# Patient Record
Sex: MALE | Race: WHITE | NOT HISPANIC OR LATINO | Employment: UNEMPLOYED | URBAN - METROPOLITAN AREA
[De-identification: names, ages, dates, MRNs, and addresses within clinical notes are randomized per-mention and may not be internally consistent; named-entity substitution may affect disease eponyms.]

---

## 2017-02-13 ENCOUNTER — HISTORICAL (OUTPATIENT)
Dept: ADMINISTRATIVE | Facility: HOSPITAL | Age: 62
End: 2017-02-13

## 2018-06-04 ENCOUNTER — HISTORICAL (OUTPATIENT)
Dept: INTERNAL MEDICINE | Facility: CLINIC | Age: 63
End: 2018-06-04

## 2018-06-08 ENCOUNTER — HISTORICAL (OUTPATIENT)
Dept: INTERNAL MEDICINE | Facility: CLINIC | Age: 63
End: 2018-06-08

## 2018-12-11 ENCOUNTER — HISTORICAL (OUTPATIENT)
Dept: INTERNAL MEDICINE | Facility: CLINIC | Age: 63
End: 2018-12-11

## 2019-01-14 ENCOUNTER — HISTORICAL (OUTPATIENT)
Dept: INTERNAL MEDICINE | Facility: CLINIC | Age: 64
End: 2019-01-14

## 2019-05-13 ENCOUNTER — HISTORICAL (OUTPATIENT)
Dept: INTERNAL MEDICINE | Facility: CLINIC | Age: 64
End: 2019-05-13

## 2019-10-21 ENCOUNTER — HISTORICAL (OUTPATIENT)
Dept: INTERNAL MEDICINE | Facility: CLINIC | Age: 64
End: 2019-10-21

## 2020-05-04 ENCOUNTER — HISTORICAL (OUTPATIENT)
Dept: LAB | Facility: HOSPITAL | Age: 65
End: 2020-05-04

## 2020-06-12 ENCOUNTER — HISTORICAL (OUTPATIENT)
Dept: INTERNAL MEDICINE | Facility: CLINIC | Age: 65
End: 2020-06-12

## 2020-12-04 ENCOUNTER — HISTORICAL (OUTPATIENT)
Dept: INTERNAL MEDICINE | Facility: CLINIC | Age: 65
End: 2020-12-04

## 2021-06-07 ENCOUNTER — HISTORICAL (OUTPATIENT)
Dept: INTERNAL MEDICINE | Facility: CLINIC | Age: 66
End: 2021-06-07

## 2021-07-16 ENCOUNTER — HISTORICAL (OUTPATIENT)
Dept: ENDOSCOPY | Facility: HOSPITAL | Age: 66
End: 2021-07-16

## 2021-07-16 LAB — CRC RECOMMENDATION EXT: NORMAL

## 2021-12-08 ENCOUNTER — HISTORICAL (OUTPATIENT)
Dept: INTERNAL MEDICINE | Facility: CLINIC | Age: 66
End: 2021-12-08

## 2022-04-09 ENCOUNTER — HISTORICAL (OUTPATIENT)
Dept: ADMINISTRATIVE | Facility: HOSPITAL | Age: 67
End: 2022-04-09
Payer: MEDICARE

## 2022-04-25 VITALS
HEIGHT: 74 IN | WEIGHT: 258.19 LBS | DIASTOLIC BLOOD PRESSURE: 83 MMHG | SYSTOLIC BLOOD PRESSURE: 139 MMHG | BODY MASS INDEX: 33.13 KG/M2

## 2022-05-02 NOTE — HISTORICAL [facility]
This is a historical note converted from Judie. Formatting and pictures may have been removed.  Please reference Judie for original formatting and attached multimedia. History of Present Illness  Patient is a 64 yo male with history of HTN and HLD who presents for colonoscopy due to a positive FIT in 06/2020. He feels well this morning and completed his bowel prep. He denies nausea, vomiting, abdominal pain, constipation, diarrhea, or hematochezia. He had a colonoscopy at 52 yo that was normal per the patient.  Review of Systems  As per HPI  Physical Exam  Vitals & Measurements  T:?36.7? ?C (Oral)? HR:?71(Monitored)? RR:?18? BP:?140/91? SpO2:?100%? WT:?115.8?kg? BMI:?32.81?  NAD  Stable on room air  RRR  Abd soft, NT, ND  Moves all extremities  Assessment/Plan  Patient is a 64 yo male with history of HTN and HLD who presents for colonoscopy due to a positive FIT in 06/2020.  -Colonoscopy today  -Informed consent in chart  ?  Isa Gonzales MD  [employer] General Surgery, PGY-2  ?  ?   Agree w above. FIT (+). Colonoscopy.   Problem List/Past Medical History  Ongoing  HLD (hyperlipidemia)  HTN (hypertension)  Hypovitaminosis D  Obesity  Prediabetes  Historical  Colon cancer screening  HTN - Hypertension  Knee pain  Right carpal tunnel syndrome  Well adult exam  Procedure/Surgical History  Arthroscopy of knee joint (1974)  Hernia repair  Umbilical incision   Medications  Inpatient  buffered lidocaine 2% - 0.5 ml syringe, 10 mg= 0.5 mL, Subcutaneous, As Directed  IVF Lactated Ringers LR Infusion 1,000 mL, 1000 mL, IV  Home  aspirin 81 mg oral tablet, 81 mg= 1 tab(s), Oral, Daily,? ?Not taking  atorvastatin 10 mg oral tablet, 10 mg= 1 tab(s), Oral, Daily, 3 refills  Cialis 5 mg oral tablet, 5 mg= 1 tab(s), Oral, Daily, PRN, 1 refills  diclofenac sodium 75 mg oral delayed release tablet, 75 mg= 1 tab(s), Oral, BID  Flomax 0.4 mg oral capsule, 0.4 mg= 1 cap(s), Oral, Daily, 3 refills  hydrochlorothiazide 25 mg oral tablet, 25  mg= 1 tab(s), Oral, Daily, 3 refills  lisinopril 40 mg oral tablet, 40 mg= 1 tab(s), Oral, Daily  metoprolol succinate 50 mg oral tablet, extended release, 50 mg= 1 tab(s), Oral, Daily, 3 refills  Allergies  No Known Allergies  No Known Medication Allergies  Social History  Abuse/Neglect  No, 07/16/2021  Alcohol  Current, Beer, Liquor, 1-2 times per month, 06/10/2021  Employment/School  Employed, 08/19/2020  Exercise  Exercise frequency: 3-4 times/week. Self assessment: Good condition. Exercise type: Walking., 01/09/2019  Financial/Legal Situation  No insurance, 06/10/2021  Home/Environment  Lives with Spouse. Living situation: Home/Independent. Home equipment: BP Monitor. Alcohol abuse in household: No. Substance abuse in household: No. Smoker in household: Yes., 05/15/2017    Never in , 06/10/2021  Nutrition/Health  Type of diet: low sodium. Regular, Caffeine intake amount: 2 cups coffee/day., 01/09/2019  Other  Sexual  Sexually active: No. Gender Identity Identifies as male., 11/18/2019  Spiritual/Cultural  Mosque, 11/18/2019  Substance Use  Past, Marijuana, 09/04/2019  Tobacco  Never (less than 100 in lifetime), N/A, 07/16/2021  Family History  Congestive heart disease.: Father.  Dementia: Mother.  Hypertension.: Father.  Rheumatoid arthritis: Mother.  Immunizations  Vaccine Date Status Comments   COVID-19 mRNA, LNP-S, PF - Moderna 04/13/2021 Recorded    COVID-19 mRNA, LNP-S, PF - Moderna 03/16/2021 Recorded    tetanus/diphtheria/pertussis, acel(Tdap) 06/15/2019 Given    influenza virus vaccine, inactivated 03/25/2019 Recorded    influenza virus vaccine, inactivated 08/25/2018 Recorded    influenza virus vaccine, inactivated 10/28/2017 Recorded [facility]   tetanus/diphtheria/pertussis, acel(Tdap) 05/15/2017 Given

## 2022-05-31 ENCOUNTER — LAB VISIT (OUTPATIENT)
Dept: LAB | Facility: HOSPITAL | Age: 67
End: 2022-05-31
Attending: NURSE PRACTITIONER
Payer: MEDICARE

## 2022-05-31 DIAGNOSIS — E78.5 HYPERLIPIDEMIA, UNSPECIFIED HYPERLIPIDEMIA TYPE: ICD-10-CM

## 2022-05-31 DIAGNOSIS — Z00.00 WELLNESS EXAMINATION: Primary | ICD-10-CM

## 2022-05-31 DIAGNOSIS — R73.03 PREDIABETES: ICD-10-CM

## 2022-05-31 LAB
ALBUMIN SERPL-MCNC: 4.1 GM/DL (ref 3.4–4.8)
ALBUMIN/GLOB SERPL: 1.2 RATIO (ref 1.1–2)
ALP SERPL-CCNC: 105 UNIT/L (ref 40–150)
ALT SERPL-CCNC: 42 UNIT/L (ref 0–55)
APPEARANCE UR: CLEAR
AST SERPL-CCNC: 28 UNIT/L (ref 5–34)
BACTERIA #/AREA URNS AUTO: ABNORMAL /HPF
BASOPHILS # BLD AUTO: 0.05 X10(3)/MCL (ref 0–0.2)
BASOPHILS NFR BLD AUTO: 0.8 %
BILIRUB UR QL STRIP.AUTO: NEGATIVE MG/DL
BILIRUBIN DIRECT+TOT PNL SERPL-MCNC: 0.6 MG/DL
BUN SERPL-MCNC: 24.6 MG/DL (ref 8.4–25.7)
CALCIUM SERPL-MCNC: 9.6 MG/DL (ref 8.8–10)
CHLORIDE SERPL-SCNC: 105 MMOL/L (ref 98–107)
CHOLEST SERPL-MCNC: 202 MG/DL
CHOLEST/HDLC SERPL: 7 {RATIO} (ref 0–5)
CO2 SERPL-SCNC: 25 MMOL/L (ref 23–31)
COLOR UR AUTO: YELLOW
CREAT SERPL-MCNC: 0.83 MG/DL (ref 0.73–1.18)
EOSINOPHIL # BLD AUTO: 0.21 X10(3)/MCL (ref 0–0.9)
EOSINOPHIL NFR BLD AUTO: 3.2 %
ERYTHROCYTE [DISTWIDTH] IN BLOOD BY AUTOMATED COUNT: 13.2 % (ref 11.5–17)
EST. AVERAGE GLUCOSE BLD GHB EST-MCNC: 116.9 MG/DL
GLOBULIN SER-MCNC: 3.4 GM/DL (ref 2.4–3.5)
GLUCOSE SERPL-MCNC: 107 MG/DL (ref 82–115)
GLUCOSE UR QL STRIP.AUTO: NORMAL MG/DL
HBA1C MFR BLD: 5.7 %
HCT VFR BLD AUTO: 46.1 % (ref 42–52)
HDLC SERPL-MCNC: 30 MG/DL (ref 35–60)
HGB BLD-MCNC: 15.3 GM/DL (ref 14–18)
HYALINE CASTS #/AREA URNS LPF: ABNORMAL /LPF
IMM GRANULOCYTES # BLD AUTO: 0.02 X10(3)/MCL (ref 0–0.02)
IMM GRANULOCYTES NFR BLD AUTO: 0.3 % (ref 0–0.43)
KETONES UR QL STRIP.AUTO: NEGATIVE MG/DL
LDLC SERPL CALC-MCNC: 125 MG/DL (ref 50–140)
LEUKOCYTE ESTERASE UR QL STRIP.AUTO: NEGATIVE UNIT/L
LYMPHOCYTES # BLD AUTO: 2.11 X10(3)/MCL (ref 0.6–4.6)
LYMPHOCYTES NFR BLD AUTO: 32.5 %
MCH RBC QN AUTO: 30.6 PG (ref 27–31)
MCHC RBC AUTO-ENTMCNC: 33.2 MG/DL (ref 33–36)
MCV RBC AUTO: 92.2 FL (ref 80–94)
MONOCYTES # BLD AUTO: 0.62 X10(3)/MCL (ref 0.1–1.3)
MONOCYTES NFR BLD AUTO: 9.5 %
MUCOUS THREADS URNS QL MICRO: ABNORMAL /LPF
NEUTROPHILS # BLD AUTO: 3.5 X10(3)/MCL (ref 2.1–9.2)
NEUTROPHILS NFR BLD AUTO: 53.7 %
NITRITE UR QL STRIP.AUTO: NEGATIVE
NRBC BLD AUTO-RTO: 0 %
PH UR STRIP.AUTO: 6 [PH]
PLATELET # BLD AUTO: 240 X10(3)/MCL (ref 130–400)
PMV BLD AUTO: 11.7 FL (ref 9.4–12.4)
POTASSIUM SERPL-SCNC: 4.2 MMOL/L (ref 3.5–5.1)
PROT SERPL-MCNC: 7.5 GM/DL (ref 5.8–7.6)
PROT UR QL STRIP.AUTO: ABNORMAL MG/DL
PSA SERPL-MCNC: 2.96 NG/ML
RBC # BLD AUTO: 5 X10(6)/MCL (ref 4.7–6.1)
RBC #/AREA URNS AUTO: ABNORMAL /HPF
RBC UR QL AUTO: NEGATIVE UNIT/L
SODIUM SERPL-SCNC: 138 MMOL/L (ref 136–145)
SP GR UR STRIP.AUTO: 1.03
SQUAMOUS #/AREA URNS LPF: ABNORMAL /HPF
TRIGL SERPL-MCNC: 237 MG/DL (ref 34–140)
TSH SERPL-ACNC: 1.82 UIU/ML (ref 0.35–4.94)
UROBILINOGEN UR STRIP-ACNC: NORMAL MG/DL
VLDLC SERPL CALC-MCNC: 47 MG/DL
WBC # SPEC AUTO: 6.5 X10(3)/MCL (ref 4.5–11.5)
WBC #/AREA URNS AUTO: ABNORMAL /HPF

## 2022-05-31 PROCEDURE — 84153 ASSAY OF PSA TOTAL: CPT

## 2022-05-31 PROCEDURE — 36415 COLL VENOUS BLD VENIPUNCTURE: CPT

## 2022-05-31 PROCEDURE — 81001 URINALYSIS AUTO W/SCOPE: CPT

## 2022-05-31 PROCEDURE — 80061 LIPID PANEL: CPT

## 2022-05-31 PROCEDURE — 80053 COMPREHEN METABOLIC PANEL: CPT

## 2022-05-31 PROCEDURE — 85025 COMPLETE CBC W/AUTO DIFF WBC: CPT

## 2022-05-31 PROCEDURE — 84443 ASSAY THYROID STIM HORMONE: CPT

## 2022-05-31 PROCEDURE — 83036 HEMOGLOBIN GLYCOSYLATED A1C: CPT

## 2022-06-09 PROBLEM — E55.9 VITAMIN D DEFICIENCY: Status: ACTIVE | Noted: 2022-06-09

## 2022-06-09 PROBLEM — I10 HYPERTENSION: Status: ACTIVE | Noted: 2022-06-09

## 2022-06-09 PROBLEM — E66.9 OBESITY: Status: ACTIVE | Noted: 2022-06-09

## 2022-06-09 PROBLEM — M19.90 ARTHRITIS: Status: ACTIVE | Noted: 2022-06-09

## 2022-06-09 PROBLEM — E78.5 HYPERLIPIDEMIA: Status: ACTIVE | Noted: 2022-06-09
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
## 2022-06-10 ENCOUNTER — OFFICE VISIT (OUTPATIENT)
Dept: INTERNAL MEDICINE | Facility: CLINIC | Age: 67
End: 2022-06-10
Payer: COMMERCIAL

## 2022-06-10 VITALS
TEMPERATURE: 98 F | WEIGHT: 258 LBS | HEIGHT: 74 IN | DIASTOLIC BLOOD PRESSURE: 82 MMHG | BODY MASS INDEX: 33.11 KG/M2 | RESPIRATION RATE: 20 BRPM | HEART RATE: 75 BPM | SYSTOLIC BLOOD PRESSURE: 134 MMHG

## 2022-06-10 DIAGNOSIS — F51.01 PRIMARY INSOMNIA: ICD-10-CM

## 2022-06-10 DIAGNOSIS — Z00.00 WELLNESS EXAMINATION: Primary | ICD-10-CM

## 2022-06-10 DIAGNOSIS — I10 HYPERTENSION, UNSPECIFIED TYPE: ICD-10-CM

## 2022-06-10 DIAGNOSIS — N52.9 ERECTILE DYSFUNCTION, UNSPECIFIED ERECTILE DYSFUNCTION TYPE: ICD-10-CM

## 2022-06-10 DIAGNOSIS — E78.5 HYPERLIPIDEMIA, UNSPECIFIED HYPERLIPIDEMIA TYPE: ICD-10-CM

## 2022-06-10 DIAGNOSIS — R73.03 PREDIABETES: ICD-10-CM

## 2022-06-10 DIAGNOSIS — E66.9 OBESITY, UNSPECIFIED CLASSIFICATION, UNSPECIFIED OBESITY TYPE, UNSPECIFIED WHETHER SERIOUS COMORBIDITY PRESENT: ICD-10-CM

## 2022-06-10 DIAGNOSIS — M19.90 ARTHRITIS: ICD-10-CM

## 2022-06-10 PROCEDURE — 1159F MED LIST DOCD IN RCRD: CPT | Mod: CPTII,,, | Performed by: NURSE PRACTITIONER

## 2022-06-10 PROCEDURE — 4010F ACE/ARB THERAPY RXD/TAKEN: CPT | Mod: CPTII,,, | Performed by: NURSE PRACTITIONER

## 2022-06-10 PROCEDURE — 99214 OFFICE O/P EST MOD 30 MIN: CPT | Mod: PBBFAC | Performed by: NURSE PRACTITIONER

## 2022-06-10 PROCEDURE — 99397 PER PM REEVAL EST PAT 65+ YR: CPT | Mod: 25,S$PBB,, | Performed by: NURSE PRACTITIONER

## 2022-06-10 PROCEDURE — 1160F RVW MEDS BY RX/DR IN RCRD: CPT | Mod: CPTII,,, | Performed by: NURSE PRACTITIONER

## 2022-06-10 PROCEDURE — 3008F PR BODY MASS INDEX (BMI) DOCUMENTED: ICD-10-PCS | Mod: CPTII,,, | Performed by: NURSE PRACTITIONER

## 2022-06-10 PROCEDURE — 3079F PR MOST RECENT DIASTOLIC BLOOD PRESSURE 80-89 MM HG: ICD-10-PCS | Mod: CPTII,,, | Performed by: NURSE PRACTITIONER

## 2022-06-10 PROCEDURE — 3079F DIAST BP 80-89 MM HG: CPT | Mod: CPTII,,, | Performed by: NURSE PRACTITIONER

## 2022-06-10 PROCEDURE — 4010F PR ACE/ARB THEARPY RXD/TAKEN: ICD-10-PCS | Mod: CPTII,,, | Performed by: NURSE PRACTITIONER

## 2022-06-10 PROCEDURE — 1160F PR REVIEW ALL MEDS BY PRESCRIBER/CLIN PHARMACIST DOCUMENTED: ICD-10-PCS | Mod: CPTII,,, | Performed by: NURSE PRACTITIONER

## 2022-06-10 PROCEDURE — 3008F BODY MASS INDEX DOCD: CPT | Mod: CPTII,,, | Performed by: NURSE PRACTITIONER

## 2022-06-10 PROCEDURE — 3075F SYST BP GE 130 - 139MM HG: CPT | Mod: CPTII,,, | Performed by: NURSE PRACTITIONER

## 2022-06-10 PROCEDURE — 99397 PR PREVENTIVE VISIT,EST,65 & OVER: ICD-10-PCS | Mod: 25,S$PBB,, | Performed by: NURSE PRACTITIONER

## 2022-06-10 PROCEDURE — 3075F PR MOST RECENT SYSTOLIC BLOOD PRESS GE 130-139MM HG: ICD-10-PCS | Mod: CPTII,,, | Performed by: NURSE PRACTITIONER

## 2022-06-10 PROCEDURE — 1126F PR PAIN SEVERITY QUANTIFIED, NO PAIN PRESENT: ICD-10-PCS | Mod: CPTII,,, | Performed by: NURSE PRACTITIONER

## 2022-06-10 PROCEDURE — 1126F AMNT PAIN NOTED NONE PRSNT: CPT | Mod: CPTII,,, | Performed by: NURSE PRACTITIONER

## 2022-06-10 PROCEDURE — 1159F PR MEDICATION LIST DOCUMENTED IN MEDICAL RECORD: ICD-10-PCS | Mod: CPTII,,, | Performed by: NURSE PRACTITIONER

## 2022-06-10 RX ORDER — TADALAFIL 10 MG/1
10 TABLET ORAL DAILY PRN
Qty: 10 TABLET | Refills: 1 | Status: SHIPPED | OUTPATIENT
Start: 2022-06-10 | End: 2022-12-12 | Stop reason: SDUPTHER

## 2022-06-10 RX ORDER — METOPROLOL SUCCINATE 50 MG/1
50 TABLET, EXTENDED RELEASE ORAL DAILY
COMMUNITY
Start: 2022-01-20 | End: 2022-06-10 | Stop reason: SDUPTHER

## 2022-06-10 RX ORDER — HYDROCHLOROTHIAZIDE 25 MG/1
25 TABLET ORAL DAILY
COMMUNITY
Start: 2022-01-20 | End: 2022-06-10 | Stop reason: SDUPTHER

## 2022-06-10 RX ORDER — ATORVASTATIN CALCIUM 10 MG/1
10 TABLET, FILM COATED ORAL NIGHTLY
Qty: 90 TABLET | Refills: 1 | Status: SHIPPED | OUTPATIENT
Start: 2022-06-10 | End: 2022-12-08

## 2022-06-10 RX ORDER — METOPROLOL SUCCINATE 50 MG/1
50 TABLET, EXTENDED RELEASE ORAL DAILY
Qty: 90 TABLET | Refills: 1 | Status: SHIPPED | OUTPATIENT
Start: 2022-06-10 | End: 2022-12-08

## 2022-06-10 RX ORDER — LISINOPRIL 40 MG/1
40 TABLET ORAL DAILY
COMMUNITY
Start: 2022-06-04 | End: 2022-09-06

## 2022-06-10 RX ORDER — HYDROCHLOROTHIAZIDE 25 MG/1
25 TABLET ORAL DAILY
Qty: 90 TABLET | Refills: 1 | Status: SHIPPED | OUTPATIENT
Start: 2022-06-10 | End: 2022-12-08

## 2022-06-10 RX ORDER — ATORVASTATIN CALCIUM 10 MG/1
10 TABLET, FILM COATED ORAL DAILY
COMMUNITY
Start: 2022-01-20 | End: 2022-06-10 | Stop reason: SDUPTHER

## 2022-06-10 RX ORDER — TADALAFIL 5 MG/1
5 TABLET ORAL DAILY PRN
COMMUNITY
Start: 2022-01-13 | End: 2022-06-10

## 2022-06-10 RX ORDER — DICLOFENAC SODIUM 75 MG/1
75 TABLET, DELAYED RELEASE ORAL 2 TIMES DAILY PRN
Status: ON HOLD | COMMUNITY
Start: 2022-03-15 | End: 2023-08-02 | Stop reason: HOSPADM

## 2022-06-10 NOTE — ASSESSMENT & PLAN NOTE
Trig elevated on recent lipid panel but patient was not fasting  Continue current regimen  Repeat FLP at f/u--enc fasting labs. Will make adjustments if necessary pending results  Educated on a low-fat, low-cholesterol diet and aerobic exercise (20-30 mins/day x 5 days a week). Encouraged healthy fruits and veggie intake. Increase water intake. Avoid excess ETOH intake and smoking

## 2022-06-10 NOTE — ASSESSMENT & PLAN NOTE
At goal  Continue current medications  Educated on aerobic exercise (3-5 days/week) and a low-fat, low-sodium diet  Avoid excess ETOH consumption. Smoking cessation if applicable  ED precautions (s/s of CVA, etc)

## 2022-06-10 NOTE — ASSESSMENT & PLAN NOTE
Pt will try OTC Melatonin  Be consistent.  Make sure your bedroom is quiet, dark, relaxing, and at a comfortable temperature.  Remove electronic devices, such as TVs, computers, and smart phones, from the bedroom.  Avoid large meals, caffeine, and alcohol before bedtime.

## 2022-06-10 NOTE — ASSESSMENT & PLAN NOTE
HgA1c: 5.7%  Prediabetes: 5.7%-6.4%  Diabetes: 6.5% or greater  Recommendations:  Educated on a diabetic diet- Low-fat, Low-carb, Low-cholesterol. Instructed to avoid excessive intake of sugary/dark drinks/sodas and white foods (i.e., bread, pasta, potatoes, rice).  Encouraged aerobic exercise: 20-30 min/day x 5 days/week.

## 2022-06-10 NOTE — ASSESSMENT & PLAN NOTE
Heat/Ice therapy PRN  NSAIDs PRN (if not contraindicated)  Acetaminophen Arthritis Strength (if not contraindicated)  Aerobic Exercise  Weight Loss

## 2022-06-10 NOTE — ASSESSMENT & PLAN NOTE
Patient had been taking Cialis 5 mg po PRN. States believes he needs a stronger dose. He's taken without any adverse SE so far. States rare use. Will increase to 10 mg po daily PRN   The treatment for ED starts with taking care of your heart and vascular health:   Improve your eating habits (like eating more plant-based foods, and limiting high-fat or processed foods)    Maintain a healthy weight    Stop smoking, if applicable   Increase exercise    Limit drugs and alcohol    Sleep more (ideally 7-8 hours per night)      Oral Drugs   Oral drugs known as PDE type-5 inhibitors increase penile blood flow. These are drugs that are taken as a pill by mouth.  The only oral agents approved in the [place] by the Food and Drug Administration for ED are:    Viagra® (sildenafil citrate)    Levitra® (vardenafil HCl)    Cialis® (tadalafil)    Stendra® (avanafil)   For best results, men should take these pills about an hour or two before having sex. PDE-5 inhibitors improve blood flow to create a strong erection. To work, they require normal nerve function to the penis. Response rates are generally lower for people with diabetes or cancer  If you are taking nitrates for your heart, you SHOULD NOT take any PDE-5 inhibitors due to potentially dangerous hypotension.     Most often, the side effects of PDE-5 inhibitors are mild and usually last only a short while. The most common side effects include:    Facial flushing    Headache    Indigestion    Muscle aches    Stuffy nose   Most of the side effects linked to PDE-5 inhibitors are related to other tissues in the body because these drugs increase blood flow to your penis, so they can also impact other vascular tissues.

## 2022-09-12 PROBLEM — Z00.00 WELLNESS EXAMINATION: Status: RESOLVED | Noted: 2022-06-10 | Resolved: 2022-09-12

## 2022-12-05 ENCOUNTER — LAB VISIT (OUTPATIENT)
Dept: LAB | Facility: HOSPITAL | Age: 67
End: 2022-12-05
Attending: NURSE PRACTITIONER
Payer: COMMERCIAL

## 2022-12-05 DIAGNOSIS — E78.5 HYPERLIPIDEMIA, UNSPECIFIED HYPERLIPIDEMIA TYPE: ICD-10-CM

## 2022-12-05 DIAGNOSIS — R73.03 PREDIABETES: ICD-10-CM

## 2022-12-05 LAB
ALBUMIN SERPL-MCNC: 3.8 GM/DL (ref 3.4–4.8)
ALBUMIN/GLOB SERPL: 1.2 RATIO (ref 1.1–2)
ALP SERPL-CCNC: 95 UNIT/L (ref 40–150)
ALT SERPL-CCNC: 31 UNIT/L (ref 0–55)
AST SERPL-CCNC: 19 UNIT/L (ref 5–34)
BILIRUBIN DIRECT+TOT PNL SERPL-MCNC: 0.4 MG/DL
BUN SERPL-MCNC: 15.4 MG/DL (ref 8.4–25.7)
CALCIUM SERPL-MCNC: 9.7 MG/DL (ref 8.8–10)
CHLORIDE SERPL-SCNC: 103 MMOL/L (ref 98–107)
CHOLEST SERPL-MCNC: 154 MG/DL
CHOLEST/HDLC SERPL: 5 {RATIO} (ref 0–5)
CO2 SERPL-SCNC: 23 MMOL/L (ref 23–31)
CREAT SERPL-MCNC: 0.85 MG/DL (ref 0.73–1.18)
EST. AVERAGE GLUCOSE BLD GHB EST-MCNC: 116.9 MG/DL
GFR SERPLBLD CREATININE-BSD FMLA CKD-EPI: >60 MLS/MIN/1.73/M2
GLOBULIN SER-MCNC: 3.3 GM/DL (ref 2.4–3.5)
GLUCOSE SERPL-MCNC: 104 MG/DL (ref 82–115)
HBA1C MFR BLD: 5.7 %
HDLC SERPL-MCNC: 32 MG/DL (ref 35–60)
LDLC SERPL CALC-MCNC: 87 MG/DL (ref 50–140)
POTASSIUM SERPL-SCNC: 4.1 MMOL/L (ref 3.5–5.1)
PROT SERPL-MCNC: 7.1 GM/DL (ref 5.8–7.6)
SODIUM SERPL-SCNC: 137 MMOL/L (ref 136–145)
TRIGL SERPL-MCNC: 174 MG/DL (ref 34–140)
VLDLC SERPL CALC-MCNC: 35 MG/DL

## 2022-12-05 PROCEDURE — 80053 COMPREHEN METABOLIC PANEL: CPT

## 2022-12-05 PROCEDURE — 80061 LIPID PANEL: CPT

## 2022-12-05 PROCEDURE — 83036 HEMOGLOBIN GLYCOSYLATED A1C: CPT

## 2022-12-05 PROCEDURE — 36415 COLL VENOUS BLD VENIPUNCTURE: CPT

## 2022-12-10 ENCOUNTER — PATIENT OUTREACH (OUTPATIENT)
Dept: ADMINISTRATIVE | Facility: HOSPITAL | Age: 67
End: 2022-12-10
Payer: COMMERCIAL

## 2022-12-12 ENCOUNTER — OFFICE VISIT (OUTPATIENT)
Dept: INTERNAL MEDICINE | Facility: CLINIC | Age: 67
End: 2022-12-12
Payer: COMMERCIAL

## 2022-12-12 VITALS
TEMPERATURE: 98 F | RESPIRATION RATE: 20 BRPM | BODY MASS INDEX: 32.78 KG/M2 | HEART RATE: 71 BPM | HEIGHT: 74 IN | DIASTOLIC BLOOD PRESSURE: 68 MMHG | WEIGHT: 255.38 LBS | SYSTOLIC BLOOD PRESSURE: 122 MMHG

## 2022-12-12 DIAGNOSIS — E78.5 HYPERLIPIDEMIA, UNSPECIFIED HYPERLIPIDEMIA TYPE: ICD-10-CM

## 2022-12-12 DIAGNOSIS — Z23 NEED FOR PNEUMOCOCCAL VACCINATION: Primary | ICD-10-CM

## 2022-12-12 DIAGNOSIS — Z00.00 WELLNESS EXAMINATION: ICD-10-CM

## 2022-12-12 DIAGNOSIS — R73.03 PREDIABETES: ICD-10-CM

## 2022-12-12 DIAGNOSIS — N52.9 ERECTILE DYSFUNCTION, UNSPECIFIED ERECTILE DYSFUNCTION TYPE: ICD-10-CM

## 2022-12-12 DIAGNOSIS — Z12.5 SCREENING FOR PROSTATE CANCER: ICD-10-CM

## 2022-12-12 DIAGNOSIS — I10 HYPERTENSION, UNSPECIFIED TYPE: ICD-10-CM

## 2022-12-12 PROCEDURE — 99214 PR OFFICE/OUTPT VISIT, EST, LEVL IV, 30-39 MIN: ICD-10-PCS | Mod: S$PBB,,, | Performed by: NURSE PRACTITIONER

## 2022-12-12 PROCEDURE — 1101F PT FALLS ASSESS-DOCD LE1/YR: CPT | Mod: CPTII,,, | Performed by: NURSE PRACTITIONER

## 2022-12-12 PROCEDURE — 4010F ACE/ARB THERAPY RXD/TAKEN: CPT | Mod: CPTII,,, | Performed by: NURSE PRACTITIONER

## 2022-12-12 PROCEDURE — 1126F PR PAIN SEVERITY QUANTIFIED, NO PAIN PRESENT: ICD-10-PCS | Mod: CPTII,,, | Performed by: NURSE PRACTITIONER

## 2022-12-12 PROCEDURE — 3078F DIAST BP <80 MM HG: CPT | Mod: CPTII,,, | Performed by: NURSE PRACTITIONER

## 2022-12-12 PROCEDURE — 3074F PR MOST RECENT SYSTOLIC BLOOD PRESSURE < 130 MM HG: ICD-10-PCS | Mod: CPTII,,, | Performed by: NURSE PRACTITIONER

## 2022-12-12 PROCEDURE — 3078F PR MOST RECENT DIASTOLIC BLOOD PRESSURE < 80 MM HG: ICD-10-PCS | Mod: CPTII,,, | Performed by: NURSE PRACTITIONER

## 2022-12-12 PROCEDURE — 90677 PCV20 VACCINE IM: CPT | Mod: PBBFAC

## 2022-12-12 PROCEDURE — 1159F MED LIST DOCD IN RCRD: CPT | Mod: CPTII,,, | Performed by: NURSE PRACTITIONER

## 2022-12-12 PROCEDURE — 1160F PR REVIEW ALL MEDS BY PRESCRIBER/CLIN PHARMACIST DOCUMENTED: ICD-10-PCS | Mod: CPTII,,, | Performed by: NURSE PRACTITIONER

## 2022-12-12 PROCEDURE — 1160F RVW MEDS BY RX/DR IN RCRD: CPT | Mod: CPTII,,, | Performed by: NURSE PRACTITIONER

## 2022-12-12 PROCEDURE — 3008F PR BODY MASS INDEX (BMI) DOCUMENTED: ICD-10-PCS | Mod: CPTII,,, | Performed by: NURSE PRACTITIONER

## 2022-12-12 PROCEDURE — 3288F PR FALLS RISK ASSESSMENT DOCUMENTED: ICD-10-PCS | Mod: CPTII,,, | Performed by: NURSE PRACTITIONER

## 2022-12-12 PROCEDURE — 4010F PR ACE/ARB THEARPY RXD/TAKEN: ICD-10-PCS | Mod: CPTII,,, | Performed by: NURSE PRACTITIONER

## 2022-12-12 PROCEDURE — 1126F AMNT PAIN NOTED NONE PRSNT: CPT | Mod: CPTII,,, | Performed by: NURSE PRACTITIONER

## 2022-12-12 PROCEDURE — 3288F FALL RISK ASSESSMENT DOCD: CPT | Mod: CPTII,,, | Performed by: NURSE PRACTITIONER

## 2022-12-12 PROCEDURE — 99214 OFFICE O/P EST MOD 30 MIN: CPT | Mod: PBBFAC,25 | Performed by: NURSE PRACTITIONER

## 2022-12-12 PROCEDURE — 3008F BODY MASS INDEX DOCD: CPT | Mod: CPTII,,, | Performed by: NURSE PRACTITIONER

## 2022-12-12 PROCEDURE — 3074F SYST BP LT 130 MM HG: CPT | Mod: CPTII,,, | Performed by: NURSE PRACTITIONER

## 2022-12-12 PROCEDURE — 1101F PR PT FALLS ASSESS DOC 0-1 FALLS W/OUT INJ PAST YR: ICD-10-PCS | Mod: CPTII,,, | Performed by: NURSE PRACTITIONER

## 2022-12-12 PROCEDURE — 99214 OFFICE O/P EST MOD 30 MIN: CPT | Mod: S$PBB,,, | Performed by: NURSE PRACTITIONER

## 2022-12-12 PROCEDURE — 1159F PR MEDICATION LIST DOCUMENTED IN MEDICAL RECORD: ICD-10-PCS | Mod: CPTII,,, | Performed by: NURSE PRACTITIONER

## 2022-12-12 RX ORDER — HYDROCHLOROTHIAZIDE 25 MG/1
25 TABLET ORAL DAILY
Qty: 90 TABLET | Refills: 1 | Status: SHIPPED | OUTPATIENT
Start: 2022-12-12 | End: 2023-06-12 | Stop reason: SDUPTHER

## 2022-12-12 RX ORDER — ATORVASTATIN CALCIUM 10 MG/1
10 TABLET, FILM COATED ORAL NIGHTLY
Qty: 90 TABLET | Refills: 1 | Status: SHIPPED | OUTPATIENT
Start: 2022-12-12 | End: 2023-06-12 | Stop reason: SDUPTHER

## 2022-12-12 RX ORDER — LISINOPRIL 40 MG/1
40 TABLET ORAL DAILY
Qty: 90 TABLET | Refills: 1 | Status: SHIPPED | OUTPATIENT
Start: 2022-12-12 | End: 2023-06-12 | Stop reason: SDUPTHER

## 2022-12-12 RX ORDER — METOPROLOL SUCCINATE 50 MG/1
50 TABLET, EXTENDED RELEASE ORAL DAILY
Qty: 90 TABLET | Refills: 1 | Status: SHIPPED | OUTPATIENT
Start: 2022-12-12 | End: 2023-06-12 | Stop reason: SDUPTHER

## 2022-12-12 RX ORDER — TADALAFIL 10 MG/1
10 TABLET ORAL DAILY PRN
Qty: 10 TABLET | Refills: 1 | Status: SHIPPED | OUTPATIENT
Start: 2022-12-12 | End: 2023-11-13

## 2022-12-12 NOTE — ASSESSMENT & PLAN NOTE
FLP improved  Continue statin  Educated on a low-fat, low-cholesterol diet and aerobic exercise (20-30 mins/day x 5 days a week). Encouraged healthy fruits and veggie intake. Increase water intake. Avoid excess ETOH intake and smoking
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
## 2023-03-13 PROBLEM — Z00.00 WELLNESS EXAMINATION: Status: RESOLVED | Noted: 2022-06-10 | Resolved: 2023-03-13

## 2023-06-06 ENCOUNTER — LAB VISIT (OUTPATIENT)
Dept: LAB | Facility: HOSPITAL | Age: 68
End: 2023-06-06
Attending: NURSE PRACTITIONER
Payer: COMMERCIAL

## 2023-06-06 DIAGNOSIS — E78.5 HYPERLIPIDEMIA, UNSPECIFIED HYPERLIPIDEMIA TYPE: ICD-10-CM

## 2023-06-06 DIAGNOSIS — I10 HYPERTENSION, UNSPECIFIED TYPE: ICD-10-CM

## 2023-06-06 DIAGNOSIS — R73.03 PREDIABETES: ICD-10-CM

## 2023-06-06 DIAGNOSIS — Z12.5 SCREENING FOR PROSTATE CANCER: ICD-10-CM

## 2023-06-06 LAB
ALBUMIN SERPL-MCNC: 3.8 G/DL (ref 3.4–4.8)
ALBUMIN/GLOB SERPL: 1.3 RATIO (ref 1.1–2)
ALP SERPL-CCNC: 72 UNIT/L (ref 40–150)
ALT SERPL-CCNC: 33 UNIT/L (ref 0–55)
APPEARANCE UR: CLEAR
AST SERPL-CCNC: 24 UNIT/L (ref 5–34)
BACTERIA #/AREA URNS AUTO: ABNORMAL /HPF
BASOPHILS # BLD AUTO: 0.07 X10(3)/MCL
BASOPHILS NFR BLD AUTO: 0.9 %
BILIRUB UR QL STRIP.AUTO: NEGATIVE MG/DL
BILIRUBIN DIRECT+TOT PNL SERPL-MCNC: 0.5 MG/DL
BUN SERPL-MCNC: 19.8 MG/DL (ref 8.4–25.7)
CALCIUM SERPL-MCNC: 9.9 MG/DL (ref 8.8–10)
CHLORIDE SERPL-SCNC: 104 MMOL/L (ref 98–107)
CHOLEST SERPL-MCNC: 166 MG/DL
CHOLEST/HDLC SERPL: 5 {RATIO} (ref 0–5)
CO2 SERPL-SCNC: 28 MMOL/L (ref 23–31)
COLOR UR: ABNORMAL
CREAT SERPL-MCNC: 0.83 MG/DL (ref 0.73–1.18)
EOSINOPHIL # BLD AUTO: 0.23 X10(3)/MCL (ref 0–0.9)
EOSINOPHIL NFR BLD AUTO: 2.9 %
ERYTHROCYTE [DISTWIDTH] IN BLOOD BY AUTOMATED COUNT: 13.5 % (ref 11.5–17)
EST. AVERAGE GLUCOSE BLD GHB EST-MCNC: 119.8 MG/DL
GFR SERPLBLD CREATININE-BSD FMLA CKD-EPI: >60 MLS/MIN/1.73/M2
GLOBULIN SER-MCNC: 2.9 GM/DL (ref 2.4–3.5)
GLUCOSE SERPL-MCNC: 104 MG/DL (ref 82–115)
GLUCOSE UR QL STRIP.AUTO: NORMAL MG/DL
HBA1C MFR BLD: 5.8 %
HCT VFR BLD AUTO: 42.6 % (ref 42–52)
HDLC SERPL-MCNC: 31 MG/DL (ref 35–60)
HGB BLD-MCNC: 14.7 G/DL (ref 14–18)
HYALINE CASTS #/AREA URNS LPF: ABNORMAL /LPF
IMM GRANULOCYTES # BLD AUTO: 0.03 X10(3)/MCL (ref 0–0.04)
IMM GRANULOCYTES NFR BLD AUTO: 0.4 %
KETONES UR QL STRIP.AUTO: NEGATIVE MG/DL
LDLC SERPL CALC-MCNC: 108 MG/DL (ref 50–140)
LEUKOCYTE ESTERASE UR QL STRIP.AUTO: NEGATIVE UNIT/L
LYMPHOCYTES # BLD AUTO: 1.97 X10(3)/MCL (ref 0.6–4.6)
LYMPHOCYTES NFR BLD AUTO: 24.4 %
MCH RBC QN AUTO: 31.5 PG (ref 27–31)
MCHC RBC AUTO-ENTMCNC: 34.5 G/DL (ref 33–36)
MCV RBC AUTO: 91.4 FL (ref 80–94)
MONOCYTES # BLD AUTO: 0.65 X10(3)/MCL (ref 0.1–1.3)
MONOCYTES NFR BLD AUTO: 8.1 %
MUCOUS THREADS URNS QL MICRO: ABNORMAL /LPF
NEUTROPHILS # BLD AUTO: 5.11 X10(3)/MCL (ref 2.1–9.2)
NEUTROPHILS NFR BLD AUTO: 63.3 %
NITRITE UR QL STRIP.AUTO: NEGATIVE
NRBC BLD AUTO-RTO: 0 %
PH UR STRIP.AUTO: 6 [PH]
PLATELET # BLD AUTO: 198 X10(3)/MCL (ref 130–400)
PMV BLD AUTO: 11.9 FL (ref 7.4–10.4)
POTASSIUM SERPL-SCNC: 4.5 MMOL/L (ref 3.5–5.1)
PROT SERPL-MCNC: 6.7 GM/DL (ref 5.8–7.6)
PROT UR QL STRIP.AUTO: NEGATIVE MG/DL
PSA SERPL-MCNC: 2.77 NG/ML
RBC # BLD AUTO: 4.66 X10(6)/MCL (ref 4.7–6.1)
RBC #/AREA URNS AUTO: ABNORMAL /HPF
RBC UR QL AUTO: NEGATIVE UNIT/L
SODIUM SERPL-SCNC: 138 MMOL/L (ref 136–145)
SP GR UR STRIP.AUTO: 1.02
SQUAMOUS #/AREA URNS LPF: ABNORMAL /HPF
TRIGL SERPL-MCNC: 135 MG/DL (ref 34–140)
TSH SERPL-ACNC: 1.92 UIU/ML (ref 0.35–4.94)
UROBILINOGEN UR STRIP-ACNC: NORMAL MG/DL
VLDLC SERPL CALC-MCNC: 27 MG/DL
WBC # SPEC AUTO: 8.06 X10(3)/MCL (ref 4.5–11.5)
WBC #/AREA URNS AUTO: ABNORMAL /HPF

## 2023-06-06 PROCEDURE — 36415 COLL VENOUS BLD VENIPUNCTURE: CPT

## 2023-06-06 PROCEDURE — 83036 HEMOGLOBIN GLYCOSYLATED A1C: CPT

## 2023-06-06 PROCEDURE — 85025 COMPLETE CBC W/AUTO DIFF WBC: CPT

## 2023-06-06 PROCEDURE — 84443 ASSAY THYROID STIM HORMONE: CPT

## 2023-06-06 PROCEDURE — 81001 URINALYSIS AUTO W/SCOPE: CPT

## 2023-06-06 PROCEDURE — 84153 ASSAY OF PSA TOTAL: CPT

## 2023-06-06 PROCEDURE — 80061 LIPID PANEL: CPT

## 2023-06-06 PROCEDURE — 80053 COMPREHEN METABOLIC PANEL: CPT

## 2023-06-12 ENCOUNTER — OFFICE VISIT (OUTPATIENT)
Dept: INTERNAL MEDICINE | Facility: CLINIC | Age: 68
End: 2023-06-12
Payer: COMMERCIAL

## 2023-06-12 VITALS
DIASTOLIC BLOOD PRESSURE: 78 MMHG | WEIGHT: 269.19 LBS | RESPIRATION RATE: 20 BRPM | SYSTOLIC BLOOD PRESSURE: 142 MMHG | BODY MASS INDEX: 34.55 KG/M2 | HEART RATE: 73 BPM | HEIGHT: 74 IN | TEMPERATURE: 98 F

## 2023-06-12 DIAGNOSIS — F43.21 GRIEF: ICD-10-CM

## 2023-06-12 DIAGNOSIS — E78.5 HYPERLIPIDEMIA, UNSPECIFIED HYPERLIPIDEMIA TYPE: ICD-10-CM

## 2023-06-12 DIAGNOSIS — M25.532 PAIN IN BOTH WRISTS: ICD-10-CM

## 2023-06-12 DIAGNOSIS — R73.03 PREDIABETES: ICD-10-CM

## 2023-06-12 DIAGNOSIS — M54.9 DORSALGIA, UNSPECIFIED: Primary | ICD-10-CM

## 2023-06-12 DIAGNOSIS — M54.9 DORSALGIA: ICD-10-CM

## 2023-06-12 DIAGNOSIS — Z00.00 WELLNESS EXAMINATION: ICD-10-CM

## 2023-06-12 DIAGNOSIS — F51.01 PRIMARY INSOMNIA: ICD-10-CM

## 2023-06-12 DIAGNOSIS — M25.531 PAIN IN BOTH WRISTS: ICD-10-CM

## 2023-06-12 DIAGNOSIS — I10 HYPERTENSION, UNSPECIFIED TYPE: ICD-10-CM

## 2023-06-12 PROCEDURE — 3078F DIAST BP <80 MM HG: CPT | Mod: CPTII,,, | Performed by: NURSE PRACTITIONER

## 2023-06-12 PROCEDURE — 1160F RVW MEDS BY RX/DR IN RCRD: CPT | Mod: CPTII,,, | Performed by: NURSE PRACTITIONER

## 2023-06-12 PROCEDURE — 3288F PR FALLS RISK ASSESSMENT DOCUMENTED: ICD-10-PCS | Mod: CPTII,,, | Performed by: NURSE PRACTITIONER

## 2023-06-12 PROCEDURE — 99214 PR OFFICE/OUTPT VISIT, EST, LEVL IV, 30-39 MIN: ICD-10-PCS | Mod: S$PBB,,, | Performed by: NURSE PRACTITIONER

## 2023-06-12 PROCEDURE — 3288F FALL RISK ASSESSMENT DOCD: CPT | Mod: CPTII,,, | Performed by: NURSE PRACTITIONER

## 2023-06-12 PROCEDURE — 1100F PR PT FALLS ASSESS DOC 2+ FALLS/FALL W/INJURY/YR: ICD-10-PCS | Mod: CPTII,,, | Performed by: NURSE PRACTITIONER

## 2023-06-12 PROCEDURE — 3077F SYST BP >= 140 MM HG: CPT | Mod: CPTII,,, | Performed by: NURSE PRACTITIONER

## 2023-06-12 PROCEDURE — 1100F PTFALLS ASSESS-DOCD GE2>/YR: CPT | Mod: CPTII,,, | Performed by: NURSE PRACTITIONER

## 2023-06-12 PROCEDURE — 1125F PR PAIN SEVERITY QUANTIFIED, PAIN PRESENT: ICD-10-PCS | Mod: CPTII,,, | Performed by: NURSE PRACTITIONER

## 2023-06-12 PROCEDURE — 3077F PR MOST RECENT SYSTOLIC BLOOD PRESSURE >= 140 MM HG: ICD-10-PCS | Mod: CPTII,,, | Performed by: NURSE PRACTITIONER

## 2023-06-12 PROCEDURE — 1159F MED LIST DOCD IN RCRD: CPT | Mod: CPTII,,, | Performed by: NURSE PRACTITIONER

## 2023-06-12 PROCEDURE — 3078F PR MOST RECENT DIASTOLIC BLOOD PRESSURE < 80 MM HG: ICD-10-PCS | Mod: CPTII,,, | Performed by: NURSE PRACTITIONER

## 2023-06-12 PROCEDURE — 1159F PR MEDICATION LIST DOCUMENTED IN MEDICAL RECORD: ICD-10-PCS | Mod: CPTII,,, | Performed by: NURSE PRACTITIONER

## 2023-06-12 PROCEDURE — 99215 OFFICE O/P EST HI 40 MIN: CPT | Mod: PBBFAC | Performed by: NURSE PRACTITIONER

## 2023-06-12 PROCEDURE — 1160F PR REVIEW ALL MEDS BY PRESCRIBER/CLIN PHARMACIST DOCUMENTED: ICD-10-PCS | Mod: CPTII,,, | Performed by: NURSE PRACTITIONER

## 2023-06-12 PROCEDURE — 99214 OFFICE O/P EST MOD 30 MIN: CPT | Mod: S$PBB,,, | Performed by: NURSE PRACTITIONER

## 2023-06-12 PROCEDURE — 3008F BODY MASS INDEX DOCD: CPT | Mod: CPTII,,, | Performed by: NURSE PRACTITIONER

## 2023-06-12 PROCEDURE — 1125F AMNT PAIN NOTED PAIN PRSNT: CPT | Mod: CPTII,,, | Performed by: NURSE PRACTITIONER

## 2023-06-12 PROCEDURE — 4010F ACE/ARB THERAPY RXD/TAKEN: CPT | Mod: CPTII,,, | Performed by: NURSE PRACTITIONER

## 2023-06-12 PROCEDURE — 3008F PR BODY MASS INDEX (BMI) DOCUMENTED: ICD-10-PCS | Mod: CPTII,,, | Performed by: NURSE PRACTITIONER

## 2023-06-12 PROCEDURE — 4010F PR ACE/ARB THEARPY RXD/TAKEN: ICD-10-PCS | Mod: CPTII,,, | Performed by: NURSE PRACTITIONER

## 2023-06-12 RX ORDER — LISINOPRIL 40 MG/1
40 TABLET ORAL DAILY
Qty: 90 TABLET | Refills: 1 | Status: SHIPPED | OUTPATIENT
Start: 2023-06-12 | End: 2023-10-03

## 2023-06-12 RX ORDER — HYDROCHLOROTHIAZIDE 25 MG/1
25 TABLET ORAL DAILY
Qty: 90 TABLET | Refills: 1 | Status: SHIPPED | OUTPATIENT
Start: 2023-06-12 | End: 2023-10-03 | Stop reason: HOSPADM

## 2023-06-12 RX ORDER — METOPROLOL SUCCINATE 50 MG/1
50 TABLET, EXTENDED RELEASE ORAL DAILY
Qty: 90 TABLET | Refills: 1 | Status: SHIPPED | OUTPATIENT
Start: 2023-06-12 | End: 2023-11-03 | Stop reason: SDUPTHER

## 2023-06-12 RX ORDER — ATORVASTATIN CALCIUM 10 MG/1
10 TABLET, FILM COATED ORAL NIGHTLY
Qty: 90 TABLET | Refills: 1 | Status: SHIPPED | OUTPATIENT
Start: 2023-06-12 | End: 2023-11-03 | Stop reason: SDUPTHER

## 2023-06-12 RX ORDER — TRAZODONE HYDROCHLORIDE 50 MG/1
25-50 TABLET ORAL NIGHTLY
Qty: 30 TABLET | Refills: 1 | Status: SHIPPED | OUTPATIENT
Start: 2023-06-12 | End: 2023-07-14 | Stop reason: SDUPTHER

## 2023-06-12 NOTE — ASSESSMENT & PLAN NOTE
MRI L spine due to progressive nature and falls  Will plan referral to Neuro and/or Pain Mgmt pending results  ED precautions  Continue walker use for fall prevention

## 2023-06-12 NOTE — ASSESSMENT & PLAN NOTE
Continue Atorvastatin  Educated on a low-fat, low-cholesterol diet and aerobic exercise (20-30 mins/day x 5 days a week). Encouraged healthy fruits and veggie intake. Increase water intake. Avoid excess ETOH intake and smoking
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
## 2023-06-12 NOTE — ASSESSMENT & PLAN NOTE
Ramirez offered  Patient states is he ready to seek counseling, however, he is interested in seeing someone around [place]. He plans to ask around and states he will call the office with a name for a referral to be sent  He has no SI/HI

## 2023-06-12 NOTE — ASSESSMENT & PLAN NOTE
Trial Trazodone 25-50 mg po nightly PRN. No SI/HI. ED/911 precautions for such  Be consistent.  Make sure your bedroom is quiet, dark, relaxing, and at a comfortable temperature.  Remove electronic devices, such as TVs, computers, and smart phones, from the bedroom.  Avoid large meals, caffeine, and alcohol before bedtime.      F/u 4 weeks

## 2023-06-12 NOTE — ASSESSMENT & PLAN NOTE
Medications refilled  SBP slightly above goal; consider 2/2 back pain  He is asymptomatic  Educated on aerobic exercise (3-5 days/week) and a low-fat, low-sodium diet  Avoid excess ETOH consumption. Smoking cessation if applicable  ED precautions (s/s of CVA, etc)  RTC in 4 weeks to re-check BP

## 2023-06-12 NOTE — ASSESSMENT & PLAN NOTE
Consider CTS  Avoid repetitive motions/trauma/extreme wrist flexion or extension  Wrist splint (maintains wrist in a neutral position)  Refer to Ortho

## 2023-06-12 NOTE — ASSESSMENT & PLAN NOTE
HgA1c: 5.8%  Prediabetes: 5.7%-6.4%  Diabetes: 6.5% or greater  Recommendations:  Educated on a diabetic diet- Low-fat, Low-carb, Low-cholesterol. Instructed to avoid excessive intake of sugary/dark drinks/sodas and white foods (i.e., bread, pasta, potatoes, rice).  Encouraged aerobic exercise: 20-30 min/day x 5 days/week.

## 2023-06-22 ENCOUNTER — HOSPITAL ENCOUNTER (OUTPATIENT)
Dept: RADIOLOGY | Facility: HOSPITAL | Age: 68
Discharge: HOME | End: 2023-06-22
Attending: NURSE PRACTITIONER
Payer: COMMERCIAL

## 2023-06-22 DIAGNOSIS — M54.9 DORSALGIA, UNSPECIFIED: ICD-10-CM

## 2023-06-22 PROCEDURE — 72148 MRI LUMBAR SPINE W/O DYE: CPT | Mod: TC

## 2023-06-23 ENCOUNTER — TELEPHONE (OUTPATIENT)
Dept: INTERNAL MEDICINE | Facility: CLINIC | Age: 68
End: 2023-06-23
Payer: COMMERCIAL

## 2023-06-23 NOTE — TELEPHONE ENCOUNTER
"Patient completed an MRI of his lower spine due to pain and decreased mobility today. MRI with features concerning for "cauda equina impingement." This is an emergent finding and I suggest he visit [facility] or a hospital with neurosx services ASAP for an eval of this. This can compromise his mobility.  "

## 2023-06-24 ENCOUNTER — HOSPITAL ENCOUNTER (EMERGENCY)
Facility: HOSPITAL | Age: 68
Discharge: HOME | End: 2023-06-24
Attending: EMERGENCY MEDICINE
Payer: COMMERCIAL

## 2023-06-24 VITALS
SYSTOLIC BLOOD PRESSURE: 142 MMHG | HEART RATE: 86 BPM | DIASTOLIC BLOOD PRESSURE: 73 MMHG | TEMPERATURE: 99 F | RESPIRATION RATE: 18 BRPM | OXYGEN SATURATION: 96 %

## 2023-06-24 DIAGNOSIS — M54.41 ACUTE MIDLINE LOW BACK PAIN WITH BILATERAL SCIATICA: ICD-10-CM

## 2023-06-24 DIAGNOSIS — M48.061 SPINAL STENOSIS OF LUMBAR REGION WITHOUT NEUROGENIC CLAUDICATION: Primary | ICD-10-CM

## 2023-06-24 DIAGNOSIS — M54.42 ACUTE MIDLINE LOW BACK PAIN WITH BILATERAL SCIATICA: ICD-10-CM

## 2023-06-24 LAB
ALBUMIN SERPL-MCNC: 3.9 G/DL (ref 3.4–4.8)
ALBUMIN/GLOB SERPL: 1.3 RATIO (ref 1.1–2)
ALP SERPL-CCNC: 90 UNIT/L (ref 40–150)
ALT SERPL-CCNC: 37 UNIT/L (ref 0–55)
AST SERPL-CCNC: 25 UNIT/L (ref 5–34)
BASOPHILS # BLD AUTO: 0.06 X10(3)/MCL
BASOPHILS NFR BLD AUTO: 0.8 %
BILIRUBIN DIRECT+TOT PNL SERPL-MCNC: 0.5 MG/DL
BUN SERPL-MCNC: 13.9 MG/DL (ref 8.4–25.7)
CALCIUM SERPL-MCNC: 9.4 MG/DL (ref 8.8–10)
CHLORIDE SERPL-SCNC: 103 MMOL/L (ref 98–107)
CO2 SERPL-SCNC: 22 MMOL/L (ref 23–31)
CREAT SERPL-MCNC: 0.88 MG/DL (ref 0.73–1.18)
EOSINOPHIL # BLD AUTO: 0.09 X10(3)/MCL (ref 0–0.9)
EOSINOPHIL NFR BLD AUTO: 1.2 %
ERYTHROCYTE [DISTWIDTH] IN BLOOD BY AUTOMATED COUNT: 13.1 % (ref 11.5–17)
GFR SERPLBLD CREATININE-BSD FMLA CKD-EPI: >60 MLS/MIN/1.73/M2
GLOBULIN SER-MCNC: 3.1 GM/DL (ref 2.4–3.5)
GLUCOSE SERPL-MCNC: 116 MG/DL (ref 82–115)
HCT VFR BLD AUTO: 45.6 % (ref 42–52)
HGB BLD-MCNC: 15.6 G/DL (ref 14–18)
IMM GRANULOCYTES # BLD AUTO: 0.03 X10(3)/MCL (ref 0–0.04)
IMM GRANULOCYTES NFR BLD AUTO: 0.4 %
LYMPHOCYTES # BLD AUTO: 1.36 X10(3)/MCL (ref 0.6–4.6)
LYMPHOCYTES NFR BLD AUTO: 18.4 %
MCH RBC QN AUTO: 31.3 PG (ref 27–31)
MCHC RBC AUTO-ENTMCNC: 34.2 G/DL (ref 33–36)
MCV RBC AUTO: 91.6 FL (ref 80–94)
MONOCYTES # BLD AUTO: 0.55 X10(3)/MCL (ref 0.1–1.3)
MONOCYTES NFR BLD AUTO: 7.4 %
NEUTROPHILS # BLD AUTO: 5.31 X10(3)/MCL (ref 2.1–9.2)
NEUTROPHILS NFR BLD AUTO: 71.8 %
NRBC BLD AUTO-RTO: 0 %
PLATELET # BLD AUTO: 221 X10(3)/MCL (ref 130–400)
PMV BLD AUTO: 11.7 FL (ref 7.4–10.4)
POTASSIUM SERPL-SCNC: 4.2 MMOL/L (ref 3.5–5.1)
PROT SERPL-MCNC: 7 GM/DL (ref 5.8–7.6)
RBC # BLD AUTO: 4.98 X10(6)/MCL (ref 4.7–6.1)
SODIUM SERPL-SCNC: 134 MMOL/L (ref 136–145)
WBC # SPEC AUTO: 7.4 X10(3)/MCL (ref 4.5–11.5)

## 2023-06-24 PROCEDURE — 80053 COMPREHEN METABOLIC PANEL: CPT | Performed by: NURSE PRACTITIONER

## 2023-06-24 PROCEDURE — 99284 EMERGENCY DEPT VISIT MOD MDM: CPT | Mod: 25

## 2023-06-24 PROCEDURE — 63600175 PHARM REV CODE 636 W HCPCS: Performed by: EMERGENCY MEDICINE

## 2023-06-24 PROCEDURE — 85025 COMPLETE CBC W/AUTO DIFF WBC: CPT | Performed by: NURSE PRACTITIONER

## 2023-06-24 PROCEDURE — 96374 THER/PROPH/DIAG INJ IV PUSH: CPT

## 2023-06-24 PROCEDURE — 96375 TX/PRO/DX INJ NEW DRUG ADDON: CPT

## 2023-06-24 RX ORDER — HYDROCODONE BITARTRATE AND ACETAMINOPHEN 10; 325 MG/1; MG/1
1 TABLET ORAL EVERY 6 HOURS PRN
Qty: 15 TABLET | Refills: 0 | Status: SHIPPED | OUTPATIENT
Start: 2023-06-24 | End: 2023-07-12

## 2023-06-24 RX ORDER — GABAPENTIN 300 MG/1
300 CAPSULE ORAL 3 TIMES DAILY
Qty: 90 CAPSULE | Refills: 0 | Status: SHIPPED | OUTPATIENT
Start: 2023-06-24 | End: 2023-07-12 | Stop reason: SDUPTHER

## 2023-06-24 RX ORDER — ONDANSETRON 2 MG/ML
4 INJECTION INTRAMUSCULAR; INTRAVENOUS
Status: COMPLETED | OUTPATIENT
Start: 2023-06-24 | End: 2023-06-24

## 2023-06-24 RX ORDER — MORPHINE SULFATE 4 MG/ML
4 INJECTION, SOLUTION INTRAMUSCULAR; INTRAVENOUS
Status: COMPLETED | OUTPATIENT
Start: 2023-06-24 | End: 2023-06-24

## 2023-06-24 RX ADMIN — MORPHINE SULFATE 4 MG: 4 INJECTION INTRAVENOUS at 12:06

## 2023-06-24 RX ADMIN — ONDANSETRON 4 MG: 2 INJECTION INTRAMUSCULAR; INTRAVENOUS at 12:06

## 2023-06-24 NOTE — FIRST PROVIDER EVALUATION
Medical screening examination initiated.  I have conducted a focused provider triage encounter, findings are as follows:    Brief history of present illness:  66y/o M presents to the ED with abnormal MRI findings suggesting causa equina impingement. Contacted by PCP on today.   There were no vitals filed for this visit.    Pertinent physical exam:  AAA x 3    Brief workup plan:  MD evaluation. /labs    Preliminary workup initiated; this workup will be continued and followed by the physician or advanced practice provider that is assigned to the patient when roomed.

## 2023-06-24 NOTE — ED PROVIDER NOTES
"Encounter Date: 6/24/2023    SCRIBE #1 NOTE: I, Mukesh Martinez, am scribing for, and in the presence of,  Dr. Cary. I have scribed the following portions of the note - Other sections scribed: HPI, ROS, Physical Exam, MDM, Attending.     History     Chief Complaint   Patient presents with    Back Pain     Pt reports lower back pain onset 1 month. Denies trauma/injury. Also reports numbness /tingling in bilateral hands onset 1 month. Denies urinary/bowel incontinence. Had MRI 2 days ago and was told to come to ED for eval.       66 y/o male with history of HTN presents to ED for worsening lower back pain onset about a month ago.  Pt says he woke up one day and the pain was present.  He also complains of numbness/tingling to bilateral arms and legs, especially in his fingers and toes.  Pt says he went to a chiropractor after his pain began, which made his pain worse.  He had XR at urgent care recently, and he was told "he had nothing between his spines."  Pt had MRI 2 days ago at [facility] and was told to come here today for further eval.  Pt has been using walker to ambulate.  He is not on thinners.  He denies bowel or bladder incontinence.      The history is provided by the patient.   Back Pain   This is a new problem. Illness onset: about a month ago. The problem occurs constantly. The problem has been gradually worsening. The pain is associated with no known injury. Associated symptoms include numbness (bilateral arms and legs), paresthesias and tingling. Pertinent negatives include no bowel incontinence and no bladder incontinence.   Review of patient's allergies indicates:  No Known Allergies  No past medical history on file.  Past Surgical History:   Procedure Laterality Date    ARTHROSCOPY OF KNEE  1974    COLONOSCOPY  07/16/2021    UMBILICAL HERNIA REPAIR       Family History   Problem Relation Age of Onset    Dementia Mother     Rheum arthritis Mother     Heart disease Father     Hypertension Father      Social " History     Tobacco Use    Smoking status: Never    Smokeless tobacco: Never   Substance Use Topics    Alcohol use: Not Currently    Drug use: Yes     Types: Marijuana     Comment: Gummies     Review of Systems   Gastrointestinal:  Negative for bowel incontinence.   Genitourinary:  Negative for bladder incontinence.   Musculoskeletal:  Positive for back pain.   Neurological:  Positive for tingling, numbness (bilateral arms and legs) and paresthesias.        Denies bowel/bladder incontinence      Physical Exam     Initial Vitals [06/24/23 1159]   BP Pulse Resp Temp SpO2   (!) 156/78 86 18 98.5 °F (36.9 °C) 98 %      MAP       --         Physical Exam    Nursing note and vitals reviewed.  Constitutional: He appears well-developed and well-nourished. He is not diaphoretic. No distress.   HENT:   Head: Normocephalic and atraumatic.   Right Ear: External ear normal.   Left Ear: External ear normal.   Eyes: Conjunctivae are normal.   Neck: Neck supple.   Normal range of motion.  Cardiovascular:  Normal rate.           Pulmonary/Chest: No respiratory distress.   Abdominal: He exhibits no distension.   Musculoskeletal:         General: Normal range of motion.      Cervical back: Normal range of motion and neck supple.      Comments: No midline spinal tenderness      Neurological: He is alert and oriented to person, place, and time. GCS score is 15. GCS eye subscore is 4. GCS verbal subscore is 5. GCS motor subscore is 6.   Normal motor strength to bilateral LE's; diminished sensation from hips down bilaterally; 2+ DTRs to bilateral patella and achilles   Skin: Skin is warm and dry. No pallor.   Psychiatric: He has a normal mood and affect.       ED Course   Procedures  Labs Reviewed   COMPREHENSIVE METABOLIC PANEL - Abnormal; Notable for the following components:       Result Value    Sodium Level 134 (*)     Carbon Dioxide 22 (*)     Glucose Level 116 (*)     All other components within normal limits   CBC WITH  DIFFERENTIAL - Abnormal; Notable for the following components:    MCH 31.3 (*)     MPV 11.7 (*)     All other components within normal limits   CBC W/ AUTO DIFFERENTIAL    Narrative:     The following orders were created for panel order CBC Auto Differential.  Procedure                               Abnormality         Status                     ---------                               -----------         ------                     CBC with Differential[[id number]]        Abnormal            Final result                 Please view results for these tests on the individual orders.          Imaging Results    None          Medications   morphine injection 4 mg (4 mg Intravenous Given 6/24/23 1244)   ondansetron injection 4 mg (4 mg Intravenous Given 6/24/23 1244)              Scribe Attestation:   Scribe #1: I performed the above scribed service and the documentation accurately describes the services I performed. I attest to the accuracy of the note.    Attending Attestation:           Physician Attestation for Scribe:  Physician Attestation Statement for Scribe #1: I, reviewed documentation, as scribed by Mukesh Martinez in my presence, and it is both accurate and complete.         Medical Decision Making  66 yo male with low back pain with BLE numbness, no incontinence. Normal motor strength. Recent MRI of lumbar spine.       Differential diagnoses include, but are not limited to: spinal stenosis, radiculopathy, vertebral fracture, cauda equina syndrome, strain       ED management:  Morphine for pain  Discussed with NSGY, recs in ED course and patient agreeable     Problems Addressed:  Acute midline low back pain with bilateral sciatica: acute illness or injury that poses a threat to life or bodily functions  Spinal stenosis of lumbar region without neurogenic claudication: acute illness or injury that poses a threat to life or bodily functions    Amount and/or Complexity of Data Reviewed  External Data Reviewed:  radiology.     Details: FINDINGS:  No fracture or destructive lesion evident.  The distal spinal cord and conus are grossly normal.     Vertebral body heights maintained.     T12-L1 demonstrates a left paracentral desiccated disc versus osteophytic protrusion extending 3-4 mm beyond the disc plane, no significant spinal canal or foraminal narrowing.     L1-L2 demonstrates severe disc space narrowing, severe broad-based disc bulge, ligament flavum thickening, moderate spinal canal narrowing with reduction of the cross-sectional diameter to 6 mm, effacement of the CSF, mild foraminal narrowing with extraforaminal nerve root contact bilaterally.     L2-L3 demonstrates complete obliteration of the disc space, moderate osteophytic change along the endplate margin, borderline spinal canal narrowing.     L3-L4 moderate disc space narrowing, moderate severe disc bulge, mild spinal canal narrowing with reduction of the AP diameter to 5-6 mm, partial effacement of the CSF, mild foraminal narrowing on the right.  Bilateral lateral recess narrowing.     L4-5 demonstrates severe disc space narrowing, severe marginal osteophytic change and desiccated disc bulge, prominent posterior epidural fat, severe spinal canal narrowing with reduction of the AP diameter to 2-3 mm, near complete effacement of the CSF, severe foraminal narrowing with nerve root distortion and suspected impingement bilaterally.     L5-S1 demonstrates moderate disc space narrowing, vacuum disc, 3-4 mm anterolisthesis, severe disc bulge, bilateral pars defects, moderate foraminal narrowing right greater than left with nerve root distortion on the right.     Intra-abdominal organs and paraspinal soft tissues normal.     Impression:     L4-5 demonstrates severe osteophytic change, disc bulging along the posterior endplate margin, prominent posterior epidural fat resulting in severe spinal canal narrowing with reduction of the AP diameter to 2 or 3 mm, near complete  effacement of CSF.  There is proximal nerve root buckling at the mid to upper lumbar spine suggesting cauda equina impingement.     L1-L2 demonstrates severe disc space narrowing, severe disc bulge, ligament flavum thickening, moderate severe spinal canal narrowing with near complete effacement of CSF.     Multilevel spondylosis as above.  Labs: ordered. Decision-making details documented in ED Course.    Risk  Prescription drug management.  Parenteral controlled substances.            ED Course as of 06/24/23 1509   Sat Jun 24, 2023   1240 Consult: NSGY- Spoke with Dr Flannery who will review images  [KM]   1306 Discussed with patient and neurosurgery, patient is agreeable with following up outpatient in neurosurgery clinic this week to discuss outpatient surgical options.  Patient is currently ambulating well with a walker and has no bowel or bladder incontinence.  We will discharge with gabapentin as well as narcotic and I gave him strict ED return precautions [KM]      ED Course User Index  [KM] Abeba Cary MD                 Clinical Impression:   Final diagnoses:  [M48.061] Spinal stenosis of lumbar region without neurogenic claudication (Primary)  [M54.42, M54.41] Acute midline low back pain with bilateral sciatica        ED Disposition Condition    Discharge Stable          ED Prescriptions       Medication Sig Dispense Start Date End Date Auth. Provider    HYDROcodone-acetaminophen (NORCO)  mg per tablet Take 1 tablet by mouth every 6 (six) hours as needed for Pain. 15 tablet 6/24/2023 6/29/2023 Abeba Cary MD    gabapentin (NEURONTIN) 300 MG capsule Take 1 capsule (300 mg total) by mouth 3 (three) times daily. 90 capsule 6/24/2023 7/24/2023 Abeba Cary MD          Follow-up Information       Follow up With Specialties Details Why Contact Info    [facility] - Emergency Dept Emergency Medicine  As needed, If symptoms worsen [address] [facility]  [id number]  [phone]    Kael Flannery MD Neurosurgery  someone from his office should be calling with appt this week in [place] with neurosurgeon [address] [facility] [address] [facility]  [address]  [facility] [address] [phone]               Abeba Cary MD  06/24/23 3038

## 2023-06-24 NOTE — TELEPHONE ENCOUNTER
"I was able to reach pt by phone at 0933 this am. He relates "I knew it was bad because I've been having a hard time." I advised ED precautions ASAP. He relates that he will contact his daughter to try and bring him to [facility].     Will follow-up  "

## 2023-07-04 DIAGNOSIS — F51.01 PRIMARY INSOMNIA: ICD-10-CM

## 2023-07-12 ENCOUNTER — TELEPHONE (OUTPATIENT)
Dept: NEUROSURGERY | Facility: CLINIC | Age: 68
End: 2023-07-12
Payer: COMMERCIAL

## 2023-07-12 ENCOUNTER — OFFICE VISIT (OUTPATIENT)
Dept: INTERNAL MEDICINE | Facility: CLINIC | Age: 68
End: 2023-07-12
Payer: COMMERCIAL

## 2023-07-12 VITALS
HEART RATE: 79 BPM | DIASTOLIC BLOOD PRESSURE: 70 MMHG | BODY MASS INDEX: 33.65 KG/M2 | TEMPERATURE: 98 F | WEIGHT: 262.19 LBS | SYSTOLIC BLOOD PRESSURE: 136 MMHG | HEIGHT: 74 IN

## 2023-07-12 DIAGNOSIS — M54.9 DORSALGIA, UNSPECIFIED: Primary | ICD-10-CM

## 2023-07-12 DIAGNOSIS — G83.4 CAUDA EQUINA SYNDROME: ICD-10-CM

## 2023-07-12 DIAGNOSIS — M54.9 BACK PAIN, UNSPECIFIED BACK LOCATION, UNSPECIFIED BACK PAIN LATERALITY, UNSPECIFIED CHRONICITY: Primary | ICD-10-CM

## 2023-07-12 DIAGNOSIS — R73.03 PREDIABETES: ICD-10-CM

## 2023-07-12 PROCEDURE — 1159F PR MEDICATION LIST DOCUMENTED IN MEDICAL RECORD: ICD-10-PCS | Mod: CPTII,,, | Performed by: NURSE PRACTITIONER

## 2023-07-12 PROCEDURE — 3078F DIAST BP <80 MM HG: CPT | Mod: CPTII,,, | Performed by: NURSE PRACTITIONER

## 2023-07-12 PROCEDURE — 3008F PR BODY MASS INDEX (BMI) DOCUMENTED: ICD-10-PCS | Mod: CPTII,,, | Performed by: NURSE PRACTITIONER

## 2023-07-12 PROCEDURE — 1100F PTFALLS ASSESS-DOCD GE2>/YR: CPT | Mod: CPTII,,, | Performed by: NURSE PRACTITIONER

## 2023-07-12 PROCEDURE — 3075F PR MOST RECENT SYSTOLIC BLOOD PRESS GE 130-139MM HG: ICD-10-PCS | Mod: CPTII,,, | Performed by: NURSE PRACTITIONER

## 2023-07-12 PROCEDURE — 3078F PR MOST RECENT DIASTOLIC BLOOD PRESSURE < 80 MM HG: ICD-10-PCS | Mod: CPTII,,, | Performed by: NURSE PRACTITIONER

## 2023-07-12 PROCEDURE — 99215 OFFICE O/P EST HI 40 MIN: CPT | Mod: S$PBB,,, | Performed by: NURSE PRACTITIONER

## 2023-07-12 PROCEDURE — 3044F PR MOST RECENT HEMOGLOBIN A1C LEVEL <7.0%: ICD-10-PCS | Mod: CPTII,,, | Performed by: NURSE PRACTITIONER

## 2023-07-12 PROCEDURE — 3008F BODY MASS INDEX DOCD: CPT | Mod: CPTII,,, | Performed by: NURSE PRACTITIONER

## 2023-07-12 PROCEDURE — 1160F PR REVIEW ALL MEDS BY PRESCRIBER/CLIN PHARMACIST DOCUMENTED: ICD-10-PCS | Mod: CPTII,,, | Performed by: NURSE PRACTITIONER

## 2023-07-12 PROCEDURE — 1100F PR PT FALLS ASSESS DOC 2+ FALLS/FALL W/INJURY/YR: ICD-10-PCS | Mod: CPTII,,, | Performed by: NURSE PRACTITIONER

## 2023-07-12 PROCEDURE — 3288F PR FALLS RISK ASSESSMENT DOCUMENTED: ICD-10-PCS | Mod: CPTII,,, | Performed by: NURSE PRACTITIONER

## 2023-07-12 PROCEDURE — 4010F ACE/ARB THERAPY RXD/TAKEN: CPT | Mod: CPTII,,, | Performed by: NURSE PRACTITIONER

## 2023-07-12 PROCEDURE — 99215 PR OFFICE/OUTPT VISIT, EST, LEVL V, 40-54 MIN: ICD-10-PCS | Mod: S$PBB,,, | Performed by: NURSE PRACTITIONER

## 2023-07-12 PROCEDURE — 1159F MED LIST DOCD IN RCRD: CPT | Mod: CPTII,,, | Performed by: NURSE PRACTITIONER

## 2023-07-12 PROCEDURE — 3075F SYST BP GE 130 - 139MM HG: CPT | Mod: CPTII,,, | Performed by: NURSE PRACTITIONER

## 2023-07-12 PROCEDURE — 3288F FALL RISK ASSESSMENT DOCD: CPT | Mod: CPTII,,, | Performed by: NURSE PRACTITIONER

## 2023-07-12 PROCEDURE — 3044F HG A1C LEVEL LT 7.0%: CPT | Mod: CPTII,,, | Performed by: NURSE PRACTITIONER

## 2023-07-12 PROCEDURE — 99214 OFFICE O/P EST MOD 30 MIN: CPT | Mod: PBBFAC | Performed by: NURSE PRACTITIONER

## 2023-07-12 PROCEDURE — 1160F RVW MEDS BY RX/DR IN RCRD: CPT | Mod: CPTII,,, | Performed by: NURSE PRACTITIONER

## 2023-07-12 PROCEDURE — 4010F PR ACE/ARB THEARPY RXD/TAKEN: ICD-10-PCS | Mod: CPTII,,, | Performed by: NURSE PRACTITIONER

## 2023-07-12 RX ORDER — GABAPENTIN 300 MG/1
300 CAPSULE ORAL 3 TIMES DAILY
Qty: 90 CAPSULE | Refills: 0 | Status: SHIPPED | OUTPATIENT
Start: 2023-07-12 | End: 2023-11-30

## 2023-07-12 RX ORDER — ACETAMINOPHEN AND CODEINE PHOSPHATE 300; 30 MG/1; MG/1
1 TABLET ORAL EVERY 8 HOURS PRN
Qty: 9 TABLET | Refills: 0 | Status: SHIPPED | OUTPATIENT
Start: 2023-07-12 | End: 2023-07-15
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
## 2023-07-12 NOTE — TELEPHONE ENCOUNTER
Returned call to Jerome, the pt's NP, she stated that they placed a referral in the system for the pt on 6/24. I stated to Jerome that we did receive the referral and 's next available is not until September. Jerome requested to see someone else due to the pt's condition getting worse. I stated that we have another surgeon who has more availability. Scheduled pt to see  on Tuesday, July 18 at 220 pm and Jerome stated that she will call the pt;s daughter to inform her

## 2023-07-12 NOTE — ASSESSMENT & PLAN NOTE
I placed a call to call center for Dr. Flannery' office. Awaiting call back regarding referral/need for appt. Will relay info to patient once received  Will c/s CM for assistance with DM  ER precautions  Fall-proof home    *received call from Dr. Flannery' office after visit was completed. They were able to schedule patient with Dr. Clark 7/18/23 @ 220 with arrival at 130P. This info was directly communicated to pt's dtrNara, per pt/dtr's request.

## 2023-07-13 ENCOUNTER — TELEPHONE (OUTPATIENT)
Dept: ADMINISTRATIVE | Facility: HOSPITAL | Age: 68
End: 2023-07-13
Payer: COMMERCIAL

## 2023-07-14 ENCOUNTER — PATIENT OUTREACH (OUTPATIENT)
Dept: ADMINISTRATIVE | Facility: OTHER | Age: 68
End: 2023-07-14
Payer: COMMERCIAL

## 2023-07-14 RX ORDER — TRAZODONE HYDROCHLORIDE 50 MG/1
25-50 TABLET ORAL NIGHTLY
Qty: 30 TABLET | Refills: 1 | Status: SHIPPED | OUTPATIENT
Start: 2023-07-14 | End: 2023-07-28

## 2023-07-17 NOTE — PROGRESS NOTES
CHW - Initial Contact    This Community Health Worker completed the Social Determinant of Health questionnaire with patient via telephone today.    Pt identified barriers of most importance are: no real needs, ramp getting put in, daughter helps with the groceries, finding someone to help mow the lawn.   Referrals to community agencies completed with patient/caregiver consent outside of [facility] include: [place] [facility]  Referrals were put through [facility] - N/A  Support and Services: still pretty sad from losing spouse this year, but has a ton of support from so many people   Other information discussed the patient needs / wants help with: we agreed to a follow up in a month     Follow-up Outreach - Due: 8/21/2023

## 2023-07-18 ENCOUNTER — OFFICE VISIT (OUTPATIENT)
Dept: NEUROSURGERY | Facility: CLINIC | Age: 68
End: 2023-07-18
Payer: COMMERCIAL

## 2023-07-18 ENCOUNTER — HOSPITAL ENCOUNTER (OUTPATIENT)
Dept: RADIOLOGY | Facility: HOSPITAL | Age: 68
Discharge: HOME | End: 2023-07-18
Attending: NEUROLOGICAL SURGERY
Payer: COMMERCIAL

## 2023-07-18 VITALS — BODY MASS INDEX: 33.62 KG/M2 | WEIGHT: 262 LBS | HEIGHT: 74 IN

## 2023-07-18 DIAGNOSIS — G95.9 CERVICAL MYELOPATHY: ICD-10-CM

## 2023-07-18 DIAGNOSIS — M48.02 SPINAL STENOSIS, CERVICAL REGION: ICD-10-CM

## 2023-07-18 DIAGNOSIS — M54.9 BACK PAIN, UNSPECIFIED BACK LOCATION, UNSPECIFIED BACK PAIN LATERALITY, UNSPECIFIED CHRONICITY: ICD-10-CM

## 2023-07-18 DIAGNOSIS — G95.9 CERVICAL MYELOPATHY: Primary | ICD-10-CM

## 2023-07-18 DIAGNOSIS — M48.061 SPINAL STENOSIS OF LUMBAR REGION WITHOUT NEUROGENIC CLAUDICATION: ICD-10-CM

## 2023-07-18 PROCEDURE — 4010F ACE/ARB THERAPY RXD/TAKEN: CPT | Mod: CPTII,S$GLB,, | Performed by: NEUROLOGICAL SURGERY

## 2023-07-18 PROCEDURE — 72050 XR CERVICAL SPINE AP LAT WITH FLEX EXTEN: ICD-10-PCS | Mod: 26,,, | Performed by: RADIOLOGY

## 2023-07-18 PROCEDURE — 3008F PR BODY MASS INDEX (BMI) DOCUMENTED: ICD-10-PCS | Mod: CPTII,S$GLB,, | Performed by: NEUROLOGICAL SURGERY

## 2023-07-18 PROCEDURE — 72050 X-RAY EXAM NECK SPINE 4/5VWS: CPT | Mod: 26,,, | Performed by: RADIOLOGY

## 2023-07-18 PROCEDURE — 4010F PR ACE/ARB THEARPY RXD/TAKEN: ICD-10-PCS | Mod: CPTII,S$GLB,, | Performed by: NEUROLOGICAL SURGERY

## 2023-07-18 PROCEDURE — 99205 OFFICE O/P NEW HI 60 MIN: CPT | Mod: S$GLB,,, | Performed by: NEUROLOGICAL SURGERY

## 2023-07-18 PROCEDURE — 3044F PR MOST RECENT HEMOGLOBIN A1C LEVEL <7.0%: ICD-10-PCS | Mod: CPTII,S$GLB,, | Performed by: NEUROLOGICAL SURGERY

## 2023-07-18 PROCEDURE — 99205 PR OFFICE/OUTPT VISIT, NEW, LEVL V, 60-74 MIN: ICD-10-PCS | Mod: S$GLB,,, | Performed by: NEUROLOGICAL SURGERY

## 2023-07-18 PROCEDURE — 72110 X-RAY EXAM L-2 SPINE 4/>VWS: CPT | Mod: 26,,, | Performed by: RADIOLOGY

## 2023-07-18 PROCEDURE — 3008F BODY MASS INDEX DOCD: CPT | Mod: CPTII,S$GLB,, | Performed by: NEUROLOGICAL SURGERY

## 2023-07-18 PROCEDURE — 3288F FALL RISK ASSESSMENT DOCD: CPT | Mod: CPTII,S$GLB,, | Performed by: NEUROLOGICAL SURGERY

## 2023-07-18 PROCEDURE — 1100F PR PT FALLS ASSESS DOC 2+ FALLS/FALL W/INJURY/YR: ICD-10-PCS | Mod: CPTII,S$GLB,, | Performed by: NEUROLOGICAL SURGERY

## 2023-07-18 PROCEDURE — 1100F PTFALLS ASSESS-DOCD GE2>/YR: CPT | Mod: CPTII,S$GLB,, | Performed by: NEUROLOGICAL SURGERY

## 2023-07-18 PROCEDURE — 1125F AMNT PAIN NOTED PAIN PRSNT: CPT | Mod: CPTII,S$GLB,, | Performed by: NEUROLOGICAL SURGERY

## 2023-07-18 PROCEDURE — 3288F PR FALLS RISK ASSESSMENT DOCUMENTED: ICD-10-PCS | Mod: CPTII,S$GLB,, | Performed by: NEUROLOGICAL SURGERY

## 2023-07-18 PROCEDURE — 72110 XR LUMBAR SPINE AP AND LAT WITH FLEX/EXT: ICD-10-PCS | Mod: 26,,, | Performed by: RADIOLOGY

## 2023-07-18 PROCEDURE — 1125F PR PAIN SEVERITY QUANTIFIED, PAIN PRESENT: ICD-10-PCS | Mod: CPTII,S$GLB,, | Performed by: NEUROLOGICAL SURGERY

## 2023-07-18 PROCEDURE — 3044F HG A1C LEVEL LT 7.0%: CPT | Mod: CPTII,S$GLB,, | Performed by: NEUROLOGICAL SURGERY

## 2023-07-18 PROCEDURE — 1159F PR MEDICATION LIST DOCUMENTED IN MEDICAL RECORD: ICD-10-PCS | Mod: CPTII,S$GLB,, | Performed by: NEUROLOGICAL SURGERY

## 2023-07-18 PROCEDURE — 72110 X-RAY EXAM L-2 SPINE 4/>VWS: CPT | Mod: TC,FY

## 2023-07-18 PROCEDURE — 72050 X-RAY EXAM NECK SPINE 4/5VWS: CPT | Mod: TC,FY

## 2023-07-18 PROCEDURE — 1159F MED LIST DOCD IN RCRD: CPT | Mod: CPTII,S$GLB,, | Performed by: NEUROLOGICAL SURGERY

## 2023-07-18 NOTE — H&P (VIEW-ONLY)
NEUROSURGICAL OUTPATIENT CONSULTATION NOTE    DATE OF SERVICE:  07/18/2023    ATTENDING PHYSICIAN:  Steven Clark MD    CONSULT REQUESTED BY:  Abeba Cary     REASON FOR CONSULT:  Weakness in arms and legs x 2 months    HISTORY OF PRESENT ILLNESS:  This is a very pleasant 67 y.o. male who noted   Sudden weakness in his arms and legs starting 2 months ago.  There was no inciting event.  Feels numbness in the hands and weakness in the hands.  He has difficulty walking and is now requiring a wheelchair.  He presented to an outside chiropractor and then was seen in an urgent care center with x-rays the led to an MRI of his lumbar spine.  He did not bring his x-rays but I do have copy of the lumbar MRI.  No bowel or bladder issues at this time.    PAST MEDICAL HISTORY:  Active Ambulatory Problems     Diagnosis Date Noted    Hypertension 06/09/2022    Hyperlipidemia 06/09/2022    Vitamin D deficiency 06/09/2022    Obesity 06/09/2022    Arthritis 06/09/2022    Wellness examination 06/10/2022    Prediabetes 06/10/2022    Primary insomnia 06/10/2022    Erectile dysfunction 06/10/2022    Pain in both wrists 06/12/2023    Dorsalgia 06/12/2023    Grief 06/12/2023    Cauda equina syndrome 07/12/2023     Resolved Ambulatory Problems     Diagnosis Date Noted    No Resolved Ambulatory Problems     No Additional Past Medical History       PAST SURGICAL HISTORY:  Past Surgical History:   Procedure Laterality Date    ARTHROSCOPY OF KNEE  1974    COLONOSCOPY  07/16/2021    UMBILICAL HERNIA REPAIR         SOCIAL HISTORY:   Social History     Socioeconomic History    Marital status:      Spouse name: Barbara    Number of children: 2   Tobacco Use    Smoking status: Never    Smokeless tobacco: Never   Substance and Sexual Activity    Alcohol use: Not Currently    Drug use: Not Currently     Types: Marijuana     Comment: Gummies    Sexual activity: Not Currently     Partners: Female     Social Determinants of Health     Financial  Resource Strain: Low Risk     Difficulty of Paying Living Expenses: Not very hard   Food Insecurity: No Food Insecurity    Worried About Running Out of Food in the Last Year: Never true    Ran Out of Food in the Last Year: Never true   Transportation Needs: No Transportation Needs    Lack of Transportation (Medical): No    Lack of Transportation (Non-Medical): No   Physical Activity: Insufficiently Active    Days of Exercise per Week: 2 days    Minutes of Exercise per Session: 30 min   Stress: Stress Concern Present    Feeling of Stress : Very much   Social Connections: Moderately Isolated    Frequency of Communication with Friends and Family: More than three times a week    Frequency of Social Gatherings with Friends and Family: More than three times a week    Attends Alev[employer] Services: More than 4 times per year    Active Member of Clubs or Organizations: No    Attends Club or Organization Meetings: Never    Marital Status:    Housing Stability: Low Risk     Unable to Pay for Housing in the Last Year: No    Number of Places Lived in the Last Year: 1    Unstable Housing in the Last Year: No       FAMILY HISTORY:  Family History   Problem Relation Age of Onset    Dementia Mother     Rheum arthritis Mother     Heart disease Father     Hypertension Father        CURRENTS MEDICATIONS:  Current Outpatient Medications on File Prior to Visit   Medication Sig Dispense Refill    atorvastatin (LIPITOR) 10 MG tablet Take 1 tablet (10 mg total) by mouth every evening. 90 tablet 1    diclofenac (VOLTAREN) 75 MG EC tablet Take 75 mg by mouth 2 (two) times daily as needed for Pain.      gabapentin (NEURONTIN) 300 MG capsule Take 1 capsule (300 mg total) by mouth 3 (three) times daily. 90 capsule 0    hydroCHLOROthiazide (HYDRODIURIL) 25 MG tablet Take 1 tablet (25 mg total) by mouth once daily. 90 tablet 1    lisinopriL (PRINIVIL,ZESTRIL) 40 MG tablet Take 1 tablet (40 mg total) by mouth once daily. 90 tablet 1     metoprolol succinate (TOPROL-XL) 50 MG 24 hr tablet Take 1 tablet (50 mg total) by mouth once daily. 90 tablet 1    tadalafiL (CIALIS) 10 MG tablet Take 1 tablet (10 mg total) by mouth daily as needed for Erectile Dysfunction. 10 tablet 1    traZODone (DESYREL) 50 MG tablet TAKE 0.5-1 TABLETS (25-50 MG TOTAL) BY MOUTH EVERY EVENING. 30 tablet 1     No current facility-administered medications on file prior to visit.       ALLERGIES:  Review of patient's allergies indicates:  No Known Allergies    REVIEW OF SYSTEMS:  ROS - per HPI    OBJECTIVE:    PHYSICAL EXAMINATION:   There were no vitals filed for this visit.      Bilateral  strength is 4-  Positive Felice sign bilateral  Lower extremities with diffuse hyperreflexia   Two beats clonus at the bilateral ankles    DIAGNOSTIC DATA:  I personally interpreted the following imaging:      MRI lumbar spine shows central stenosis moderate to severe from L1 down to the sacrum.  He appears to have L5 pars defects and listhesis at L5-S1.    ASSESMENT:  This is a 67 y.o. male with     Problem List Items Addressed This Visit    None  Visit Diagnoses       Cervical myelopathy    -  Primary    Relevant Orders    MRI Cervical Spine Without Contrast    X-Ray Lumbar Spine Ap Lateral w/Flex Ext    X-Ray Cervical Spine AP Lat with Flexion  Extension    Spinal stenosis of lumbar region without neurogenic claudication        Relevant Orders    MRI Cervical Spine Without Contrast    X-Ray Lumbar Spine Ap Lateral w/Flex Ext    X-Ray Cervical Spine AP Lat with Flexion  Extension    Spinal stenosis, cervical region        Relevant Orders    MRI Cervical Spine Without Contrast            PLAN:  Diagnosis:   He has severe lumbar stenosis with likely pars defects at L5.  Clinically, he presents more as a cervical myelopathy.  I suspect cervical stenosis    Plan:  urgent MRI cervical spine in addition to x-ray imaging of lumbar and cervical    Follow-up:  once cervical MRI is  complete    Steven Clark MD, FAANS    Disclaimer: This note was partly generated using dictation software which may occasionally result in transcription errors.

## 2023-07-18 NOTE — PROGRESS NOTES
NEUROSURGICAL OUTPATIENT CONSULTATION NOTE    DATE OF SERVICE:  07/18/2023    ATTENDING PHYSICIAN:  Steven Clark MD    CONSULT REQUESTED BY:  Abeba Cary     REASON FOR CONSULT:  Weakness in arms and legs x 2 months    HISTORY OF PRESENT ILLNESS:  This is a very pleasant 67 y.o. male who noted   Sudden weakness in his arms and legs starting 2 months ago.  There was no inciting event.  Feels numbness in the hands and weakness in the hands.  He has difficulty walking and is now requiring a wheelchair.  He presented to an outside chiropractor and then was seen in an urgent care center with x-rays the led to an MRI of his lumbar spine.  He did not bring his x-rays but I do have copy of the lumbar MRI.  No bowel or bladder issues at this time.    PAST MEDICAL HISTORY:  Active Ambulatory Problems     Diagnosis Date Noted    Hypertension 06/09/2022    Hyperlipidemia 06/09/2022    Vitamin D deficiency 06/09/2022    Obesity 06/09/2022    Arthritis 06/09/2022    Wellness examination 06/10/2022    Prediabetes 06/10/2022    Primary insomnia 06/10/2022    Erectile dysfunction 06/10/2022    Pain in both wrists 06/12/2023    Dorsalgia 06/12/2023    Grief 06/12/2023    Cauda equina syndrome 07/12/2023     Resolved Ambulatory Problems     Diagnosis Date Noted    No Resolved Ambulatory Problems     No Additional Past Medical History       PAST SURGICAL HISTORY:  Past Surgical History:   Procedure Laterality Date    ARTHROSCOPY OF KNEE  1974    COLONOSCOPY  07/16/2021    UMBILICAL HERNIA REPAIR         SOCIAL HISTORY:   Social History     Socioeconomic History    Marital status:      Spouse name: Barbara    Number of children: 2   Tobacco Use    Smoking status: Never    Smokeless tobacco: Never   Substance and Sexual Activity    Alcohol use: Not Currently    Drug use: Not Currently     Types: Marijuana     Comment: Gummies    Sexual activity: Not Currently     Partners: Female     Social Determinants of Health     Financial  Resource Strain: Low Risk     Difficulty of Paying Living Expenses: Not very hard   Food Insecurity: No Food Insecurity    Worried About Running Out of Food in the Last Year: Never true    Ran Out of Food in the Last Year: Never true   Transportation Needs: No Transportation Needs    Lack of Transportation (Medical): No    Lack of Transportation (Non-Medical): No   Physical Activity: Insufficiently Active    Days of Exercise per Week: 2 days    Minutes of Exercise per Session: 30 min   Stress: Stress Concern Present    Feeling of Stress : Very much   Social Connections: Moderately Isolated    Frequency of Communication with Friends and Family: More than three times a week    Frequency of Social Gatherings with Friends and Family: More than three times a week    Attends [employer] Services: More than 4 times per year    Active Member of Clubs or Organizations: No    Attends Club or Organization Meetings: Never    Marital Status:    Housing Stability: Low Risk     Unable to Pay for Housing in the Last Year: No    Number of Places Lived in the Last Year: 1    Unstable Housing in the Last Year: No       FAMILY HISTORY:  Family History   Problem Relation Age of Onset    Dementia Mother     Rheum arthritis Mother     Heart disease Father     Hypertension Father        CURRENTS MEDICATIONS:  Current Outpatient Medications on File Prior to Visit   Medication Sig Dispense Refill    atorvastatin (LIPITOR) 10 MG tablet Take 1 tablet (10 mg total) by mouth every evening. 90 tablet 1    diclofenac (VOLTAREN) 75 MG EC tablet Take 75 mg by mouth 2 (two) times daily as needed for Pain.      gabapentin (NEURONTIN) 300 MG capsule Take 1 capsule (300 mg total) by mouth 3 (three) times daily. 90 capsule 0    hydroCHLOROthiazide (HYDRODIURIL) 25 MG tablet Take 1 tablet (25 mg total) by mouth once daily. 90 tablet 1    lisinopriL (PRINIVIL,ZESTRIL) 40 MG tablet Take 1 tablet (40 mg total) by mouth once daily. 90 tablet 1     metoprolol succinate (TOPROL-XL) 50 MG 24 hr tablet Take 1 tablet (50 mg total) by mouth once daily. 90 tablet 1    tadalafiL (CIALIS) 10 MG tablet Take 1 tablet (10 mg total) by mouth daily as needed for Erectile Dysfunction. 10 tablet 1    traZODone (DESYREL) 50 MG tablet TAKE 0.5-1 TABLETS (25-50 MG TOTAL) BY MOUTH EVERY EVENING. 30 tablet 1     No current facility-administered medications on file prior to visit.       ALLERGIES:  Review of patient's allergies indicates:  No Known Allergies    REVIEW OF SYSTEMS:  ROS - per HPI    OBJECTIVE:    PHYSICAL EXAMINATION:   There were no vitals filed for this visit.      Bilateral  strength is 4-  Positive Felice sign bilateral  Lower extremities with diffuse hyperreflexia   Two beats clonus at the bilateral ankles    DIAGNOSTIC DATA:  I personally interpreted the following imaging:      MRI lumbar spine shows central stenosis moderate to severe from L1 down to the sacrum.  He appears to have L5 pars defects and listhesis at L5-S1.    ASSESMENT:  This is a 67 y.o. male with     Problem List Items Addressed This Visit    None  Visit Diagnoses       Cervical myelopathy    -  Primary    Relevant Orders    MRI Cervical Spine Without Contrast    X-Ray Lumbar Spine Ap Lateral w/Flex Ext    X-Ray Cervical Spine AP Lat with Flexion  Extension    Spinal stenosis of lumbar region without neurogenic claudication        Relevant Orders    MRI Cervical Spine Without Contrast    X-Ray Lumbar Spine Ap Lateral w/Flex Ext    X-Ray Cervical Spine AP Lat with Flexion  Extension    Spinal stenosis, cervical region        Relevant Orders    MRI Cervical Spine Without Contrast            PLAN:  Diagnosis:   He has severe lumbar stenosis with likely pars defects at L5.  Clinically, he presents more as a cervical myelopathy.  I suspect cervical stenosis    Plan:  urgent MRI cervical spine in addition to x-ray imaging of lumbar and cervical    Follow-up:  once cervical MRI is  complete    Steven Clark MD, FAANS    Disclaimer: This note was partly generated using dictation software which may occasionally result in transcription errors.

## 2023-07-26 ENCOUNTER — HOSPITAL ENCOUNTER (OUTPATIENT)
Dept: RADIOLOGY | Facility: HOSPITAL | Age: 68
Discharge: HOME | End: 2023-07-26
Attending: NEUROLOGICAL SURGERY
Payer: COMMERCIAL

## 2023-07-26 DIAGNOSIS — G95.9 CERVICAL MYELOPATHY: ICD-10-CM

## 2023-07-26 DIAGNOSIS — M48.02 SPINAL STENOSIS, CERVICAL REGION: ICD-10-CM

## 2023-07-26 DIAGNOSIS — M48.061 SPINAL STENOSIS OF LUMBAR REGION WITHOUT NEUROGENIC CLAUDICATION: ICD-10-CM

## 2023-07-26 PROCEDURE — 72141 MRI NECK SPINE W/O DYE: CPT | Mod: TC

## 2023-07-28 ENCOUNTER — OFFICE VISIT (OUTPATIENT)
Dept: INTERNAL MEDICINE | Facility: CLINIC | Age: 68
End: 2023-07-28
Payer: COMMERCIAL

## 2023-07-28 ENCOUNTER — ANESTHESIA EVENT (OUTPATIENT)
Dept: SURGERY | Facility: HOSPITAL | Age: 68
DRG: 472 | End: 2023-07-28
Payer: COMMERCIAL

## 2023-07-28 VITALS
HEART RATE: 78 BPM | TEMPERATURE: 98 F | DIASTOLIC BLOOD PRESSURE: 78 MMHG | WEIGHT: 264 LBS | OXYGEN SATURATION: 99 % | HEIGHT: 74 IN | RESPIRATION RATE: 20 BRPM | SYSTOLIC BLOOD PRESSURE: 156 MMHG | BODY MASS INDEX: 33.88 KG/M2

## 2023-07-28 DIAGNOSIS — Z01.818 PREOPERATIVE CLEARANCE: Primary | ICD-10-CM

## 2023-07-28 DIAGNOSIS — E66.9 CLASS 1 OBESITY WITH BODY MASS INDEX (BMI) OF 33.0 TO 33.9 IN ADULT, UNSPECIFIED OBESITY TYPE, UNSPECIFIED WHETHER SERIOUS COMORBIDITY PRESENT: ICD-10-CM

## 2023-07-28 DIAGNOSIS — G95.20 SPINAL CORD COMPRESSION: ICD-10-CM

## 2023-07-28 DIAGNOSIS — E78.5 HYPERLIPIDEMIA, UNSPECIFIED HYPERLIPIDEMIA TYPE: ICD-10-CM

## 2023-07-28 DIAGNOSIS — I10 HYPERTENSION, UNSPECIFIED TYPE: ICD-10-CM

## 2023-07-28 DIAGNOSIS — R73.03 PREDIABETES: ICD-10-CM

## 2023-07-28 DIAGNOSIS — G83.4 CAUDA EQUINA SYNDROME: ICD-10-CM

## 2023-07-28 PROCEDURE — 99214 OFFICE O/P EST MOD 30 MIN: CPT | Mod: PBBFAC

## 2023-07-28 PROCEDURE — 93005 ELECTROCARDIOGRAM TRACING: CPT

## 2023-07-28 NOTE — PROGRESS NOTES
IM Clinic Note    Patient Name: Corey Montano  YOB: 1955   MRN: [id number]  Date: 07/28/2023  Home Address: [address] [place] [address] [address] Jeff [place]ES [id number]      Subjective:     Chief Complaint:   Chief Complaint   Patient presents with    Pre-op Exam     Pt here today for surgical clearance.         HPI:  Corey Montano is a 66 yo male with PMHx significant for Cadua Equina Syndrome, HTN, HLD, Prediabetes, Erectile Dysfunction, Obesity and Vitamin D deficiency who presents to clinic today for preoperative clearance for urgent laminectomy scheduled for 7/31/23 per Dr. Clark. Of note, patient was last seen by PCP on 7/12/23. Available notes and lab results since last visit were reviewed. Since last visit he reports persistent LBP and falls.Complains of bilateral groin pain which is aggravated by resting. Fall backward x 1 2 days ago, which happens often. Denies bowel or urinary incontinence. Of note, patient took ASA 81mg this AM. Patient reports medication compliance. Denies PMHx and Fhx of MI and stroke. Has previously had EKG, Echo, and 2 stress tests which results are not accessible but did not require further work-up and has never been followed by cardiology. Patient does not use tobacco products.     History reviewed. No pertinent past medical history.     Past Surgical History:   Procedure Laterality Date    ARTHROSCOPY OF KNEE  1974    COLONOSCOPY  07/16/2021    UMBILICAL HERNIA REPAIR         Allergy:  Review of patient's allergies indicates:  No Known Allergies     Current Medications:    Current Outpatient Medications:     atorvastatin (LIPITOR) 10 MG tablet, Take 1 tablet (10 mg total) by mouth every evening., Disp: 90 tablet, Rfl: 1    diclofenac (VOLTAREN) 75 MG EC tablet, Take 75 mg by mouth 2 (two) times daily as needed for Pain., Disp: , Rfl:     gabapentin (NEURONTIN) 300 MG capsule, Take 1 capsule (300 mg total) by mouth 3 (three) times daily., Disp: 90 capsule, Rfl: 0    hydroCHLOROthiazide (HYDRODIURIL)  25 MG tablet, Take 1 tablet (25 mg total) by mouth once daily., Disp: 90 tablet, Rfl: 1    lisinopriL (PRINIVIL,ZESTRIL) 40 MG tablet, Take 1 tablet (40 mg total) by mouth once daily., Disp: 90 tablet, Rfl: 1    metoprolol succinate (TOPROL-XL) 50 MG 24 hr tablet, Take 1 tablet (50 mg total) by mouth once daily., Disp: 90 tablet, Rfl: 1    tadalafiL (CIALIS) 10 MG tablet, Take 1 tablet (10 mg total) by mouth daily as needed for Erectile Dysfunction., Disp: 10 tablet, Rfl: 1    traZODone (DESYREL) 50 MG tablet, TAKE 0.5-1 TABLETS (25-50 MG TOTAL) BY MOUTH EVERY EVENING. (Patient not taking: Reported on 7/28/2023), Disp: 30 tablet, Rfl: 1     Social History:   reports that he has never smoked. He has never used smokeless tobacco. He reports that he does not currently use alcohol. He reports that he does not currently use drugs after having used the following drugs: Marijuana.     Family History   Problem Relation Age of Onset    Dementia Mother     Rheum arthritis Mother     Heart disease Father     Hypertension Father         Immunization History   Administered Date(s) Administered    COVID-19, MRNA, LN-S, PF (MODERNA FULL 0.5 ML DOSE) 03/16/2021, 04/13/2021, 12/07/2021    COVID-19, mRNA, LNP-S, bivalent booster, PF (PFIZER OMICRON) 11/07/2022, 11/08/2022    Influenza 11/25/2013    Influenza - Quadrivalent - High Dose - PF (65 years and older) 11/07/2022    Influenza - Quadrivalent - PF *Preferred* (6 months and older) 08/25/2018, 03/25/2019, 12/07/2021    Pneumococcal Conjugate - 20 Valent 12/12/2022    Tdap 05/15/2017, 06/15/2019       Review of Systems   Constitutional:  Negative for chills, diaphoresis, fever, malaise/fatigue and weight loss.   HENT:  Negative for congestion and sore throat.    Eyes:  Negative for blurred vision and double vision.   Respiratory:  Negative for cough, shortness of breath and wheezing.    Cardiovascular:  Positive for claudication. Negative for chest pain, palpitations, orthopnea,  "leg swelling and PND.   Gastrointestinal:  Negative for abdominal pain, blood in stool, diarrhea, melena, nausea and vomiting.   Genitourinary:  Negative for dysuria, flank pain and hematuria.   Musculoskeletal:  Positive for back pain, falls, joint pain and neck pain. Negative for myalgias.   Neurological:  Negative for dizziness, tingling, tremors, focal weakness, seizures, loss of consciousness, weakness and headaches.   Psychiatric/Behavioral:  Negative for depression and substance abuse. The patient is not nervous/anxious.      Objective:      Physical Exam  Vitals:    07/28/23 0852   BP: (!) 157/84   BP Location: Left arm   Patient Position: Sitting   BP Method: Medium (Automatic)   Pulse: 78   Resp: 20   Temp: 98 °F (36.7 °C)   TempSrc: Oral   SpO2: 99%   Weight: 119.7 kg (264 lb)   Height: 6' 2" (1.88 m)        Wt Readings from Last 3 Encounters:   07/28/23 119.7 kg (264 lb)   07/18/23 118.8 kg (262 lb)   07/12/23 118.9 kg (262 lb 3.2 oz)       Physical Exam  Vitals and nursing note reviewed.   Constitutional:       General: He is not in acute distress.     Appearance: He is obese. He is not ill-appearing or diaphoretic.   Eyes:      General: No scleral icterus.     Pupils: Pupils are equal, round, and reactive to light.   Neck:      Vascular: Normal carotid pulses. No carotid bruit or JVD.   Cardiovascular:      Rate and Rhythm: Normal rate and regular rhythm.      Heart sounds: No murmur heard.  Pulmonary:      Effort: Pulmonary effort is normal. No respiratory distress.      Breath sounds: No wheezing.   Musculoskeletal:      Right hand: Swelling present.      Left hand: Swelling present.      Right lower leg: Edema present.      Left lower leg: Edema present.      Comments: Trace non-pitting edema, BLE.    Lymphadenopathy:      Cervical: No cervical adenopathy.   Skin:     General: Skin is warm and dry.      Capillary Refill: Capillary refill takes 2 to 3 seconds.   Neurological:      General: No focal " deficit present.      Mental Status: He is alert and oriented to person, place, and time.      GCS: GCS eye subscore is 4. GCS verbal subscore is 5. GCS motor subscore is 6.      Cranial Nerves: No cranial nerve deficit.      Motor: No weakness.      Comments: Ambulates with rolling walker        Body mass index is 33.9 kg/m².      Admission on 06/24/2023, Discharged on 06/24/2023   Component Date Value Ref Range Status    Sodium Level 06/24/2023 134 (L)  136 - 145 mmol/L Final    Potassium Level 06/24/2023 4.2  3.5 - 5.1 mmol/L Final    Chloride 06/24/2023 103  98 - 107 mmol/L Final    Carbon Dioxide 06/24/2023 22 (L)  23 - 31 mmol/L Final    Glucose Level 06/24/2023 116 (H)  82 - 115 mg/dL Final    Blood Urea Nitrogen 06/24/2023 13.9  8.4 - 25.7 mg/dL Final    Creatinine 06/24/2023 0.88  0.73 - 1.18 mg/dL Final    Calcium Level Total 06/24/2023 9.4  8.8 - 10.0 mg/dL Final    Protein Total 06/24/2023 7.0  5.8 - 7.6 gm/dL Final    Albumin Level 06/24/2023 3.9  3.4 - 4.8 g/dL Final    Globulin 06/24/2023 3.1  2.4 - 3.5 gm/dL Final    Albumin/Globulin Ratio 06/24/2023 1.3  1.1 - 2.0 ratio Final    Bilirubin Total 06/24/2023 0.5  <=1.5 mg/dL Final    Alkaline Phosphatase 06/24/2023 90  40 - 150 unit/L Final    Alanine Aminotransferase 06/24/2023 37  0 - 55 unit/L Final    Aspartate Aminotransferase 06/24/2023 25  5 - 34 unit/L Final    eGFR 06/24/2023 >60  mls/min/1.73/m2 Final    WBC 06/24/2023 7.40  4.50 - 11.50 x10(3)/mcL Final    RBC 06/24/2023 4.98  4.70 - 6.10 x10(6)/mcL Final    Hgb 06/24/2023 15.6  14.0 - 18.0 g/dL Final    Hct 06/24/2023 45.6  42.0 - 52.0 % Final    MCV 06/24/2023 91.6  80.0 - 94.0 fL Final    MCH 06/24/2023 31.3 (H)  27.0 - 31.0 pg Final    MCHC 06/24/2023 34.2  33.0 - 36.0 g/dL Final    RDW 06/24/2023 13.1  11.5 - 17.0 % Final    Platelet 06/24/2023 221  130 - 400 x10(3)/mcL Final    MPV 06/24/2023 11.7 (H)  7.4 - 10.4 fL Final    Neut % 06/24/2023 71.8  % Final    Lymph % 06/24/2023 18.4  %  Final    Mono % 06/24/2023 7.4  % Final    Eos % 06/24/2023 1.2  % Final    Basophil % 06/24/2023 0.8  % Final    Lymph # 06/24/2023 1.36  0.6 - 4.6 x10(3)/mcL Final    Neut # 06/24/2023 5.31  2.1 - 9.2 x10(3)/mcL Final    Mono # 06/24/2023 0.55  0.1 - 1.3 x10(3)/mcL Final    Eos # 06/24/2023 0.09  0 - 0.9 x10(3)/mcL Final    Baso # 06/24/2023 0.06  <=0.2 x10(3)/mcL Final    IG# 06/24/2023 0.03  0 - 0.04 x10(3)/mcL Final    IG% 06/24/2023 0.4  % Final    NRBC% 06/24/2023 0.0  % Final   Lab Visit on 06/06/2023   Component Date Value Ref Range Status    Hemoglobin A1c 06/06/2023 5.8  <=7.0 % Final    Estimated Average Glucose 06/06/2023 119.8  mg/dL Final    Thyroid Stimulating Hormone 06/06/2023 1.917  0.350 - 4.940 uIU/mL Final    Prostate Specific Antigen 06/06/2023 2.77  <=4.00 ng/mL Final    Cholesterol Total 06/06/2023 166  <=200 mg/dL Final    HDL Cholesterol 06/06/2023 31 (L)  35 - 60 mg/dL Final    Triglyceride 06/06/2023 135  34 - 140 mg/dL Final    Cholesterol/HDL Ratio 06/06/2023 5  0 - 5 Final    Very Low Density Lipoprotein 06/06/2023 27   Final    LDL Cholesterol 06/06/2023 108.00  50.00 - 140.00 mg/dL Final    Sodium Level 06/06/2023 138  136 - 145 mmol/L Final    Potassium Level 06/06/2023 4.5  3.5 - 5.1 mmol/L Final    Chloride 06/06/2023 104  98 - 107 mmol/L Final    Carbon Dioxide 06/06/2023 28  23 - 31 mmol/L Final    Glucose Level 06/06/2023 104  82 - 115 mg/dL Final    Blood Urea Nitrogen 06/06/2023 19.8  8.4 - 25.7 mg/dL Final    Creatinine 06/06/2023 0.83  0.73 - 1.18 mg/dL Final    Calcium Level Total 06/06/2023 9.9  8.8 - 10.0 mg/dL Final    Protein Total 06/06/2023 6.7  5.8 - 7.6 gm/dL Final    Albumin Level 06/06/2023 3.8  3.4 - 4.8 g/dL Final    Globulin 06/06/2023 2.9  2.4 - 3.5 gm/dL Final    Albumin/Globulin Ratio 06/06/2023 1.3  1.1 - 2.0 ratio Final    Bilirubin Total 06/06/2023 0.5  <=1.5 mg/dL Final    Alkaline Phosphatase 06/06/2023 72  40 - 150 unit/L Final    Alanine  Aminotransferase 06/06/2023 33  0 - 55 unit/L Final    Aspartate Aminotransferase 06/06/2023 24  5 - 34 unit/L Final    eGFR 06/06/2023 >60  mls/min/1.73/m2 Final    WBC 06/06/2023 8.06  4.50 - 11.50 x10(3)/mcL Final    RBC 06/06/2023 4.66 (L)  4.70 - 6.10 x10(6)/mcL Final    Hgb 06/06/2023 14.7  14.0 - 18.0 g/dL Final    Hct 06/06/2023 42.6  42.0 - 52.0 % Final    MCV 06/06/2023 91.4  80.0 - 94.0 fL Final    MCH 06/06/2023 31.5 (H)  27.0 - 31.0 pg Final    MCHC 06/06/2023 34.5  33.0 - 36.0 g/dL Final    RDW 06/06/2023 13.5  11.5 - 17.0 % Final    Platelet 06/06/2023 198  130 - 400 x10(3)/mcL Final    MPV 06/06/2023 11.9 (H)  7.4 - 10.4 fL Final    Neut % 06/06/2023 63.3  % Final    Lymph % 06/06/2023 24.4  % Final    Mono % 06/06/2023 8.1  % Final    Eos % 06/06/2023 2.9  % Final    Basophil % 06/06/2023 0.9  % Final    Lymph # 06/06/2023 1.97  0.6 - 4.6 x10(3)/mcL Final    Neut # 06/06/2023 5.11  2.1 - 9.2 x10(3)/mcL Final    Mono # 06/06/2023 0.65  0.1 - 1.3 x10(3)/mcL Final    Eos # 06/06/2023 0.23  0 - 0.9 x10(3)/mcL Final    Baso # 06/06/2023 0.07  <=0.2 x10(3)/mcL Final    IG# 06/06/2023 0.03  0 - 0.04 x10(3)/mcL Final    IG% 06/06/2023 0.4  % Final    NRBC% 06/06/2023 0.0  % Final    Color, UA 06/06/2023 Light-Yellow  Yellow, Light-Yellow, Dark Yellow, Desi, Straw Final    Appearance, UA 06/06/2023 Clear  Clear Final    Specific Gravity, UA 06/06/2023 1.018   Final    pH, UA 06/06/2023 6.0  5.0 - 8.5 Final    Protein, UA 06/06/2023 Negative  Negative mg/dL Final    Glucose, UA 06/06/2023 Normal  Negative, Normal mg/dL Final    Ketones, UA 06/06/2023 Negative  Negative mg/dL Final    Blood, UA 06/06/2023 Negative  Negative unit/L Final    Bilirubin, UA 06/06/2023 Negative  Negative mg/dL Final    Urobilinogen, UA 06/06/2023 Normal  0.2, 1.0, Normal mg/dL Final    Nitrites, UA 06/06/2023 Negative  Negative Final    Leukocyte Esterase, UA 06/06/2023 Negative  Negative unit/L Final    WBC, UA 06/06/2023 0-5   None Seen, 0-2, 3-5, 0-5 /HPF Final    Bacteria, UA 06/06/2023 None Seen  None Seen /HPF Final    Squamous Epithelial Cells, UA 06/06/2023 None Seen  None Seen /HPF Final    Mucous, UA 06/06/2023 Trace (A)  None Seen /LPF Final    Hyaline Casts, UA 06/06/2023 None Seen  None Seen /lpf Final    RBC, UA 06/06/2023 0-5  None Seen, 0-2, 3-5, 0-5 /HPF Final           Assessment:     Plan  Preoperative Clearance   Cauda Equina Syndrome   Spinal Cord Compression   -Scheduled for urgent laminectomy on 7/31/23 per Dr. Clark in [place], [place]   -Instructed patient to stop ASA 81mg today, patient has been taking up until this AM  -Instructed patient to avoid using tadalafil and OTC NSAIDs until after surgery to avoid isaiah-operative complications   -PCP to fill pain medications following surgery     HTN   HLD   -Ordered EKG in clinic for further evaluation which was significant for right BBB, previous studies not available for comparison   -Elevated BP in clinic today as well as at last clinic visit with PCP, PCP to optimize antihypertensive regimen at next clinic appointment     Prediabetes   -Instructed to complete lab-work previously ordered at last clinic visit     Obesity   -Advised increased risk associated with Obesity and surgical intervention in general         Patient voiced understanding of pre-operative instructions. Patient is medically optimized for planned surgical procedure at this time. Og Risk Score: 0.6%. Risk Stratification: Low risk for intermediate risk procedure.      Patient case and plan of care discussed with Dr. Darion Carter.         Disposition: RTC for previously scheduled appointment with PCP on 10/3/23.     Chris Olson MD   Internal Medicine - -1

## 2023-07-28 NOTE — PROGRESS NOTES
[facility]  CLINIC NOTE    Patient Name: Corey Montano  YOB: 1955  Chief Complaint: No chief complaint on file.     PRESENTING HISTORY   History of Present Illness:  Mr. Corey Montano is a 67 y.o. male w/ PMHx of HTN and HLD presents to pre-op clinic today for evaluation     Review of Systems:  ROS      PAST HISTORY:     No past medical history on file.     Past Surgical History:   Procedure Laterality Date    ARTHROSCOPY OF KNEE  1974    COLONOSCOPY  07/16/2021    UMBILICAL HERNIA REPAIR         Family History   Problem Relation Age of Onset    Dementia Mother     Rheum arthritis Mother     Heart disease Father     Hypertension Father        Social History     Socioeconomic History    Marital status:      Spouse name: Barbara    Number of children: 2   Tobacco Use    Smoking status: Never    Smokeless tobacco: Never   Substance and Sexual Activity    Alcohol use: Not Currently    Drug use: Not Currently     Types: Marijuana     Comment: Gummies    Sexual activity: Not Currently     Partners: Female     Social Determinants of Health     Financial Resource Strain: Low Risk     Difficulty of Paying Living Expenses: Not very hard   Food Insecurity: No Food Insecurity    Worried About Running Out of Food in the Last Year: Never true    Ran Out of Food in the Last Year: Never true   Transportation Needs: No Transportation Needs    Lack of Transportation (Medical): No    Lack of Transportation (Non-Medical): No   Physical Activity: Insufficiently Active    Days of Exercise per Week: 2 days    Minutes of Exercise per Session: 30 min   Stress: Stress Concern Present    Feeling of Stress : Very much   Social Connections: Moderately Isolated    Frequency of Communication with Friends and Family: More than three times a week    Frequency of Social Gatherings with Friends and Family: More than three times a week    Attends Congregation Services: More than 4 times per year    Active  Member of Clubs or Organizations: No    Attends Club or Organization Meetings: Never    Marital Status:    Housing Stability: Low Risk     Unable to Pay for Housing in the Last Year: No    Number of Places Lived in the Last Year: 1    Unstable Housing in the Last Year: No       MEDICATIONS:     Current Outpatient Medications   Medication Instructions    atorvastatin (LIPITOR) 10 mg, Oral, Nightly    diclofenac (VOLTAREN) 75 mg, Oral, 2 times daily PRN    gabapentin (NEURONTIN) 300 mg, Oral, 3 times daily    hydroCHLOROthiazide (HYDRODIURIL) 25 mg, Oral, Daily    lisinopriL (PRINIVIL,ZESTRIL) 40 mg, Oral, Daily    metoprolol succinate (TOPROL-XL) 50 mg, Oral, Daily    tadalafiL (CIALIS) 10 mg, Oral, Daily PRN    traZODone (DESYREL) 25-50 mg, Oral, Nightly        OBJECTIVE:   Vital Signs:  There were no vitals filed for this visit.     Physical Exam:  Physical Exam    Laboratory  Lab Results   Component Value Date     (L) 06/24/2023    K 4.2 06/24/2023    CO2 22 (L) 06/24/2023    BUN 13.9 06/24/2023    CREATININE 0.88 06/24/2023    CALCIUM 9.4 06/24/2023    ALKPHOS 90 06/24/2023    AST 25 06/24/2023    ALT 37 06/24/2023        Lab Results   Component Value Date    WBC 7.40 06/24/2023    RBC 4.98 06/24/2023    HGB 15.6 06/24/2023    HCT 45.6 06/24/2023    MCV 91.6 06/24/2023    MCH 31.3 (H) 06/24/2023    MCHC 34.2 06/24/2023    RDW 13.1 06/24/2023     06/24/2023    MPV 11.7 (H) 06/24/2023        Diagnostic Results:  MRI Cervical Spine Without Contrast    Result Date: 7/26/2023  EXAMINATION:  MRI CERVICAL SPINE WITHOUT CONTRAST    CLINICAL HISTORY:  Spinal stenosis, cervical;Spinal stenosis, lumbar region without neurogenic claudication    TECHNIQUE:  Multiple MRI pulse sequences were performed of the cervical spine without contrast.    COMPARISON:  Cervical spine radiographs July 18, 2023    FINDINGS:  There is loss of normal cervical lordosis with some straightening.  There is trace of grade 1  anterolisthesis of C3 on C4.  Otherwise, cervical vertebrae stature and alignment is preserved.  Small anterior degenerative hypertrophic spurrings from the inferior endplates of C5, C6 and C7.  There is no prevertebral soft tissue prominence.  There are multiple indentations upon the thecal sac with disc pathology and spondylosis causing cord compression which is most evident at the level of C4-C5.  At C4-C5, there is artifact versus mild cord edema with T2 and STIR images hyperintensity.  Disc segmental analysis is given below:    At C2-C3, there is central effacement of the ventral subarachnoid space by disc bulge and there is slight posterolateral impression upon the thecal sac by facet arthropathy.  Cord is not compressed.  Right neural foramen is patent.  Marked narrowing of the left neural foramen is caused by uncovertebral and prominent facet arthropathy.    At C3-C4, there is centrally extruded disc which completely obliterates the ventral subarachnoid space.  Cord is not compressed.  There is marked narrowing of the right neural foramen and moderate narrowing of the left neural foramen caused by uncovertebral and facet arthropathy.    At C4-C5, there is broad disc herniation with more pronounced central portion which compresses the ventral cord.  There is global effacement of the subarachnoid space also caused by facet arthropathy.  Bilateral effacement of the lateral recesses with compression upon the exiting nerve roots.  There are bilateral moderate spondylotic narrowings of the neural foramen.    At C5-C6, there is right paracentral extruded disc which indents the ventral thecal sac without cord compression.  There is also right posterolateral effacement of the subarachnoid space by facet arthropathy.  There is compression upon the right exiting nerve root and moderate narrowing of the right neural foramen.  The left neural foramen is mildly narrowed.    At C6-C7, there is generalized disc bulge which  indents the ventral thecal sac without cord compression.  Bilateral neural foramen are encroached by uncovertebral and facet arthropathy resulting in severe narrowings.    At C7-T1, disc height is preserved.  Central canal is not stenosed.  There are no narrowings of the neural foramen        X-Ray Lumbar Spine Ap Lateral w/Flex Ext    Result Date: 7/18/2023  EXAMINATION:  XR LUMBAR SPINE AP AND LAT WITH FLEX/EXT    CLINICAL HISTORY:  Dorsalgia, unspecified    TECHNIQUE:  AP and lateral views as well as lateral flexion and extension images are performed through the lumbar spine.    COMPARISON:  None    FINDINGS:  Intervertebral disc space narrowing of all lumbar vertebral levels.  Spinal alignment is maintained with no evidence of subluxation on flexion or extension views.  Vertebral body heights are maintained.  Multilevel facet arthropathy with no evidence of acute fracture or dislocation.  Postsurgical changes of the pelvis.        X-Ray Cervical Spine AP Lat with Flexion  Extension    Result Date: 7/18/2023  EXAMINATION:  XR CERVICAL SPINE AP LAT WITH FLEX EXTEN    CLINICAL HISTORY:  Spinal stenosis, lumbar region without neurogenic claudication    TECHNIQUE:  Three views of the cervical spine plus flexion and extension views were performed.    COMPARISON:  None    FINDINGS:  C5 through C7 is partially obscured by overlying soft tissue.    Grade 1 anterolisthesis on flexion view of C2 on C3.  Spinal alignment is otherwise maintained.  Odontoid is intact.  Intervertebral disc space narrowing of C5-6.  Remaining intervertebral disc spaces are maintained.  No evidence of acute fracture or dislocation.  No soft tissue abnormality.         No results found in the last 24 hours.     ASSESSMENT & PLAN:     Patient is a 68 y/o male with a history of HTN and HLD presenting to pre-op clinic for evaluation for     -HTN:   -Currently managed with HCTZ 25mg qd and Lisinopril 40mg qd  -BP today  -Patient currently monitors BP  at home and states    -HLD:  -Currently managed with Lipitor 10mg qd  -Lipid panel conducted on 6/6 displayed HDL of 31 but unremarkable total cholesterol, LDL, and TG.         -Patient reports no complications with previous surgical procedures with last occurring       Risk Assessment:   -Patient reports no complications with previous surgical procedures with last occurring   -BP and HLD well-controlled. No other significant surgical risk factors  -METs   -Low-risk patient who will undergo a Intermediate risk procedure      Rashad Suggs DO, PGY-1  07/28/2023, 7:46 AM

## 2023-07-31 ENCOUNTER — ANESTHESIA (OUTPATIENT)
Dept: SURGERY | Facility: HOSPITAL | Age: 68
DRG: 472 | End: 2023-07-31
Payer: COMMERCIAL

## 2023-07-31 ENCOUNTER — HOSPITAL ENCOUNTER (INPATIENT)
Facility: HOSPITAL | Age: 68
LOS: 2 days | Discharge: HOME HEALTH | DRG: 472 | End: 2023-08-02
Attending: NEUROLOGICAL SURGERY | Admitting: NEUROLOGICAL SURGERY
Payer: COMMERCIAL

## 2023-07-31 DIAGNOSIS — M54.9 DORSALGIA: ICD-10-CM

## 2023-07-31 DIAGNOSIS — G95.9 CERVICAL MYELOPATHY: Primary | ICD-10-CM

## 2023-07-31 DIAGNOSIS — G83.4 CAUDA EQUINA SYNDROME: ICD-10-CM

## 2023-07-31 LAB
ABO GROUP BLD: NORMAL
APTT PPP: 27.9 SEC (ref 21–32)
BLD GP AB SCN CELLS X3 SERPL QL: NORMAL
BLD PROD TYP BPU: NORMAL
BLOOD UNIT EXPIRATION DATE: NORMAL
BLOOD UNIT TYPE CODE: 6200
BLOOD UNIT TYPE: NORMAL
CODING SYSTEM: NORMAL
CROSSMATCH INTERPRETATION: NORMAL
DISPENSE STATUS: NORMAL
INR PPP: 0.9 (ref 0.8–1.2)
PROTHROMBIN TIME: 10.4 SEC (ref 9–12.5)
RH BLD: NORMAL
UNIT NUMBER: NORMAL

## 2023-07-31 PROCEDURE — D9220A PRA ANESTHESIA: Mod: ANES,,, | Performed by: STUDENT IN AN ORGANIZED HEALTH CARE EDUCATION/TRAINING PROGRAM

## 2023-07-31 PROCEDURE — 63600175 PHARM REV CODE 636 W HCPCS: Performed by: NURSE ANESTHETIST, CERTIFIED REGISTERED

## 2023-07-31 PROCEDURE — D9220A PRA ANESTHESIA: ICD-10-PCS | Mod: ANES,,, | Performed by: STUDENT IN AN ORGANIZED HEALTH CARE EDUCATION/TRAINING PROGRAM

## 2023-07-31 PROCEDURE — 22614 PR ARTHRODESIS, POST/POSTLAT, SNGL INTERSPACE, EA ADDTL: ICD-10-PCS | Mod: ,,, | Performed by: NEUROLOGICAL SURGERY

## 2023-07-31 PROCEDURE — 27800903 OPTIME MED/SURG SUP & DEVICES OTHER IMPLANTS: Performed by: NEUROLOGICAL SURGERY

## 2023-07-31 PROCEDURE — 22600 ARTHRD PST TQ 1NTRSPC CRV: CPT | Mod: 51,,, | Performed by: NEUROLOGICAL SURGERY

## 2023-07-31 PROCEDURE — 85610 PROTHROMBIN TIME: CPT | Performed by: NEUROLOGICAL SURGERY

## 2023-07-31 PROCEDURE — 63600175 PHARM REV CODE 636 W HCPCS: Performed by: PHYSICIAN ASSISTANT

## 2023-07-31 PROCEDURE — 37000008 HC ANESTHESIA 1ST 15 MINUTES: Performed by: NEUROLOGICAL SURGERY

## 2023-07-31 PROCEDURE — 22614 ARTHRD PST TQ 1NTRSPC EA ADD: CPT | Mod: ,,, | Performed by: NEUROLOGICAL SURGERY

## 2023-07-31 PROCEDURE — 71000039 HC RECOVERY, EACH ADD'L HOUR: Performed by: NEUROLOGICAL SURGERY

## 2023-07-31 PROCEDURE — D9220A PRA ANESTHESIA: Mod: CRNA,,, | Performed by: NURSE ANESTHETIST, CERTIFIED REGISTERED

## 2023-07-31 PROCEDURE — 25000003 PHARM REV CODE 250: Performed by: NURSE ANESTHETIST, CERTIFIED REGISTERED

## 2023-07-31 PROCEDURE — 20930 SP BONE ALGRFT MORSEL ADD-ON: CPT | Mod: ,,, | Performed by: NEUROLOGICAL SURGERY

## 2023-07-31 PROCEDURE — 25000003 PHARM REV CODE 250: Performed by: NEUROLOGICAL SURGERY

## 2023-07-31 PROCEDURE — 20936 PR AUTOGRAFT SPINE SURGERY LOCAL FROM SAME INCISION: ICD-10-PCS | Mod: ,,, | Performed by: NEUROLOGICAL SURGERY

## 2023-07-31 PROCEDURE — 94761 N-INVAS EAR/PLS OXIMETRY MLT: CPT

## 2023-07-31 PROCEDURE — 36000711: Performed by: NEUROLOGICAL SURGERY

## 2023-07-31 PROCEDURE — 63015 REMOVE SPINE LAMINA >2 CRVCL: CPT | Mod: ,,, | Performed by: NEUROLOGICAL SURGERY

## 2023-07-31 PROCEDURE — D9220A PRA ANESTHESIA: ICD-10-PCS | Mod: CRNA,,, | Performed by: NURSE ANESTHETIST, CERTIFIED REGISTERED

## 2023-07-31 PROCEDURE — P9035 PLATELET PHERES LEUKOREDUCED: HCPCS | Performed by: NEUROLOGICAL SURGERY

## 2023-07-31 PROCEDURE — 11000001 HC ACUTE MED/SURG PRIVATE ROOM

## 2023-07-31 PROCEDURE — 86850 RBC ANTIBODY SCREEN: CPT | Performed by: NEUROLOGICAL SURGERY

## 2023-07-31 PROCEDURE — 36000710: Performed by: NEUROLOGICAL SURGERY

## 2023-07-31 PROCEDURE — 63600175 PHARM REV CODE 636 W HCPCS: Performed by: STUDENT IN AN ORGANIZED HEALTH CARE EDUCATION/TRAINING PROGRAM

## 2023-07-31 PROCEDURE — 22842 INSERT SPINE FIXATION DEVICE: CPT | Mod: ,,, | Performed by: NEUROLOGICAL SURGERY

## 2023-07-31 PROCEDURE — 94799 UNLISTED PULMONARY SVC/PX: CPT

## 2023-07-31 PROCEDURE — 85730 THROMBOPLASTIN TIME PARTIAL: CPT | Performed by: NEUROLOGICAL SURGERY

## 2023-07-31 PROCEDURE — 63015 PR LAMINECTOMY,>2 SGMT,CERVICAL: ICD-10-PCS | Mod: ,,, | Performed by: NEUROLOGICAL SURGERY

## 2023-07-31 PROCEDURE — 25000003 PHARM REV CODE 250: Performed by: PHYSICIAN ASSISTANT

## 2023-07-31 PROCEDURE — 22600 PR ARTHRODESIS, POST/POSTLAT, SNGL INTERSPACE, CERVICAL BELOW C2: ICD-10-PCS | Mod: 51,,, | Performed by: NEUROLOGICAL SURGERY

## 2023-07-31 PROCEDURE — 71000033 HC RECOVERY, INTIAL HOUR: Performed by: NEUROLOGICAL SURGERY

## 2023-07-31 PROCEDURE — 63600175 PHARM REV CODE 636 W HCPCS: Performed by: NEUROLOGICAL SURGERY

## 2023-07-31 PROCEDURE — 37000009 HC ANESTHESIA EA ADD 15 MINS: Performed by: NEUROLOGICAL SURGERY

## 2023-07-31 PROCEDURE — 20936 SP BONE AGRFT LOCAL ADD-ON: CPT | Mod: ,,, | Performed by: NEUROLOGICAL SURGERY

## 2023-07-31 PROCEDURE — 27201423 OPTIME MED/SURG SUP & DEVICES STERILE SUPPLY: Performed by: NEUROLOGICAL SURGERY

## 2023-07-31 PROCEDURE — 20930 PR ALLOGRAFT FOR SPINE SURGERY ONLY MORSELIZED: ICD-10-PCS | Mod: ,,, | Performed by: NEUROLOGICAL SURGERY

## 2023-07-31 PROCEDURE — 22842 PR POSTERIOR SEGMENTAL INSTRUMENTATION 3-6 VRT SEG: ICD-10-PCS | Mod: ,,, | Performed by: NEUROLOGICAL SURGERY

## 2023-07-31 PROCEDURE — 86900 BLOOD TYPING SEROLOGIC ABO: CPT | Performed by: NEUROLOGICAL SURGERY

## 2023-07-31 PROCEDURE — 36415 COLL VENOUS BLD VENIPUNCTURE: CPT | Performed by: NEUROLOGICAL SURGERY

## 2023-07-31 PROCEDURE — C1713 ANCHOR/SCREW BN/BN,TIS/BN: HCPCS | Performed by: NEUROLOGICAL SURGERY

## 2023-07-31 DEVICE — GRAFT PRIME DBM HD BONE 10CC: Type: IMPLANTABLE DEVICE | Site: NECK | Status: FUNCTIONAL

## 2023-07-31 DEVICE — SCREW SYMPHONY PA 3.5X14MM: Type: IMPLANTABLE DEVICE | Site: NECK | Status: FUNCTIONAL

## 2023-07-31 DEVICE — IMPLANTABLE DEVICE: Type: IMPLANTABLE DEVICE | Site: NECK | Status: FUNCTIONAL

## 2023-07-31 DEVICE — SET SYMPHONY SCREW NS: Type: IMPLANTABLE DEVICE | Site: NECK | Status: FUNCTIONAL

## 2023-07-31 RX ORDER — MUPIROCIN 20 MG/G
OINTMENT TOPICAL
Status: DISCONTINUED | OUTPATIENT
Start: 2023-07-31 | End: 2023-07-31

## 2023-07-31 RX ORDER — KETAMINE HCL IN 0.9 % NACL 50 MG/5 ML
SYRINGE (ML) INTRAVENOUS
Status: DISCONTINUED | OUTPATIENT
Start: 2023-07-31 | End: 2023-07-31

## 2023-07-31 RX ORDER — MUPIROCIN 20 MG/G
1 OINTMENT TOPICAL 2 TIMES DAILY
Status: DISCONTINUED | OUTPATIENT
Start: 2023-07-31 | End: 2023-07-31

## 2023-07-31 RX ORDER — HYDROMORPHONE HYDROCHLORIDE 2 MG/ML
0.5 INJECTION, SOLUTION INTRAMUSCULAR; INTRAVENOUS; SUBCUTANEOUS EVERY 5 MIN PRN
Status: DISCONTINUED | OUTPATIENT
Start: 2023-07-31 | End: 2023-07-31 | Stop reason: HOSPADM

## 2023-07-31 RX ORDER — PHENYLEPHRINE HYDROCHLORIDE 10 MG/ML
INJECTION INTRAVENOUS
Status: DISCONTINUED | OUTPATIENT
Start: 2023-07-31 | End: 2023-07-31

## 2023-07-31 RX ORDER — AMOXICILLIN 250 MG
2 CAPSULE ORAL NIGHTLY PRN
Status: DISCONTINUED | OUTPATIENT
Start: 2023-07-31 | End: 2023-08-02 | Stop reason: HOSPADM

## 2023-07-31 RX ORDER — MUPIROCIN 20 MG/G
OINTMENT TOPICAL 2 TIMES DAILY
Status: DISCONTINUED | OUTPATIENT
Start: 2023-07-31 | End: 2023-08-02 | Stop reason: HOSPADM

## 2023-07-31 RX ORDER — SODIUM CHLORIDE 9 MG/ML
INJECTION, SOLUTION INTRAVENOUS CONTINUOUS
Status: DISCONTINUED | OUTPATIENT
Start: 2023-07-31 | End: 2023-08-01

## 2023-07-31 RX ORDER — PROPOFOL 10 MG/ML
VIAL (ML) INTRAVENOUS CONTINUOUS PRN
Status: DISCONTINUED | OUTPATIENT
Start: 2023-07-31 | End: 2023-07-31

## 2023-07-31 RX ORDER — SUCCINYLCHOLINE CHLORIDE 20 MG/ML
INJECTION INTRAMUSCULAR; INTRAVENOUS
Status: DISCONTINUED | OUTPATIENT
Start: 2023-07-31 | End: 2023-07-31

## 2023-07-31 RX ORDER — BUPIVACAINE HYDROCHLORIDE 5 MG/ML
INJECTION, SOLUTION EPIDURAL; INTRACAUDAL
Status: DISCONTINUED | OUTPATIENT
Start: 2023-07-31 | End: 2023-07-31 | Stop reason: HOSPADM

## 2023-07-31 RX ORDER — BISACODYL 10 MG
10 SUPPOSITORY, RECTAL RECTAL DAILY PRN
Status: DISCONTINUED | OUTPATIENT
Start: 2023-07-31 | End: 2023-08-02 | Stop reason: HOSPADM

## 2023-07-31 RX ORDER — VANCOMYCIN HYDROCHLORIDE 1 G/20ML
INJECTION, POWDER, LYOPHILIZED, FOR SOLUTION INTRAVENOUS
Status: DISCONTINUED | OUTPATIENT
Start: 2023-07-31 | End: 2023-07-31 | Stop reason: HOSPADM

## 2023-07-31 RX ORDER — FENTANYL CITRATE 50 UG/ML
INJECTION, SOLUTION INTRAMUSCULAR; INTRAVENOUS
Status: DISCONTINUED | OUTPATIENT
Start: 2023-07-31 | End: 2023-07-31

## 2023-07-31 RX ORDER — MAG HYDROX/ALUMINUM HYD/SIMETH 200-200-20
30 SUSPENSION, ORAL (FINAL DOSE FORM) ORAL EVERY 4 HOURS PRN
Status: DISCONTINUED | OUTPATIENT
Start: 2023-07-31 | End: 2023-08-02 | Stop reason: HOSPADM

## 2023-07-31 RX ORDER — CEFAZOLIN SODIUM 2 G/50ML
2 SOLUTION INTRAVENOUS
Status: COMPLETED | OUTPATIENT
Start: 2023-07-31 | End: 2023-07-31

## 2023-07-31 RX ORDER — VASOPRESSIN 20 [USP'U]/ML
INJECTION, SOLUTION INTRAMUSCULAR; SUBCUTANEOUS
Status: DISCONTINUED | OUTPATIENT
Start: 2023-07-31 | End: 2023-07-31

## 2023-07-31 RX ORDER — HYDROCODONE BITARTRATE AND ACETAMINOPHEN 500; 5 MG/1; MG/1
TABLET ORAL
Status: DISCONTINUED | OUTPATIENT
Start: 2023-07-31 | End: 2023-08-02 | Stop reason: HOSPADM

## 2023-07-31 RX ORDER — MIDAZOLAM HYDROCHLORIDE 1 MG/ML
INJECTION INTRAMUSCULAR; INTRAVENOUS
Status: DISCONTINUED | OUTPATIENT
Start: 2023-07-31 | End: 2023-07-31

## 2023-07-31 RX ORDER — LIDOCAINE HYDROCHLORIDE 20 MG/ML
INJECTION INTRAVENOUS
Status: DISCONTINUED | OUTPATIENT
Start: 2023-07-31 | End: 2023-07-31

## 2023-07-31 RX ORDER — ACETAMINOPHEN 325 MG/1
650 TABLET ORAL EVERY 6 HOURS PRN
Status: DISCONTINUED | OUTPATIENT
Start: 2023-07-31 | End: 2023-08-02 | Stop reason: HOSPADM

## 2023-07-31 RX ORDER — ONDANSETRON 2 MG/ML
INJECTION INTRAMUSCULAR; INTRAVENOUS
Status: DISCONTINUED | OUTPATIENT
Start: 2023-07-31 | End: 2023-07-31

## 2023-07-31 RX ORDER — ONDANSETRON 2 MG/ML
8 INJECTION INTRAMUSCULAR; INTRAVENOUS EVERY 6 HOURS PRN
Status: DISCONTINUED | OUTPATIENT
Start: 2023-07-31 | End: 2023-08-02 | Stop reason: HOSPADM

## 2023-07-31 RX ORDER — HYDROMORPHONE HYDROCHLORIDE 2 MG/ML
1 INJECTION, SOLUTION INTRAMUSCULAR; INTRAVENOUS; SUBCUTANEOUS
Status: DISCONTINUED | OUTPATIENT
Start: 2023-07-31 | End: 2023-08-02 | Stop reason: HOSPADM

## 2023-07-31 RX ORDER — HYDROMORPHONE HYDROCHLORIDE 2 MG/ML
2 INJECTION, SOLUTION INTRAMUSCULAR; INTRAVENOUS; SUBCUTANEOUS
Status: DISCONTINUED | OUTPATIENT
Start: 2023-07-31 | End: 2023-08-02 | Stop reason: HOSPADM

## 2023-07-31 RX ORDER — ROCURONIUM BROMIDE 10 MG/ML
INJECTION, SOLUTION INTRAVENOUS
Status: DISCONTINUED | OUTPATIENT
Start: 2023-07-31 | End: 2023-07-31

## 2023-07-31 RX ORDER — DEXAMETHASONE SODIUM PHOSPHATE 4 MG/ML
INJECTION, SOLUTION INTRA-ARTICULAR; INTRALESIONAL; INTRAMUSCULAR; INTRAVENOUS; SOFT TISSUE
Status: DISCONTINUED | OUTPATIENT
Start: 2023-07-31 | End: 2023-07-31

## 2023-07-31 RX ORDER — PROCHLORPERAZINE EDISYLATE 5 MG/ML
5 INJECTION INTRAMUSCULAR; INTRAVENOUS EVERY 6 HOURS PRN
Status: DISCONTINUED | OUTPATIENT
Start: 2023-07-31 | End: 2023-08-02 | Stop reason: HOSPADM

## 2023-07-31 RX ORDER — OXYCODONE AND ACETAMINOPHEN 10; 325 MG/1; MG/1
1 TABLET ORAL EVERY 4 HOURS PRN
Status: DISCONTINUED | OUTPATIENT
Start: 2023-07-31 | End: 2023-08-02 | Stop reason: HOSPADM

## 2023-07-31 RX ORDER — PROPOFOL 10 MG/ML
VIAL (ML) INTRAVENOUS
Status: DISCONTINUED | OUTPATIENT
Start: 2023-07-31 | End: 2023-07-31

## 2023-07-31 RX ORDER — OXYCODONE AND ACETAMINOPHEN 5; 325 MG/1; MG/1
1 TABLET ORAL EVERY 4 HOURS PRN
Status: DISCONTINUED | OUTPATIENT
Start: 2023-07-31 | End: 2023-08-02 | Stop reason: HOSPADM

## 2023-07-31 RX ORDER — SODIUM CHLORIDE, SODIUM LACTATE, POTASSIUM CHLORIDE, CALCIUM CHLORIDE 600; 310; 30; 20 MG/100ML; MG/100ML; MG/100ML; MG/100ML
INJECTION, SOLUTION INTRAVENOUS CONTINUOUS PRN
Status: DISCONTINUED | OUTPATIENT
Start: 2023-07-31 | End: 2023-07-31

## 2023-07-31 RX ORDER — METHOCARBAMOL 750 MG/1
750 TABLET, FILM COATED ORAL EVERY 8 HOURS PRN
Status: DISCONTINUED | OUTPATIENT
Start: 2023-07-31 | End: 2023-08-02 | Stop reason: HOSPADM

## 2023-07-31 RX ADMIN — SODIUM CHLORIDE 0.2 MCG/KG/MIN: 9 INJECTION, SOLUTION INTRAVENOUS at 10:07

## 2023-07-31 RX ADMIN — SODIUM CHLORIDE: 0.9 INJECTION, SOLUTION INTRAVENOUS at 10:07

## 2023-07-31 RX ADMIN — SUCCINYLCHOLINE CHLORIDE 160 MG: 20 INJECTION, SOLUTION INTRAMUSCULAR; INTRAVENOUS at 09:07

## 2023-07-31 RX ADMIN — FENTANYL CITRATE 25 MCG: 0.05 INJECTION, SOLUTION INTRAMUSCULAR; INTRAVENOUS at 12:07

## 2023-07-31 RX ADMIN — VASOPRESSIN 2 UNITS: 20 INJECTION, SOLUTION INTRAMUSCULAR; SUBCUTANEOUS at 10:07

## 2023-07-31 RX ADMIN — FENTANYL CITRATE 25 MCG: 0.05 INJECTION, SOLUTION INTRAMUSCULAR; INTRAVENOUS at 01:07

## 2023-07-31 RX ADMIN — Medication 25 MG: at 09:07

## 2023-07-31 RX ADMIN — Medication 10 MG: at 11:07

## 2023-07-31 RX ADMIN — PHENYLEPHRINE HYDROCHLORIDE 200 MCG: 10 INJECTION INTRAVENOUS at 10:07

## 2023-07-31 RX ADMIN — FENTANYL CITRATE 50 MCG: 0.05 INJECTION, SOLUTION INTRAMUSCULAR; INTRAVENOUS at 10:07

## 2023-07-31 RX ADMIN — SODIUM CHLORIDE: 9 INJECTION, SOLUTION INTRAVENOUS at 04:07

## 2023-07-31 RX ADMIN — OXYCODONE AND ACETAMINOPHEN 1 TABLET: 10; 325 TABLET ORAL at 01:07

## 2023-07-31 RX ADMIN — PROPOFOL 50 MG: 10 INJECTION, EMULSION INTRAVENOUS at 10:07

## 2023-07-31 RX ADMIN — HYDROMORPHONE HYDROCHLORIDE 0.5 MG: 2 INJECTION, SOLUTION INTRAMUSCULAR; INTRAVENOUS; SUBCUTANEOUS at 01:07

## 2023-07-31 RX ADMIN — SODIUM CHLORIDE, SODIUM LACTATE, POTASSIUM CHLORIDE, AND CALCIUM CHLORIDE: .6; .31; .03; .02 INJECTION, SOLUTION INTRAVENOUS at 08:07

## 2023-07-31 RX ADMIN — PROPOFOL 200 MG: 10 INJECTION, EMULSION INTRAVENOUS at 09:07

## 2023-07-31 RX ADMIN — PHENYLEPHRINE HYDROCHLORIDE 200 MCG: 10 INJECTION INTRAVENOUS at 09:07

## 2023-07-31 RX ADMIN — NOREPINEPHRINE BITARTRATE 0.02 MCG/KG/MIN: 1 INJECTION, SOLUTION, CONCENTRATE INTRAVENOUS at 10:07

## 2023-07-31 RX ADMIN — VASOPRESSIN 3 UNITS: 20 INJECTION, SOLUTION INTRAMUSCULAR; SUBCUTANEOUS at 10:07

## 2023-07-31 RX ADMIN — DEXAMETHASONE SODIUM PHOSPHATE 8 MG: 4 INJECTION, SOLUTION INTRA-ARTICULAR; INTRALESIONAL; INTRAMUSCULAR; INTRAVENOUS; SOFT TISSUE at 11:07

## 2023-07-31 RX ADMIN — MIDAZOLAM HYDROCHLORIDE 2 MG: 1 INJECTION, SOLUTION INTRAMUSCULAR; INTRAVENOUS at 09:07

## 2023-07-31 RX ADMIN — PHENYLEPHRINE HYDROCHLORIDE 0.3 MCG/KG/MIN: 10 INJECTION INTRAVENOUS at 10:07

## 2023-07-31 RX ADMIN — CEFAZOLIN SODIUM 3 G: 2 SOLUTION INTRAVENOUS at 09:07

## 2023-07-31 RX ADMIN — OXYCODONE AND ACETAMINOPHEN 1 TABLET: 10; 325 TABLET ORAL at 09:07

## 2023-07-31 RX ADMIN — DEXTROSE MONOHYDRATE 3 G: 50 INJECTION, SOLUTION INTRAVENOUS at 05:07

## 2023-07-31 RX ADMIN — ONDANSETRON 8 MG: 2 INJECTION, SOLUTION INTRAMUSCULAR; INTRAVENOUS at 12:07

## 2023-07-31 RX ADMIN — FENTANYL CITRATE 100 MCG: 0.05 INJECTION, SOLUTION INTRAMUSCULAR; INTRAVENOUS at 09:07

## 2023-07-31 RX ADMIN — PROPOFOL 150 MCG/KG/MIN: 10 INJECTION, EMULSION INTRAVENOUS at 10:07

## 2023-07-31 RX ADMIN — MUPIROCIN: 20 OINTMENT TOPICAL at 09:07

## 2023-07-31 RX ADMIN — LIDOCAINE HYDROCHLORIDE 100 MG: 20 INJECTION, SOLUTION INTRAVENOUS at 09:07

## 2023-07-31 RX ADMIN — ROCURONIUM BROMIDE 10 MG: 10 INJECTION, SOLUTION INTRAVENOUS at 09:07

## 2023-07-31 RX ADMIN — Medication 15 MG: at 12:07

## 2023-07-31 NOTE — ANESTHESIA POSTPROCEDURE EVALUATION
Anesthesia Post Evaluation    Patient: Corey Montano    Procedure(s) Performed: Procedure(s) (LRB):  LAMINECTOMY, SPINE, CERVICAL, WITH FUSION (N/A)    Final Anesthesia Type: general      Patient location during evaluation: PACU  Patient participation: Yes- Able to Participate  Level of consciousness: awake and alert  Post-procedure vital signs: reviewed and stable  Pain management: adequate    PONV status at discharge: No PONV  Anesthetic complications: no      Cardiovascular status: stable  Respiratory status: room air  Hydration status: euvolemic  Follow-up not needed.          Vitals Value Taken Time   /85 07/31/23 1430   Temp 36.5 °C (97.7 °F) 07/31/23 1315   Pulse 86 07/31/23 1430   Resp 19 07/31/23 1430   SpO2 99 % 07/31/23 1430   Vitals shown include unvalidated device data.      No case tracking events are documented in the log.      Pain/Kelli Score: Pain Rating Prior to Med Admin: 10 (7/31/2023  1:45 PM)  Kelli Score: 10 (7/31/2023  2:00 PM)

## 2023-07-31 NOTE — OP NOTE
Date of Procedure: 7/31/2023     Pre-Operative Diagnosis: Cervical myelopathy [G95.9]    Post-Operative Diagnosis: Post-Op Diagnosis Codes:     * Cervical myelopathy [G95.9]    Anesthesia: General    Procedures performed:  C3 through 6 laminectomy with medial facetectomy and posterior fusion.    Surgeon: Steven Clark MD    Assistant: Carmine Gil MD, resident    Indication for Procedure:  67-year-old man presented to the clinic last week with lumbar MRI and weakness involving all 4 extremities with increased reflexes.  Urgent MRI was obtained which showed severe cervical stenosis with myelopathy changes.  Urgent decompression was recommended from posterior from C3-6.    Operative Note:  After consent was obtained placed in medical record, the patient was taken to the operating room.  General anesthesia was established.  Neuro monitoring was established.  Randolph head clamp was applied and patient was flipped prone onto chest rolls.  All pressure points were padded.  Posterior cervical area was prepped and draped in usual sterile fashion.  We palpated for the midline C2 and C7 prominences, marked these and then marked a midline incision.  We infiltrated with local anesthetic then opened with a 10 blade knife.  We used Bovie to dissect soft tissues down the midline rough a and then got subperiosteal dissection of C3 through C6 lamina and lateral masses.  We placed self-retaining retractors.  We then tapped the lateral masses with a 3.5 mm tap.  We palpated with ball-tip probe and found no violation.  At this point we proceeded with our decompression.  We used Leksell to remove the interspinous and interlaminar ligaments between C2 and 3 as well as C6 and 7.  We used a drill to thin the lamina rostrally at C3 and caudally at C6 down to the epidural space and ligamentum flavum.  We then used a round bur to drill troughs bilaterally disconnect the lamina from the lateral masses.  We sequentially removed the central  lamina and spinous process of all 4 levels and then saved this bone for Milling and autologous bone grafting.  Neuro monitoring was checked after the decompression and motor evoked potentials had improved.  We used Kerrison punch to clean up the edges of the lateral masses and extend the decompression centrally.  We also drilled the medial facets at C3-4, C4-5 and C5-6 bilaterally and perform medial facetectomies.  We then proceeded to place 3.5 x 14 mm lateral mass screws at all 4 levels bilaterally.  We used two 75 mm rods and 8 set screws to reduce the lola into position.  After x-ray confirmed the position of the lateral mass screws, we used a torque  to set the final tension on the lateral mass set screws.  We then performed arthrodesis by decorticating the lateral masses between C3 and 6 and placed viable cell allograft in the facet spaces and then covered this with DBM mixed with milled autologous bone graft harvested locally.  We irrigated with antibiotic solution and tunneled a Hemovac drain out through separate incision.  The drain was left in the subfascial position and the fascia was closed with 2-0 Vicryl.  Soft tissues were closed with 2-0 Vicryl and then 3-0 Vicryl used to close the dermal layer.  A stapler was used to close skin.  We covered with a sterile dressing.  We placed a drain stitch to secure the drain as well.  There were no complications.  Motor-evoked potentials were improved at the end of the case.    EBL: 200 ml

## 2023-07-31 NOTE — TRANSFER OF CARE
"Anesthesia Transfer of Care Note    Patient: Corey Montano    Procedure(s) Performed: Procedure(s) (LRB):  LAMINECTOMY, SPINE, CERVICAL, WITH FUSION (N/A)    Patient location: PACU    Anesthesia Type: general    Transport from OR: Transported from OR on 6-10 L/min O2 by face mask with adequate spontaneous ventilation    Post pain: adequate analgesia    Post assessment: no apparent anesthetic complications and tolerated procedure well    Post vital signs: stable    Level of consciousness: awake    Nausea/Vomiting: no nausea/vomiting    Complications: none    Transfer of care protocol was followed      Last vitals:   Visit Vitals  /82 (BP Location: Left arm, Patient Position: Lying)   Pulse 79   Temp 37.2 °C (99 °F) (Skin)   Resp 16   Ht 6' 2" (1.88 m)   Wt 118.8 kg (262 lb)   SpO2 98%   BMI 33.64 kg/m²     "

## 2023-07-31 NOTE — INTERVAL H&P NOTE
The patient has been examined and the H&P has been reviewed:    I concur with the findings and no changes have occurred since H&P was written.    Surgery risks, benefits and alternative options discussed and understood by patient/family.    We discussed the possibility of no neurologic improvement, pseudarthrosis, possible adjacent level changes, and need to address lumbar issues at a later date.           There are no hospital problems to display for this patient.

## 2023-07-31 NOTE — NURSING
VN cued into room to complete admit assessment. VIP model introduced; VN working alongside bedside treatment team.  Plan of care reviewed with patient and patients son at bedside. Patient informed of fall risk, fall precautions, call light within reach, side rails x2 elevated. Patient notified to ask staff for assistance. Patient verbalized complete understanding. Time allowed for questions. Will continue to monitor and intervene as needed.    He was diagnosed with Covid on 11/27/2020

## 2023-07-31 NOTE — ANESTHESIA PREPROCEDURE EVALUATION
07/31/2023  Corey Montano is a 67 y.o., male.      Pre-op Assessment    I have reviewed the Patient Summary Reports.    I have reviewed the NPO Status.   I have reviewed the Medications.     Review of Systems  Anesthesia Hx:  No problems with previous Anesthesia  History of prior surgery of interest to airway management or planning:  Denies Personal Hx of Anesthesia complications.   Hematology/Oncology:  Hematology Normal   Oncology Normal     Cardiovascular:   Exercise tolerance: good Hypertension    Pulmonary:  Pulmonary Normal    Renal/:  Renal/ Normal     Hepatic/GI:  Hepatic/GI Normal    Musculoskeletal:   Arthritis   Spine Disorders: cervical and lumbar Degenerative disease    Neurological:   Bilateral upper ext numbness. Spontaneously drops things. Not exacerbated by neck movement   Chronic Pain Syndrome   Endocrine:  Endocrine Normal        Physical Exam  General: Well nourished    Airway:  Mallampati: III   Mouth Opening: Normal  TM Distance: Normal  Tongue: Normal  Neck ROM: Normal ROM    Dental:  Edentulous    Chest/Lungs:  Clear to auscultation    Heart:  Rate: Normal        Anesthesia Plan  Type of Anesthesia, risks & benefits discussed:    Anesthesia Type: Gen ETT  Intra-op Monitoring Plan: Standard ASA Monitors and Art Line  Post Op Pain Control Plan: multimodal analgesia  Airway Plan: Video  Informed Consent: Informed consent signed with the Patient and all parties understand the risks and agree with anesthesia plan.  All questions answered.   ASA Score: 3  Day of Surgery Review of History & Physical: H&P Update referred to the surgeon/provider.    Ready For Surgery From Anesthesia Perspective.     .

## 2023-07-31 NOTE — NURSING
Attempted to cue in to complete hospital admission information, patient eating at present and VN will return shortly.

## 2023-07-31 NOTE — PLAN OF CARE
Patient AAOx4, VSS, patien tolerating regular diet, no nausea or vomiting. SCDs in place. Drain care provided. Call bell in reach. Safety maintained.   Problem: Adult Inpatient Plan of Care  Goal: Plan of Care Review  Outcome: Ongoing, Progressing  Goal: Patient-Specific Goal (Individualized)  Outcome: Ongoing, Progressing  Goal: Absence of Hospital-Acquired Illness or Injury  Outcome: Ongoing, Progressing  Goal: Optimal Comfort and Wellbeing  Outcome: Ongoing, Progressing  Goal: Readiness for Transition of Care  Outcome: Ongoing, Progressing     Problem: Infection  Goal: Absence of Infection Signs and Symptoms  Outcome: Ongoing, Progressing     Problem: Fall Injury Risk  Goal: Absence of Fall and Fall-Related Injury  Outcome: Ongoing, Progressing

## 2023-08-01 DIAGNOSIS — G95.9 CERVICAL MYELOPATHY: Primary | ICD-10-CM

## 2023-08-01 DIAGNOSIS — Z98.1 S/P CERVICAL SPINAL FUSION: ICD-10-CM

## 2023-08-01 LAB
ANION GAP SERPL CALC-SCNC: 10 MMOL/L (ref 8–16)
BASOPHILS # BLD AUTO: 0.01 K/UL (ref 0–0.2)
BASOPHILS NFR BLD: 0.1 % (ref 0–1.9)
BUN SERPL-MCNC: 16 MG/DL (ref 8–23)
CALCIUM SERPL-MCNC: 9.5 MG/DL (ref 8.7–10.5)
CHLORIDE SERPL-SCNC: 101 MMOL/L (ref 95–110)
CO2 SERPL-SCNC: 24 MMOL/L (ref 23–29)
CREAT SERPL-MCNC: 0.8 MG/DL (ref 0.5–1.4)
DIFFERENTIAL METHOD: ABNORMAL
EOSINOPHIL # BLD AUTO: 0 K/UL (ref 0–0.5)
EOSINOPHIL NFR BLD: 0 % (ref 0–8)
ERYTHROCYTE [DISTWIDTH] IN BLOOD BY AUTOMATED COUNT: 13.2 % (ref 11.5–14.5)
EST. GFR  (NO RACE VARIABLE): >60 ML/MIN/1.73 M^2
GLUCOSE SERPL-MCNC: 127 MG/DL (ref 70–110)
HCT VFR BLD AUTO: 41.8 % (ref 40–54)
HGB BLD-MCNC: 14.7 G/DL (ref 14–18)
IMM GRANULOCYTES # BLD AUTO: 0.06 K/UL (ref 0–0.04)
IMM GRANULOCYTES NFR BLD AUTO: 0.4 % (ref 0–0.5)
LYMPHOCYTES # BLD AUTO: 1.2 K/UL (ref 1–4.8)
LYMPHOCYTES NFR BLD: 8.3 % (ref 18–48)
MCH RBC QN AUTO: 32.5 PG (ref 27–31)
MCHC RBC AUTO-ENTMCNC: 35.2 G/DL (ref 32–36)
MCV RBC AUTO: 93 FL (ref 82–98)
MONOCYTES # BLD AUTO: 1 K/UL (ref 0.3–1)
MONOCYTES NFR BLD: 6.8 % (ref 4–15)
NEUTROPHILS # BLD AUTO: 12.4 K/UL (ref 1.8–7.7)
NEUTROPHILS NFR BLD: 84.4 % (ref 38–73)
NRBC BLD-RTO: 0 /100 WBC
PLATELET # BLD AUTO: 267 K/UL (ref 150–450)
PMV BLD AUTO: 10.8 FL (ref 9.2–12.9)
POTASSIUM SERPL-SCNC: 4.4 MMOL/L (ref 3.5–5.1)
RBC # BLD AUTO: 4.52 M/UL (ref 4.6–6.2)
SODIUM SERPL-SCNC: 135 MMOL/L (ref 136–145)
WBC # BLD AUTO: 14.64 K/UL (ref 3.9–12.7)

## 2023-08-01 PROCEDURE — 97162 PT EVAL MOD COMPLEX 30 MIN: CPT

## 2023-08-01 PROCEDURE — 97165 OT EVAL LOW COMPLEX 30 MIN: CPT

## 2023-08-01 PROCEDURE — 99900035 HC TECH TIME PER 15 MIN (STAT)

## 2023-08-01 PROCEDURE — 85025 COMPLETE CBC W/AUTO DIFF WBC: CPT | Performed by: PHYSICIAN ASSISTANT

## 2023-08-01 PROCEDURE — 63600175 PHARM REV CODE 636 W HCPCS: Performed by: NEUROLOGICAL SURGERY

## 2023-08-01 PROCEDURE — 11000001 HC ACUTE MED/SURG PRIVATE ROOM

## 2023-08-01 PROCEDURE — 97116 GAIT TRAINING THERAPY: CPT

## 2023-08-01 PROCEDURE — 97535 SELF CARE MNGMENT TRAINING: CPT

## 2023-08-01 PROCEDURE — 94799 UNLISTED PULMONARY SVC/PX: CPT

## 2023-08-01 PROCEDURE — 94761 N-INVAS EAR/PLS OXIMETRY MLT: CPT

## 2023-08-01 PROCEDURE — 63600175 PHARM REV CODE 636 W HCPCS: Performed by: PHYSICIAN ASSISTANT

## 2023-08-01 PROCEDURE — 36415 COLL VENOUS BLD VENIPUNCTURE: CPT | Performed by: PHYSICIAN ASSISTANT

## 2023-08-01 PROCEDURE — 80048 BASIC METABOLIC PNL TOTAL CA: CPT | Performed by: PHYSICIAN ASSISTANT

## 2023-08-01 PROCEDURE — 97530 THERAPEUTIC ACTIVITIES: CPT

## 2023-08-01 PROCEDURE — 25000003 PHARM REV CODE 250: Performed by: NEUROLOGICAL SURGERY

## 2023-08-01 RX ORDER — METOPROLOL SUCCINATE 50 MG/1
50 TABLET, EXTENDED RELEASE ORAL DAILY
Status: DISCONTINUED | OUTPATIENT
Start: 2023-08-01 | End: 2023-08-02 | Stop reason: HOSPADM

## 2023-08-01 RX ORDER — HYDROCHLOROTHIAZIDE 25 MG/1
25 TABLET ORAL DAILY
Status: DISCONTINUED | OUTPATIENT
Start: 2023-08-01 | End: 2023-08-02 | Stop reason: HOSPADM

## 2023-08-01 RX ORDER — ATORVASTATIN CALCIUM 10 MG/1
10 TABLET, FILM COATED ORAL NIGHTLY
Status: DISCONTINUED | OUTPATIENT
Start: 2023-08-01 | End: 2023-08-02 | Stop reason: HOSPADM

## 2023-08-01 RX ORDER — HEPARIN SODIUM 5000 [USP'U]/ML
5000 INJECTION, SOLUTION INTRAVENOUS; SUBCUTANEOUS EVERY 12 HOURS
Status: DISCONTINUED | OUTPATIENT
Start: 2023-08-01 | End: 2023-08-02 | Stop reason: HOSPADM

## 2023-08-01 RX ORDER — LISINOPRIL 20 MG/1
40 TABLET ORAL DAILY
Status: DISCONTINUED | OUTPATIENT
Start: 2023-08-01 | End: 2023-08-02 | Stop reason: HOSPADM

## 2023-08-01 RX ORDER — GABAPENTIN 300 MG/1
300 CAPSULE ORAL 3 TIMES DAILY
Status: DISCONTINUED | OUTPATIENT
Start: 2023-08-01 | End: 2023-08-02 | Stop reason: HOSPADM

## 2023-08-01 RX ADMIN — OXYCODONE AND ACETAMINOPHEN 1 TABLET: 10; 325 TABLET ORAL at 08:08

## 2023-08-01 RX ADMIN — ATORVASTATIN CALCIUM 10 MG: 10 TABLET, FILM COATED ORAL at 08:08

## 2023-08-01 RX ADMIN — HEPARIN SODIUM 5000 UNITS: 5000 INJECTION INTRAVENOUS; SUBCUTANEOUS at 08:08

## 2023-08-01 RX ADMIN — DEXTROSE MONOHYDRATE 3 G: 50 INJECTION, SOLUTION INTRAVENOUS at 01:08

## 2023-08-01 RX ADMIN — OXYCODONE AND ACETAMINOPHEN 1 TABLET: 10; 325 TABLET ORAL at 02:08

## 2023-08-01 RX ADMIN — GABAPENTIN 300 MG: 300 CAPSULE ORAL at 08:08

## 2023-08-01 RX ADMIN — HEPARIN SODIUM 5000 UNITS: 5000 INJECTION INTRAVENOUS; SUBCUTANEOUS at 12:08

## 2023-08-01 RX ADMIN — GABAPENTIN 300 MG: 300 CAPSULE ORAL at 02:08

## 2023-08-01 RX ADMIN — LISINOPRIL 40 MG: 20 TABLET ORAL at 08:08

## 2023-08-01 RX ADMIN — MUPIROCIN: 20 OINTMENT TOPICAL at 08:08

## 2023-08-01 RX ADMIN — METOPROLOL SUCCINATE 50 MG: 50 TABLET, EXTENDED RELEASE ORAL at 08:08

## 2023-08-01 RX ADMIN — METHOCARBAMOL 750 MG: 750 TABLET ORAL at 12:08

## 2023-08-01 RX ADMIN — HYDROCHLOROTHIAZIDE 25 MG: 25 TABLET ORAL at 08:08

## 2023-08-01 NOTE — PROGRESS NOTES
"   WALKER FOR HOME USE [] (Order [id number])  General Supply  Date and Time: 2023  4:48 PM Department: [facility] Rel By/Authorizing: Syeda Ceja PA-C     Dept Order Information    Date Department   2023 [facility]     Order Information    Order Date/Time Release Date/Time Start Date/Time End Date/Time   23 04:48 PM None 2023 None     Order Details    Frequency Duration Priority Order Class   None None Routine Clinic Performed     Quantity    Ordering Quantity   1     Quantity    Ordering Quantity Ordering Quantity   1 1     Order Details    Order ID   [id number]         Reprint Order Requisition    WALKER [employer] HOME USE (Order #[id number]) on 23     Associated Diagnoses     ICD-10-CM ICD-9-CM   Cervical myelopathy  - Primary     G95.9 721.1   S/P cervical spinal fusion     Z98.1 V45.4       WALKER FOR HOME USE: Patient Communication     Not Released  Not seen     Order Questions    Question Answer   Type of Walker: Adult (5'4"-6'6")   With wheels? Yes   Height: 6'2"   Weight: 262 lbs   Length of need (1-99 months): 99   Please check all that apply: Patient's condition impairs ambulation.    Patient needs help to get in and out of chair.    Walker will be used for gait training.    Patient is unable to safely ambulate without equipment.            Process Instructions    Only use the "Resulting Agency" field below if you are sending DME for a patient during a [facility], [facility], or [facility] visit.        Collection Information                                  Patient Details  MRN:[id number]  Name Legal Sex:  SSANA Montano Anthony Male 1955          Address Phone Email Fred   [address] [place] [address] Home: [phone]  Work:   Cell: [phone] [email or web address] ANTHONY MONTANO RODNEY J SONNIER          Inpatient Unit Inpatient Room Inpatient Bed    [facility] MEDICAL SURGICAL UNIT ACUTE K517 K517 A           PCP Allergies   " "  KATHLEEN Groves No Known Allergies                Primary Coverage    Payer Plan Sponsor Code Group Number Group Name   WELLCARE WELLCARE MEDICARE HMO  LLR Giveback Open (PPO) ([id number])       Primary Subscriber    Subscriber ID Subscriber Name Subscriber Address   [id number] ANTHONY BARNES [address] [place] [address]           Additional Information    Associated Reports   Priority and Order Details           ADT-Related Order Information         Encounter    View Encounter               Order Provider Info        Office phone Pager E-mail   Ordering User  Syeda Ceja PA-C  [phone] -- [email or web address]   Authorizing Provider  Syeda Ceja PA-C  [phone] -- [email or web address]     WALKER [employer] HOME USE [[id number]]    Electronically signed by: Syeda Ceja PA-C on 08/01/23 1648 Status: Active   Ordering user: Syeda Ceja PA-C 08/01/23 1648 Ordering provider: Syeda Ceja PA-C   Authorized by: Syeda Ceja PA-C Ordering mode: Standard   Frequency:  08/01/23 -     Diagnoses   Cervical myelopathy [G95.9]   S/P cervical spinal fusion [Z98.1]     Questionnaire    Question Answer   Type of Walker: Adult (5'4"-6'6")   With wheels? Yes   Height: 6'2"   Weight: 262 lbs   Length of need (1-99 months): 99   Please check all that apply: Patient's condition impairs ambulation.   Patient needs help to get in and out of chair.   Walker will be used for gait training.   Patient is unable to safely ambulate without equipment.       Order Diagnosis and CPT Display    Order Diagnosis: Cervical myelopathy [G95.9 (ICD-10-CM)]   S/P cervical spinal fusion [Z98.1 (ICD-10-CM)] CPT:                  Electronically signed by: Syeda Ceja PA-C Lic # [id number] NPI: [id number]         "

## 2023-08-01 NOTE — PLAN OF CARE
Problem: Physical Therapy  Goal: Physical Therapy Goal  Description: Goals to be met by: 23     Patient will increase functional independence with mobility by performin. Supine to sit with Modified [place] utilizing log roll technqiue  2. Sit to stand transfer with Modified [place] with use of RW.  3. Bed to chair transfer with Modified [place] using Rolling Walker  4. Gait  x 150 feet with Modified [place] using Rolling Walker.     Outcome: Ongoing, Progressing       PT/OT co evaluation performed. Pt at this time requires MIN/MOD A with bed mobility and verbal cues to abide by spinal precautions using log roll technique. Pt able to perform transfers to standing/ambulation with MIN A with use of RW. PT recommending low intensity therapy with assistance from family post d/c home. Pt states he will be staying with his granddaughter for the time being and she will be home at all times to assist as needed. PT recommending use of RW with all standing mobility.

## 2023-08-01 NOTE — PT/OT/SLP EVAL
Occupational Therapy   Evaluation    Name: Corey Montano  MRN: [id number]  Admitting Diagnosis: <principal problem not specified>  Recent Surgery: Procedure(s) (LRB):  LAMINECTOMY, SPINE, CERVICAL, WITH FUSION (N/A) 1 Day Post-Op    Recommendations:     Discharge Recommendations:  (low intensity therapy)  Discharge Equipment Recommendations:  walker, rolling, bath bench (does not own correctly fitting RW)  Barriers to discharge:  None    Assessment:     Corey Montano is a 67 y.o. male with a medical diagnosis of <principal problem not specified>.  He presents with The primary encounter diagnosis was Cervical myelopathy. Diagnoses of Cauda equina syndrome and Dorsalgia were also pertinent to this visit.  . Performance deficits affecting function: weakness, decreased ROM, impaired endurance, impaired sensation, decreased coordination, impaired coordination, orthopedic precautions, impaired self care skills, impaired fine motor, decreased upper extremity function, impaired functional mobility, gait instability, impaired balance, pain, impaired skin.         Pt would benefit from cont OT services in order to maximize functional independence. Recommending low intensive therapy at d/c with TTB.     Rehab Prognosis: Good; patient would benefit from acute skilled OT services to address these deficits and reach maximum level of function.       Plan:     Patient to be seen 5 x/week to address the above listed problems via self-care/home management, therapeutic exercises, therapeutic activities  Plan of Care Expires: 09/01/23  Plan of Care Reviewed with: patient, son    Subjective     Chief Complaint: pain in LUE and L hip   Patient/Family Comments/goals: return to (I) PLOF     Occupational Profile:  Living Environment: terry , ramp to enter with L rail, t/s combo 2/2 WIS broken at this time; pt reports he will most likely return to his granddghtr's home, 2 story , no steps to enter and will be staying downstairs    Previous level of function: mod I/independent; increased difficulty with performance of ADLs recently, dropping things, and 4 falls; was using rollator in home   Equipment Used at Home: walker, rolling, rollator  Assistance upon Discharge: from family \; granddghtr 24/7     Pain/Comfort:  Pain Rating 1: 3/10  Location - Side 1: Left  Location 1:  (hip and neck)  Pain Addressed 1: Reposition, Distraction, Cessation of Activity, Nurse notified  Pain Rating Post-Intervention 1:  (did not rate)    Patients cultural, spiritual, Quaker conflicts given the current situation:      Objective:     Communicated with: nsg prior to session.  Patient found supine with   upon OT entry to room.    General Precautions: Standard, fall  Orthopedic Precautions: spinal precautions  Braces: Aspen collar  Respiratory Status: Room air    Occupational Performance:    Bed Mobility:    Patient completed Scooting/Bridging with minimum assistance  Patient completed Supine to Sit with minimum assistance and moderate assistance    Functional Mobility/Transfers:  Patient completed Sit <> Stand Transfer with minimum assistance  with  rolling walker   Patient completed Bed <> Chair Transfer using Step Transfer technique with contact guard assistance and minimum assistance with rolling walker  Functional Mobility: Min A progressing to CGA with RW; cues for posture     Activities of Daily Living:  Upper Body Dressing: moderate assistance adrienne gown as robe 2/2 BUE pain, L>R   Lower Body Dressing: total assistance    Toileting: supervision use of urinal while seated     Cognitive/Visual Perceptual:  WFL     Physical Exam:  Balance:    -       WFL seated; fair/fair - standing   Postural examination/scapula alignment:    -       Rounded shoulders  -       Forward head  Skin integrity: Wound surgical   Edema:  Mild B hands   Sensation:  impaired light touch at radial distribution B hands; intact deep pressure ; reports has improved since surgery    Dominant hand:    -       right   Upper Extremity Range of Motion:   painful shoudler range B; kept within cervical precautions but AAROM required to reach 90 degrees on R; increased pain with movement of L shoulder; WFL distally   Upper Extremity Strength:  impaired B shoulders; not tested 2/2 increased pain, 4-/5 distally with graded testing    Strength:  WFL   Fine Motor Coordination:  WFL      AMPAC 6 Click ADL:  AMPA Total Score: 17    Treatment & Education:  Pt educated on spinal precautions prior to axs, log rolling, and wearing of brace   Requires cues and assist for bed mobility with use of rails   Pt initially with dizziness with movement, BLE ROM performed while seated   Requires assist with LBD; therapist patricia'ing Gem[employer] techs; pt reports impaired movement in RLE at this time and increased pain limiting performance   Shimon to sit EOB and use urinal with supervision from son  Requires assist to thread BUEs through hospital gown   Verbal/tactile cues for hand placement with RW   Functional mobility performed in room and hallway with RW; cues for upright posture and safety with direction changes and navigating obstacles   T/f to b/s chair   Provided with ice packs to B scapulas for pain relief ; elevated LUE on pillows to relieve neck pain/pressure     Patient left up in chair with all lines intact, call button in reach, chair alarm on, and nsg notified    GOALS:   Multidisciplinary Problems       Occupational Therapy Goals          Problem: Occupational Therapy    Goal Priority Disciplines Outcome Interventions   Occupational Therapy Goal     OT, PT/OT Ongoing, Progressing    Description: Goals to be met by: 9/1/23     Patient will increase functional independence with ADLs by performing:    LE Dressing with Modified [place] and Assistive Devices as needed.  Grooming while standing with Modified [place].  Toileting from toilet with Modified [place] for hygiene and clothing  management.   Supine to sit with Modified [place].  Step transfer with Modified [place]  Toilet transfer to toilet with Modified [place].  Increased functional strength to WFL for self care skills and functional mobility.  Upper extremity exercise program x10 reps per handout, with independence.                         History:     History reviewed. No pertinent past medical history.      Past Surgical History:   Procedure Laterality Date    ARTHROSCOPY OF KNEE  1974    CERVICAL LAMINECTOMY WITH SPINAL FUSION N/A 7/31/2023    Procedure: LAMINECTOMY, SPINE, CERVICAL, WITH FUSION;  Surgeon: Steven Clark MD;  Location: [facility];  Service: Neurosurgery;  Laterality: N/A;  prone  chest rolls  neuromonitoring  [place]  Randolph  C3-C6    COLONOSCOPY  07/16/2021    UMBILICAL HERNIA REPAIR         Time Tracking:     OT Date of Treatment: 08/01/23  OT Start Time: 0856  OT Stop Time: 0930  OT Total Time (min): 34 min    Billable Minutes:Evaluation 8  Self Care/Home Management 13  Therapeutic Activity 13    8/1/2023

## 2023-08-01 NOTE — PLAN OF CARE
Problem: Adult Inpatient Plan of Care  Goal: Plan of Care Review  Outcome: Ongoing, Progressing      Pt called back, states her cream was 4%

## 2023-08-01 NOTE — PROGRESS NOTES
Therapy recc RW for pt - item ordered - [facility] personnel informed.    Therapy recc HH / pt/ot at d/c.     VICKY communicated with Celena with [facility] - pt's insurance is out of network   VICKY spoke with Katey at [facility]  [phone]  ---order for RW and info routed to her at F# [phone]  Item will be delivered to pt's bedside.       VICKY rec'd a call from [place] with [facility]  [phone] -- order for RW must be signed by Surgeon or Provider   PA asked to sign or place another order for RW.

## 2023-08-01 NOTE — PT/OT/SLP EVAL
Physical Therapy Evaluation and Treatment    Patient Name:  Corey Montano   MRN:  [id number]    Recommendations:     Discharge Recommendations: home health PT (low intensity therapy)   Discharge Equipment Recommendations: walker, rolling (pt states RW he owns is too wide and the other that is the right size is broken.)   Barriers to discharge: None    Assessment:     Corey Montano is a 67 y.o. male admitted with a medical diagnosis of <principal problem not specified>.  He presents with the following impairments/functional limitations: weakness, impaired endurance, impaired self care skills, impaired functional mobility, gait instability, impaired balance, decreased lower extremity function, decreased upper extremity function, pain, orthopedic precautions.    PT/OT co evaluation performed. Pt at this time requires MIN/MOD A with bed mobility and verbal cues to abide by spinal precautions using log roll technique. Pt able to perform transfers to standing/ambulation with MIN A with use of RW. PT recommending low intensity therapy with assistance from family post d/c home. Pt states he will be staying with his granddaughter for the time being and she will be home at all times to assist as needed. PT recommending use of RW with all standing mobility.    Rehab Prognosis: Good; patient would benefit from acute skilled PT services to address these deficits and reach maximum level of function.    Recent Surgery: Procedure(s) (LRB):  LAMINECTOMY, SPINE, CERVICAL, WITH FUSION (N/A) 1 Day Post-Op    Plan:     During this hospitalization, patient to be seen 5 x/week to address the identified rehab impairments via gait training, therapeutic activities, therapeutic exercises, neuromuscular re-education and progress toward the following goals:    Plan of Care Expires:  09/01/23    Subjective     Chief Complaint: L hip and forearm pain  Patient/Family Comments/goals: to get back home; to get up and walk; get out of  bed  Pain/Comfort:  Pain Rating 1: 3/10  Location - Side 1: Left  Location 1:  (hip and forearm)  Pain Addressed 1: Reposition, Cessation of Activity, Nurse notified  Pain Rating Post-Intervention 1:  (not rated)    Patients cultural, spiritual, Rastafarian conflicts given the current situation: no    Living Environment:  Pt lives alone in a double wide trailer with ramp access with rail on L side; with walk in shower (not functioning at this time) *has tub/shower combo but states he has recently felt unsafe to use and has been sponge bathing. Pt states post acute stay he will be staying at granddaughter's home which is a 2 story (he can live on 1st) with no steps to enter (granddaughter can provide assistance).   Prior to admission, patients level of function was MOD I with rollator/needing assistance with some ADLs.  Equipment used at home: walker, rolling, rollator.  DME owned (not currently used): none.  Upon discharge, patient will have assistance from granddaughter.    Objective:     Communicated with Nurse prior to session.  Patient found HOB elevated with bed alarm, peripheral IV, KARLA drain  upon PT entry to room.    General Precautions: Standard, fall  Orthopedic Precautions:spinal precautions   Braces: Aspen collar (cervical collar)  Respiratory Status: Room air    Exams:  Cognitive Exam:  Patient is oriented to Person, Place, Time, and Situation  Sensation:    -       Intact  light/touch to BLE  RLE ROM: WFL  RLE Strength: WFL  LLE ROM: limited hip flexion due to pain; knee/ankle WFL  LLE Strength: WFL    Functional Mobility:  Bed Mobility:     Rolling Right: minimum assistance and with verbal cues to perform log roll technique  Scooting: stand by assistance and in sitting to scoot towards EOB  Supine to Sit: MIN/MOD A and verbal cues to abide by spinal precautions and utilize log roll technique  Transfers:     Sit to Stand:  minimum assistance and of 2 persons with rolling walker  Bed to Chair: minimum  assistance with  rolling walker  using  Step Transfer  Gait: ~125ft with use of RW and MIN A progressing to CGA; requires verbal cues for proper sequencing with use of RW and safety when navigating turns; limited BLE step height and length and dependence on RW for stability in stance.       AM-PAC 6 CLICK MOBILITY  Total Score:16       Treatment & Education:  Pt educated on role of therapy.   Pt educated on spinal precautions; and utilization of log roll technique with bed mobility.   Pt wearing cervical collar at all times during evaluation.  Pt performs mobility as stated above in functional mobility section of note.   Pt educated on importance of OOB mobility and calling for assistance from staff for mobility at this time.   Educated on need for RW at this time with all standing mobility.     Patient left up in chair with all lines intact, call button in reach, chair alarm on, nurse notified, and son present.    GOALS:   Multidisciplinary Problems       Physical Therapy Goals          Problem: Physical Therapy    Goal Priority Disciplines Outcome Goal Variances Interventions   Physical Therapy Goal     PT, PT/OT Ongoing, Progressing     Description: Goals to be met by: 23     Patient will increase functional independence with mobility by performin. Supine to sit with Modified [place] utilizing log roll technqiue  2. Sit to stand transfer with Modified [place] with use of RW.  3. Bed to chair transfer with Modified [place] using Rolling Walker  4. Gait  x 150 feet with Modified [place] using Rolling Walker.                          History:     History reviewed. No pertinent past medical history.    Past Surgical History:   Procedure Laterality Date    ARTHROSCOPY OF KNEE      CERVICAL LAMINECTOMY WITH SPINAL FUSION N/A 2023    Procedure: LAMINECTOMY, SPINE, CERVICAL, WITH FUSION;  Surgeon: Steven Clark MD;  Location: [facility] OR;  Service: Neurosurgery;  Laterality: N/A;   prone  chest rolls  neuromonitoring  [facility]  C3-C6    COLONOSCOPY  07/16/2021    UMBILICAL HERNIA REPAIR         Time Tracking:     PT Received On: 08/01/23  PT Start Time: 0856     PT Stop Time: 0930  PT Total Time (min): 34 min with OT    Billable Minutes: Evaluation 10, Gait Training 14, and Therapeutic Activity 10      08/01/2023

## 2023-08-01 NOTE — PROGRESS NOTES
[facility]  Neurosurgery  Progress Note    Subjective:     Interval History: Neck pain.  Left forearm pain.  Hand tightness and paresthesias improved.    History of Present Illness:     67-year-old man presented to the clinic last week with lumbar MRI and weakness involving all 4 extremities with increased reflexes.  Urgent MRI was obtained which showed severe cervical stenosis with myelopathy changes.  Urgent decompression was recommended from posterior from C3-6.    Post-Op Info:  Procedure(s) (LRB):  LAMINECTOMY, SPINE, CERVICAL, WITH FUSION (N/A)   1 Day Post-Op      Medications:  Continuous Infusions:  Scheduled Meds:   atorvastatin  10 mg Oral QHS    gabapentin  300 mg Oral TID    heparin (porcine)  5,000 Units Subcutaneous Q12H    hydroCHLOROthiazide  25 mg Oral Daily    lisinopriL  40 mg Oral Daily    metoprolol succinate  50 mg Oral Daily    mupirocin   Nasal BID     PRN Meds:0.9%  NaCl infusion (for blood administration), acetaminophen, aluminum-magnesium hydroxide-simethicone, bisacodyL, HYDROmorphone, HYDROmorphone, methocarbamoL, ondansetron, oxyCODONE-acetaminophen, oxyCODONE-acetaminophen, prochlorperazine, senna-docusate 8.6-50 mg     Review of Systems  Objective:     Weight: 118.8 kg (262 lb)  Body mass index is 33.64 kg/m².  Vital Signs (Most Recent):  Temp: 98.2 °F (36.8 °C) (08/01/23 0734)  Pulse: 88 (08/01/23 0734)  Resp: 18 (08/01/23 0805)  BP: 126/63 (08/01/23 0734)  SpO2: 95 % (08/01/23 0734) Vital Signs (24h Range):  Temp:  [97.5 °F (36.4 °C)-98.4 °F (36.9 °C)] 98.2 °F (36.8 °C)  Pulse:  [] 88  Resp:  [10-33] 18  SpO2:  [93 %-100 %] 95 %  BP: (126-189)/() 126/63     Date 08/01/23 0700 - 08/02/23 0659   Shift 2170-0858 4022-2622 9549-0124 24 Hour Total   INTAKE   Shift Total(mL/kg)       OUTPUT   Urine(mL/kg/hr) 300   300   Shift Total(mL/kg) 300(2.5)   300(2.5)   Weight (kg) 118.8 118.8 118.8 118.8                            Closed/Suction Drain 07/31/23 1235 Posterior  "Neck Accordion 10 Fr. (Active)   Site Description Unable to view 08/01/23 0755   Dressing Type Transparent (Tegaderm) 08/01/23 0755   Dressing Status Clean;Dry;Intact 08/01/23 0755   Dressing Intervention Integrity maintained 08/01/23 0755   Drainage Serosanguineous 08/01/23 0755   Status To bulb suction 08/01/23 0755   Output (mL) 100 mL 08/01/23 0654       Neurosurgery Physical Exam    General: well developed, well nourished, no distress  Mental Status: Awake, Alert, Oriented X3.Thought content appropriate  GCS: Motor: 6/Verbal: 5/Eyes: 4 GCS Total: 15    Motor Strength:  Strength  Deltoids Triceps Biceps Wrist Extension Wrist Flexion Hand    Upper: R 5/5 5/5 5/5 5/5 5/5 5/5    L 4/5 5/5 5/5 5/5 5/5 5/5     HF KF KE DF PF EHL   Lower: R 5/5 5/5 5/5 5/5 5/5 5/5    L 5/5 5/5 5/5 5/5 5/5 5/5       Vidal: present B/L  Clonus: absent B/L  Incision- CDI  Drain- 220      Significant Labs:  No results for input(s): "GLU", "NA", "K", "CL", "CO2", "BUN", "CREATININE", "CALCIUM", "MG" in the last 48 hours.  No results for input(s): "WBC", "HGB", "HCT", "PLT" in the last 48 hours.  Recent Labs   Lab 07/31/23  0712   INR 0.9   APTT 27.9     Microbiology Results (last 7 days)       ** No results found for the last 168 hours. **              Assessment/Plan:     There are no hospital problems to display for this patient.  A/P:  POD #1 C3-6 PCF     --Neurologically stable          -q4h neuro checks  --Imaging: Post op xrays pending  --Drains: Continue  --Pain control: Percocet, dilaudid, robaxin PRN  --DVT ppx: TEDs/SCDs/add SQH  --Activity: PT/OT, OOB. C-collar  --Diet: Regular, Saline Lock IV  --Bowel regimen: PRN suppository, senna PRN  --Urinary: Voiding spontaneously  --Atelectasis ppx: Encourage IS hourly       --Labs today     Dispo: Pending PT/OT recs, imaging, drain    All of the above discussed and reviewed with Dr. Clark.      LYNDSAY WhitmanC  Neurosurgery  [facility]    "

## 2023-08-01 NOTE — PLAN OF CARE
Problem: Occupational Therapy  Goal: Occupational Therapy Goal  Description: Goals to be met by: 9/1/23     Patient will increase functional independence with ADLs by performing:    LE Dressing with Modified [place] and Assistive Devices as needed.  Grooming while standing with Modified [place].  Toileting from toilet with Modified [place] for hygiene and clothing management.   Supine to sit with Modified [place].  Step transfer with Modified [place]  Toilet transfer to toilet with Modified [place].  Increased functional strength to WFL for self care skills and functional mobility.  Upper extremity exercise program x10 reps per handout, with independence.    Outcome: Ongoing, Progressing     Pt would benefit from cont OT services in order to maximize functional independence. Recommending low intensive therapy at d/c with RW and TTB.

## 2023-08-01 NOTE — PROGRESS NOTES
Corey Montano #[id number] (CSN: [id number]) (67 y.o. M) (Adm: 07/31/23)  [facility] [id number]  PCP    XOCHILT ELLISON  Date of Birth    1955     Demographics    Address:   [address] [place] [address] [address] [place] [address]    Home Phone:   [phone]    Work Phone:       Mobile Phone:   [phone]              SSN:       Insurance:   [employer]    Marital Status:       Mu-ism:   Congregation                Admission Dx    G95.9 Cervical myelopathy (Cervical myelopathy [G95.9])     Chief Complaint    None  Documents Filed to Patient    Power of  Living Will Clinical Unknown Study Attachment Consent Form ABN Waiver After Visit Summary Lab Result Scan Code Status Patient Portal Status    Not on File  Not on File  Not on File  Not on File  Not on File  Not on File  Filed  Not on File  Not on file  Active     Admission Information    Current Information    Attending Provider Admitting Provider Admission Type Admission Status   MD Steven Reid MD Elective Confirmed Admission          Admission Date/Time Discharge Date Hospital Service Auth/Cert Status   07/31/23  0646  Neurosurgery Incomplete          Hospital Area Unit Room/Bed    [facility] MEDICAL SURGICAL UNIT ACUTE K517/K517 A              Hospital Account    Name Acct ID Class Status Primary Coverage   Corey Montano [id number] IP- Inpatient Open WELLCARE - WELLCARE MEDICARE HMO            Guarantor Account (for Hospital Account #[id number])    Name Relation to Pt Service Area Active? Acct Type   Corey Montano Self OHSSA Yes Personal/Family   Address Phone     [address] [place] [address] [phone](H)              Coverage Information (for Hospital Account #[id number])    F/O Payor/Plan Precert #   WELLCARE/WELLCARE MEDICARE O [id number]   Subscriber Subscriber #   Corey Montano [id number]   Address Phone   [address] [address]   [place] [address] [phone]            Emergency Contact  "Information    Name: Victorino Montano Relationship: Son   Address:     City:  State:  Zip:  Phone:     Business phone:       [employer] [] (Order [id number])  General Supply  Date and Time: 2023 10:19 AM Department: [facility] Medical Surgical Unit Acute Rel By/Authorizing: Steven Clark MD     Dept Order Information    Date Department   2023 [facility] Medical Surgical Unit Acute     Order Information    Order Date/Time Release Date/Time Start Date/Time End Date/Time   23 10:19 AM None 2023 None     Order Details    Frequency Duration Priority Order Class   None None Routine Clinic Performed     Quantity    Ordering Quantity   1     Quantity    Ordering Quantity Ordering Quantity   1 1     Order Details    Order ID   [id number]         Reprint Order Requisition    [employer] (Order #[id number]) on 23     Associated Diagnoses     ICD-10-CM ICD-9-CM   Cervical myelopathy  - Primary     G95.9 721.1   Cauda equina syndrome     G83.4 344.60   Dorsalgia     M54.9 724.5       [employer]: Patient Communication     Not Released  Not seen     Order Questions    Question Answer   Type of Walker: Adult (5'4"-6'6")   With wheels? Yes   Height: 6' 2" (1.88 m)   Weight: 118.8 kg (262 lb)   Length of need (1-99 months): 99   Does patient have medical equipment at home? walker, rolling    rollator   Please check all that apply: Patient's condition impairs ambulation.    Patient is unable to safely ambulate without equipment.            Process Instructions    Only use the "Resulting Agency" field below if you are sending DME for a patient during a [facility], [facility], or [facility] visit.        Collection Information                            Verbal Order Info    Action Created on Order Mode Entered by Responsible Provider Signed by Signed on   Ordering 23 1019 Verbal with readback TODD Mcneil MD             Patient Details  MRN:[id number]  Name Legal Sex:  SSN " "  Anthony Barnes Male 1955          Address Phone Email Fred   [address] [place] [address] Home: [phone]  Work:   Cell: [phone] [email or web address] ANTHONY BARNES RODNEY J SONNIER          Inpatient Unit Inpatient Room Inpatient Bed    [facility] MEDICAL SURGICAL UNIT ACUTE K517 K517 A           PCP Allergies     KATHLEEN Groves No Known Allergies                Primary Visit Coverage    Payer Plan Sponsor Code Group Number Group Name   WELLCARE WELLCARE MEDICARE HMO  LLR Giveback Open (PPO) ([id number])       Primary Visit Coverage Subscriber    Subscriber ID Subscriber Name Subscriber Address   [id number] ANTHONY BARNES [address] [place] [id number]           Additional Information    Associated Reports   Priority and Order Details           ADT-Related Order Information         Encounter    View Encounter               Order Provider Info        Office phone Pager E-mail   Ordering User Marie Marie [phone] -- [email or web address]   Authorizing Provider Steven Clark [phone] -- [email or web address]   Attending Provider Steven Clark [phone] -- [email or web address]   Entered By Marie Marie [phone] -- [email or web address]   Ordering Provider Steven Clark [phone] -- [email or web address]     [employer] [[id number]]    Awaiting signature from: Steven Clark MD Status: Active   Mode: Ordering in Verbal with readback mode Communicated by: Marie Marie RN   Ordering user: Marie Marie RN 08/01/23 1019 Ordering provider: Steven Clark MD   Authorized by: Steven Clark MD Ordering mode: Verbal with readback   Frequency:  08/01/23 -     Diagnoses   Cervical myelopathy [G95.9]   Cauda equina syndrome [G83.4]   Dorsalgia [M54.9]     Questionnaire    Question Answer   Type of Walker: Adult (5'4"-6'6")   With wheels? Yes   Height: 6' 2" (1.88 m)   Weight: 118.8 kg (262 lb)   Length of " need (1-99 months): 99   Does patient have medical equipment at home? walker, rolling   rollator   Please check all that apply: Patient's condition impairs ambulation.   Patient is unable to safely ambulate without equipment.       Order Diagnosis and CPT Display    Order Diagnosis: Cervical myelopathy [G95.9 (ICD-10-CM)]   Cauda equina syndrome [G83.4 (ICD-10-CM)]   Dorsalgia [M54.9 (ICD-10-CM)] CPT:                  Electronically signed by: Marie Marie RN Lic #  < Not on File > NPI:  < Not on File >   Authorized by: Steven Clark MD Lic # [id number] NPI: [id number]

## 2023-08-01 NOTE — PLAN OF CARE
CM met with pt -   dx:  Cervical Myelopathy;  7/31  s/p Laminectomy      Per therapy - pt needs a right-sized RW - order sent to [facility] (as per [facility] instructions); Duramed needs an order signed by MD or TODD BROOKS informed per Secure Chat      Pt's son  Victorino Angelo  will pick him up at d/c   Pt will recuperate with Granddaughter Sheldon  [address]  [id number]      pt prefers to go to Outpt Physical Therapy in [place] vs. home with home health - pt/ot     Future Appointments   Date Time Provider Department Center   10/3/2023  9:30 AM KATHLEEN Groves [facility] [place]         08/01/23 1622   Discharge Assessment   Assessment Type Discharge Planning Assessment   Confirmed/corrected address, phone number and insurance Yes   Confirmed Demographics Correct on Facesheet   Source of Information patient;health record   Does patient/caregiver understand observation status Yes   Communicated CHRISTY with patient/caregiver Yes   Reason For Admission Dx Cervical Mylopathy - 7/31 s/p Laminectomy   People in Home grandchild(tory)   Do you expect to return to your current living situation? No  (will recuperate with granddaughter Baron [phone])   Do you have help at home or someone to help you manage your care at home? Yes   Who are your caregiver(s) and their phone number(s)? darad Angelo [phone]; bony Angelo [phone]   Prior to hospitilization cognitive status: Alert/Oriented   Current cognitive status: Alert/Oriented   Walking or Climbing Stairs ambulation difficulty, requires equipment   Home Layout Able to live on 1st floor   Equipment Currently Used at Home walker, rolling  (pt needs another walker - current walker is not the right size)   Readmission within 30 days? No   Patient currently being followed by outpatient case management? No   Do you currently have service(s) that help you manage your care at home? No   Do you take prescription medications? No   Do you have  prescription coverage? No   Do you have any problems affording any of your prescribed medications? No   Is the patient taking medications as prescribed? yes   Who is going to help you get home at discharge? son Victorino   How do you get to doctors appointments? car, drives self;family or friend will provide   Are you on dialysis? No   Do you take coumadin? No   Discharge Plan A Home;Home with family   Discharge Plan B Home;Home with family;Home Health   DME Needed Upon Discharge  walker, rolling   Discharge Plan discussed with: Patient   Transition of Care Barriers None

## 2023-08-02 ENCOUNTER — TELEPHONE (OUTPATIENT)
Dept: NEUROSURGERY | Facility: CLINIC | Age: 68
End: 2023-08-02
Payer: MEDICARE

## 2023-08-02 VITALS
HEART RATE: 85 BPM | DIASTOLIC BLOOD PRESSURE: 62 MMHG | TEMPERATURE: 98 F | BODY MASS INDEX: 34.38 KG/M2 | HEIGHT: 74 IN | OXYGEN SATURATION: 97 % | SYSTOLIC BLOOD PRESSURE: 115 MMHG | WEIGHT: 267.88 LBS | RESPIRATION RATE: 18 BRPM

## 2023-08-02 PROBLEM — G95.9 CERVICAL MYELOPATHY: Status: ACTIVE | Noted: 2023-08-02

## 2023-08-02 LAB
ANION GAP SERPL CALC-SCNC: 8 MMOL/L (ref 8–16)
BASOPHILS # BLD AUTO: 0.02 K/UL (ref 0–0.2)
BASOPHILS NFR BLD: 0.2 % (ref 0–1.9)
BUN SERPL-MCNC: 22 MG/DL (ref 8–23)
CALCIUM SERPL-MCNC: 9 MG/DL (ref 8.7–10.5)
CHLORIDE SERPL-SCNC: 101 MMOL/L (ref 95–110)
CO2 SERPL-SCNC: 26 MMOL/L (ref 23–29)
CREAT SERPL-MCNC: 0.8 MG/DL (ref 0.5–1.4)
DIFFERENTIAL METHOD: ABNORMAL
EOSINOPHIL # BLD AUTO: 0.1 K/UL (ref 0–0.5)
EOSINOPHIL NFR BLD: 0.8 % (ref 0–8)
ERYTHROCYTE [DISTWIDTH] IN BLOOD BY AUTOMATED COUNT: 13.3 % (ref 11.5–14.5)
EST. GFR  (NO RACE VARIABLE): >60 ML/MIN/1.73 M^2
GLUCOSE SERPL-MCNC: 98 MG/DL (ref 70–110)
HCT VFR BLD AUTO: 40.8 % (ref 40–54)
HGB BLD-MCNC: 13.5 G/DL (ref 14–18)
IMM GRANULOCYTES # BLD AUTO: 0.04 K/UL (ref 0–0.04)
IMM GRANULOCYTES NFR BLD AUTO: 0.4 % (ref 0–0.5)
LYMPHOCYTES # BLD AUTO: 2.2 K/UL (ref 1–4.8)
LYMPHOCYTES NFR BLD: 20.7 % (ref 18–48)
MCH RBC QN AUTO: 31.1 PG (ref 27–31)
MCHC RBC AUTO-ENTMCNC: 33.1 G/DL (ref 32–36)
MCV RBC AUTO: 94 FL (ref 82–98)
MONOCYTES # BLD AUTO: 1 K/UL (ref 0.3–1)
MONOCYTES NFR BLD: 9.4 % (ref 4–15)
NEUTROPHILS # BLD AUTO: 7.3 K/UL (ref 1.8–7.7)
NEUTROPHILS NFR BLD: 68.5 % (ref 38–73)
NRBC BLD-RTO: 0 /100 WBC
PLATELET # BLD AUTO: 231 K/UL (ref 150–450)
PMV BLD AUTO: 11 FL (ref 9.2–12.9)
POTASSIUM SERPL-SCNC: 4.1 MMOL/L (ref 3.5–5.1)
RBC # BLD AUTO: 4.34 M/UL (ref 4.6–6.2)
SODIUM SERPL-SCNC: 135 MMOL/L (ref 136–145)
WBC # BLD AUTO: 10.63 K/UL (ref 3.9–12.7)

## 2023-08-02 PROCEDURE — 63600175 PHARM REV CODE 636 W HCPCS: Performed by: PHYSICIAN ASSISTANT

## 2023-08-02 PROCEDURE — 97530 THERAPEUTIC ACTIVITIES: CPT | Mod: CO

## 2023-08-02 PROCEDURE — 97530 THERAPEUTIC ACTIVITIES: CPT | Mod: CQ

## 2023-08-02 PROCEDURE — 97116 GAIT TRAINING THERAPY: CPT | Mod: CQ

## 2023-08-02 PROCEDURE — 94761 N-INVAS EAR/PLS OXIMETRY MLT: CPT

## 2023-08-02 PROCEDURE — 25000003 PHARM REV CODE 250: Performed by: NEUROLOGICAL SURGERY

## 2023-08-02 PROCEDURE — 94799 UNLISTED PULMONARY SVC/PX: CPT

## 2023-08-02 PROCEDURE — 80048 BASIC METABOLIC PNL TOTAL CA: CPT | Performed by: PHYSICIAN ASSISTANT

## 2023-08-02 PROCEDURE — 85025 COMPLETE CBC W/AUTO DIFF WBC: CPT | Performed by: PHYSICIAN ASSISTANT

## 2023-08-02 PROCEDURE — 99900035 HC TECH TIME PER 15 MIN (STAT)

## 2023-08-02 PROCEDURE — 36415 COLL VENOUS BLD VENIPUNCTURE: CPT | Performed by: PHYSICIAN ASSISTANT

## 2023-08-02 RX ORDER — METHOCARBAMOL 750 MG/1
750 TABLET, FILM COATED ORAL EVERY 8 HOURS PRN
Qty: 60 TABLET | Refills: 0 | Status: SHIPPED | OUTPATIENT
Start: 2023-08-02 | End: 2023-08-17 | Stop reason: SDUPTHER

## 2023-08-02 RX ORDER — OXYCODONE AND ACETAMINOPHEN 10; 325 MG/1; MG/1
1 TABLET ORAL EVERY 6 HOURS PRN
Qty: 60 TABLET | Refills: 0 | Status: SHIPPED | OUTPATIENT
Start: 2023-08-02 | End: 2023-09-15 | Stop reason: SDUPTHER

## 2023-08-02 RX ADMIN — HEPARIN SODIUM 5000 UNITS: 5000 INJECTION INTRAVENOUS; SUBCUTANEOUS at 08:08

## 2023-08-02 RX ADMIN — METOPROLOL SUCCINATE 50 MG: 50 TABLET, EXTENDED RELEASE ORAL at 08:08

## 2023-08-02 RX ADMIN — HYDROCHLOROTHIAZIDE 25 MG: 25 TABLET ORAL at 08:08

## 2023-08-02 RX ADMIN — OXYCODONE AND ACETAMINOPHEN 1 TABLET: 10; 325 TABLET ORAL at 05:08

## 2023-08-02 RX ADMIN — LISINOPRIL 40 MG: 20 TABLET ORAL at 08:08

## 2023-08-02 RX ADMIN — OXYCODONE AND ACETAMINOPHEN 1 TABLET: 10; 325 TABLET ORAL at 10:08

## 2023-08-02 RX ADMIN — MUPIROCIN: 20 OINTMENT TOPICAL at 08:08

## 2023-08-02 RX ADMIN — GABAPENTIN 300 MG: 300 CAPSULE ORAL at 08:08

## 2023-08-02 NOTE — PLAN OF CARE
x   08/02/23 1149   Discharge Planning   Assessment Type   (x)   Resource/Environmental Concerns   (x)   Support Systems   (x)   Assistance Needed   (x)   Equipment Currently Used at Home   (x)   Current Living Arrangements   (x)   Care Facility Name   (x)   Patient/Family Anticipates Transition to   (x)   Patient/Family Anticipated Services at Transition   (x)   DME Needed Upon Discharge    (x)   Discharge Plan A   (x)   Discharge Plan B   (x)   Physical Activity   On average, how many days per week do you engage in moderate to strenuous exercise (like a brisk walk)?   (x)   On average, how many minutes do you engage in exercise at this level?   (x)   Financial Resource Strain   How hard is it for you to pay for the very basics like food, housing, medical care, and heating?   (x)   Housing Stability   In the last 12 months, was there a time when you were not able to pay the mortgage or rent on time?   (x)   In the last 12 months, how many places have you lived?   (x)   In the last 12 months, was there a time when you did not have a steady place to sleep or slept in a shelter (including now)?   (x)   Transportation Needs   In the past 12 months, has lack of transportation kept you from medical appointments or from getting medications?   (x)   In the past 12 months, has lack of transportation kept you from meetings, work, or from getting things needed for daily living?   (x)   Food Insecurity   Within the past 12 months, you worried that your food would run out before you got the money to buy more.   (x)   Within the past 12 months, the food you bought just didn't last and you didn't have money to get more.   (x)   Stress   Do you feel stress - tense, restless, nervous, or anxious, or unable to sleep at night because your mind is troubled all the time - these days?   (x)   Social Connections   In a typical week, how many times do you talk on the phone with family, friends, or neighbors?   (x)   How often do you get  together with friends or relatives?   (x)   How often do you attend Episcopal or Muslim services?   (x)   Do you belong to any clubs or organizations such as Episcopal groups, unions, fraternal or athletic groups, or school groups?   (x)   How often do you attend meetings of the clubs or organizations you belong to?   (x)   Are you , , , , never , or living with a partner?   (x)   Alcohol Use   Q1: How often do you have a drink containing alcohol?   (x)   Q2: How many drinks containing alcohol do you have on a typical day when you are drinking?   (x)   Q3: How often do you have six or more drinks on one occasion?   (x)

## 2023-08-02 NOTE — PLAN OF CARE
[facility]    HOME HEALTH ORDERS  FACE TO FACE ENCOUNTER    Patient Name: Corey Montano  YOB: 1955    PCP: KATHLEEN Groves   PCP Address: [address] [facility] [place] [address]  PCP Phone Number: [phone]  PCP Fax: [phone]       Encounter Date: 08/02/2023    Admit to Home Health    Diagnoses:  Active Hospital Problems    Diagnosis  POA    *Cervical myelopathy [G95.9]  Yes      Resolved Hospital Problems   No resolved problems to display.       Future Appointments   Date Time Provider Department Center   10/3/2023  9:30 AM KATHLEEN Groves [facility] [place]            I have seen and examined this patient face to face today. My clinical findings that support the need for the home health skilled services and home bound status are the following:  Weakness/numbness causing balance and gait disturbance due to Weakness/Debility and Surgery making it taxing to leave home.  Requiring assistive device to leave home due to unsteady gait caused by  Weakness/Debility and Surgery.  Medical restrictions requiring assistance of another human to leave home due to  Unstable ambulation, Decreased range of motions in extremities, and Post surgery monitoring.    Allergies:Review of patient's allergies indicates:  No Known Allergies    Diet: regular diet    Activities: activity as tolerated    Nursing:   SN to complete comprehensive assessment including routine vital signs. Instruct on disease process and s/s of complications to report to MD. Review/verify medication list sent home with the patient at time of discharge  and instruct patient/caregiver as needed. Frequency may be adjusted depending on start of care date.    Notify MD if SBP > 160 or < 90; DBP > 90 or < 50; HR > 120 or < 50; Temp > 101;        CONSULTS:    Physical Therapy to evaluate and treat. Evaluate for home safety and equipment needs; Establish/upgrade home exercise program. Perform / instruct on therapeutic  exercises, gait training, transfer training, and Range of Motion.  Occupational Therapy to evaluate and treat. Evaluate home environment for safety and equipment needs. Perform/Instruct on transfers, ADL training, ROM, and therapeutic exercises.  Aide to provide assistance with personal care, ADLs, and vital signs.      Medications: Review discharge medications with patient and family and provide education.      Current Discharge Medication List        START taking these medications    Details   methocarbamoL (ROBAXIN) 750 MG Tab Take 1 tablet (750 mg total) by mouth every 8 (eight) hours as needed (muscle spasms).  Qty: 60 tablet, Refills: 0      oxyCODONE-acetaminophen (PERCOCET)  mg per tablet Take 1 tablet by mouth every 6 (six) hours as needed for Pain.  Qty: 60 tablet, Refills: 0    Comments: Quantity prescribed more than 7 day supply? Yes, quantity medically necessary           CONTINUE these medications which have NOT CHANGED    Details   atorvastatin (LIPITOR) 10 MG tablet Take 1 tablet (10 mg total) by mouth every evening.  Qty: 90 tablet, Refills: 1    Associated Diagnoses: Hyperlipidemia, unspecified hyperlipidemia type      hydroCHLOROthiazide (HYDRODIURIL) 25 MG tablet Take 1 tablet (25 mg total) by mouth once daily.  Qty: 90 tablet, Refills: 1    Comments: .  Associated Diagnoses: Hypertension, unspecified type      lisinopriL (PRINIVIL,ZESTRIL) 40 MG tablet Take 1 tablet (40 mg total) by mouth once daily.  Qty: 90 tablet, Refills: 1    Comments: .  Associated Diagnoses: Hypertension, unspecified type      metoprolol succinate (TOPROL-XL) 50 MG 24 hr tablet Take 1 tablet (50 mg total) by mouth once daily.  Qty: 90 tablet, Refills: 1    Comments: .  Associated Diagnoses: Hypertension, unspecified type      gabapentin (NEURONTIN) 300 MG capsule Take 1 capsule (300 mg total) by mouth 3 (three) times daily.  Qty: 90 capsule, Refills: 0    Associated Diagnoses: Dorsalgia, unspecified      tadalafiL  (CIALIS) 10 MG tablet Take 1 tablet (10 mg total) by mouth daily as needed for Erectile Dysfunction.  Qty: 10 tablet, Refills: 1    Associated Diagnoses: Erectile dysfunction, unspecified erectile dysfunction type           STOP taking these medications       diclofenac (VOLTAREN) 75 MG EC tablet Comments:   Reason for Stopping:               I certify that this patient is confined to his home and needs intermittent skilled nursing care, physical therapy, and occupational therapy.      Syeda Ceja, [facility], PA-C  Neurosurgery  [facility]  08/02/2023

## 2023-08-02 NOTE — NURSING
AVS reviewed with patient by virtual nurse. Verbalized understanding of upcoming appointments and home medications. IV removed. Prescriptions delivered to bedside. DME to be delivered to home. W/c transport to be called when son arrives to bring him home. Voiced no concerns.

## 2023-08-02 NOTE — PROGRESS NOTES
[facility]  Neurosurgery  Progress Note    Subjective:     Interval History: Some neck pain to the left shoulder.  No arm pain or paresthesias.  Hand feel less tight and numb.  Walked with PT yesterday.    History of Present Illness:     67-year-old man presented to the clinic last week with lumbar MRI and weakness involving all 4 extremities with increased reflexes.  Urgent MRI was obtained which showed severe cervical stenosis with myelopathy changes.  Urgent decompression was recommended from posterior from C3-6.       Post-Op Info:  Procedure(s) (LRB):  LAMINECTOMY, SPINE, CERVICAL, WITH FUSION (N/A)   2 Days Post-Op      Medications:  Continuous Infusions:  Scheduled Meds:   atorvastatin  10 mg Oral QHS    gabapentin  300 mg Oral TID    heparin (porcine)  5,000 Units Subcutaneous Q12H    hydroCHLOROthiazide  25 mg Oral Daily    lisinopriL  40 mg Oral Daily    metoprolol succinate  50 mg Oral Daily    mupirocin   Nasal BID     PRN Meds:0.9%  NaCl infusion (for blood administration), acetaminophen, aluminum-magnesium hydroxide-simethicone, bisacodyL, HYDROmorphone, HYDROmorphone, methocarbamoL, ondansetron, oxyCODONE-acetaminophen, oxyCODONE-acetaminophen, prochlorperazine, senna-docusate 8.6-50 mg     Review of Systems  Objective:     Weight: 121.5 kg (267 lb 13.7 oz)  Body mass index is 34.39 kg/m².  Vital Signs (Most Recent):  Temp: 97.6 °F (36.4 °C) (08/02/23 0733)  Pulse: 77 (08/02/23 0733)  Resp: 17 (08/02/23 0733)  BP: 129/67 (08/02/23 0858)  SpO2: 96 % (08/02/23 0733) Vital Signs (24h Range):  Temp:  [97.4 °F (36.3 °C)-98.2 °F (36.8 °C)] 97.6 °F (36.4 °C)  Pulse:  [77-86] 77  Resp:  [17-20] 17  SpO2:  [95 %-99 %] 96 %  BP: (113-142)/(56-89) 129/67                              Closed/Suction Drain 07/31/23 1235 Posterior Neck Accordion 10 Fr. (Active)   Site Description Unable to view 08/02/23 0910   Dressing Type Transparent (Tegaderm) 08/02/23 0910   Dressing Status Clean;Dry;Intact 08/02/23 0910  "  Dressing Intervention Integrity maintained 08/02/23 0910   Drainage Serosanguineous 08/02/23 0910   Status To bulb suction 08/02/23 0910   Output (mL) 75 mL 08/01/23 1858       Neurosurgery Physical Exam    General: well developed, well nourished, no distress  Mental Status: Awake, Alert, Oriented X3.Thought content appropriate  GCS: Motor: 6/Verbal: 5/Eyes: 4 GCS Total: 15    Motor Strength:  Strength  Deltoids Triceps Biceps Wrist Extension Wrist Flexion Hand    Upper: R 5/5 5/5 5/5 5/5 5/5 5/5    L 4/5 5/5 5/5 5/5 5/5 5/5     HF KF KE DF PF EHL   Lower: R 5/5 5/5 5/5 5/5 5/5 5/5    L 5/5 5/5 5/5 5/5 5/5 5/5       Vidal: present B/L  Clonus: absent B/L  Incision- CDI  Drain- 75      Significant Labs:  Recent Labs   Lab 08/01/23  1113 08/02/23  0510   * 98   * 135*   K 4.4 4.1    101   CO2 24 26   BUN 16 22   CREATININE 0.8 0.8   CALCIUM 9.5 9.0     Recent Labs   Lab 08/01/23  1113 08/02/23  0510   WBC 14.64* 10.63   HGB 14.7 13.5*   HCT 41.8 40.8    231     No results for input(s): "LABPT", "INR", "APTT" in the last 48 hours.  Microbiology Results (last 7 days)       ** No results found for the last 168 hours. **              Assessment/Plan:     There are no hospital problems to display for this patient.    There are no hospital problems to display for this patient.  A/P:  POD #2 C3-6 PCF                --Neurologically stable          -q4h neuro checks  --Imaging: Post op xrays show good hardware placement  --Drains: DC today  --Pain control: Percocet, dilaudid, robaxin PRN  --DVT ppx: TEDs/SCDs/add SQH  --Activity: PT/OT, OOB. C-collar  --Diet: Regular, Saline Lock IV  --Bowel regimen: PRN suppository, senna PRN  --Urinary: Voiding spontaneously  --Atelectasis ppx: Encourage IS hourly       --Labs today-normal     Dispo: DC home today with HHPTOT and DME     All of the above discussed and reviewed with Dr. Clark.        Syeda Ceja, PAIsabelC  Neurosurgery  [facility] " Surg  Syeda Ceja PA-C  Neurosurgery  [facility]

## 2023-08-02 NOTE — PLAN OF CARE
"CM spoke with pt prior to d/c - he confirms that he wants his RW delivered to his home in [place].   Pt also states he wants home health at d/c - this is a change from pt's request yesterday to get outpt Physical Therapy.       HH orders sent to various agencies in the [place], [place] area - he will recuperate with   [place]on       [address] [place]      Pt's son will transport pt to home - son has paid co-payment for RW - confirmed with Katey at [facility].      Discharging nurse to review all d/c meds/instructions      Nilsa with [facility] called - can accept pt and will see pt at granddaughter's home. Mr. Montano notified and gave "ok" to use [facility] agency.     VICKY spoke with Lauren at [facility] - advised her that pt has been accepted by another agency.        08/02/23 1118   Final Note   Assessment Type Final Discharge Note   Anticipated Discharge Disposition Home-Health   What phone number can be called within the next 1-3 days to see how you are doing after discharge? [phone]   Hospital Resources/Appts/Education Provided Appointments scheduled and added to AVS;Post-Acute resouces added to AVS   Post-Acute Status   Post-Acute Authorization Home Health   Home Health Status Referrals Sent   Discharge Delays None known at this time       "

## 2023-08-02 NOTE — PLAN OF CARE
Problem: Physical Therapy  Goal: Physical Therapy Goal  Description: Goals to be met by: 23     Patient will increase functional independence with mobility by performin. Supine to sit with Modified [place] utilizing log roll technqiue  2. Sit to stand transfer with Modified [place] with use of RW.  3. Bed to chair transfer with Modified [place] using Rolling Walker  4. Gait  x 150 feet with Modified Ter[place] using Rolling Walker.     Outcome: Ongoing, Progressing

## 2023-08-02 NOTE — PT/OT/SLP PROGRESS
Physical Therapy Treatment    Patient Name:  Corey Montano   MRN:  [id number]    Recommendations:     Discharge Recommendations:  (low intensity therapy)  Discharge Equipment Recommendations: walker, rolling  Barriers to discharge:  decreased mobility,strength and endurance    Assessment:     Corey Montano is a 67 y.o. male admitted with a medical diagnosis of Cervical myelopathy.  He presents with the following impairments/functional limitations: weakness, impaired endurance, impaired functional mobility, gait instability, impaired balance, decreased lower extremity function, decreased ROM, impaired coordination, impaired fine motor, impaired skin, orthopedic precautions,pt with good participation and will be discharged home today with HH services as per PA.    Rehab Prognosis: Good; patient would benefit from acute skilled PT services to address these deficits and reach maximum level of function.    Recent Surgery: Procedure(s) (LRB):  LAMINECTOMY, SPINE, CERVICAL, WITH FUSION (N/A) 2 Days Post-Op    Plan:     During this hospitalization, patient to be seen 5 x/week to address the identified rehab impairments via gait training, therapeutic activities, therapeutic exercises, neuromuscular re-education and progress toward the following goals:    Plan of Care Expires:  10/01/23    Subjective     Chief Complaint: n/a  Patient/Family Comments/goals: pt is pleased with his results.  Pain/Comfort:  Pain Rating 1:  (no c/o's)      Objective:     Communicated with nsg prior to session.  Patient found up in chair with KARLA drain, cervical collar, peripheral IV upon PT entry to room.     General Precautions: Standard, fall  Orthopedic Precautions: spinal precautions  Braces: Aspen collar  Respiratory Status: Room air     Functional Mobility:  Transfers:     Sit to Stand:  stand by assistance with rolling walker  Gait: amb ~350' with RW and S X 2 standing rests with Aspen collar donned  Balance: fair standing balance with  RW      AM-PAC 6 CLICK MOBILITY  Turning over in bed (including adjusting bedclothes, sheets and blankets)?: 3  Sitting down on and standing up from a chair with arms (e.g., wheelchair, bedside commode, etc.): 3  Moving from lying on back to sitting on the side of the bed?: 3  Moving to and from a bed to a chair (including a wheelchair)?: 3  Need to walk in hospital room?: 3  Climbing 3-5 steps with a railing?: 1  Basic Mobility Total Score: 16       Treatment & Education: reviewed pt safety and answered questions/concerns.      Patient left up in chair with all lines intact, call button in reach, nsg notified, and son present..    GOALS:   Multidisciplinary Problems       Physical Therapy Goals          Problem: Physical Therapy    Goal Priority Disciplines Outcome Goal Variances Interventions   Physical Therapy Goal     PT, PT/OT Ongoing, Progressing     Description: Goals to be met by: 23     Patient will increase functional independence with mobility by performin. Supine to sit with Modified Saline utilizing log roll technqiue  2. Sit to stand transfer with Modified Saline with use of RW.  3. Bed to chair transfer with Modified Saline using Rolling Walker  4. Gait  x 150 feet with Modified Saline using Rolling Walker.                          Time Tracking:     PT Received On: 23  PT Start Time: 903     PT Stop Time: 933  PT Total Time (min): 30 min     Billable Minutes: Gait Training 17 and Therapeutic Activity 13    Treatment Type: Treatment  PT/PTA: PTA     Number of PTA visits since last PT visit: 2023

## 2023-08-02 NOTE — PT/OT/SLP PROGRESS
Occupational Therapy  Visit Attempt    Patient Name:  Corey Montano   MRN:  [id number]    Patient not seen today secondary to Other (Comment) (8:51 - Patient requesting to finish breakfast.).     8/2/2023

## 2023-08-02 NOTE — DISCHARGE INSTRUCTIONS
Please follow ONLY the instructions that are checked below.    Activity Restrictions:  [x]  Return to work will be determined on an individual basis.  [x]  No lifting greater than 10 pounds.  [x]  Avoid bending and twisting the area of your surgery more than 45 degrees from neutral position in any direction.  [x]  No driving or operating machinery:  [x]  until cleared by your surgeon.  [x]  while taking narcotic pain medications or muscle relaxants.  []  No cervical collar, soft collar, or lumbar brace required.  [x]  Wear your brace at all times.   []  Wear your brace at all times except when flat in bed.  []  Wear brace for comfort only.  [x]  Increase ambulation over the next 2 weeks so that you are walking 2 miles per day at 2 weeks post-operatively.  [x]  Walk on paved surfaces only. It is okay to walk up and down stairs while holding onto a side rail.  [x]  No sexual activity for 2-3 weeks.    Discharge Medication/Follow-up:  [x]  Please refer to discharge medication reconciliation form.  [x]  Do not take ANY non-steroidal anti-inflammatory drugs (NSAIDS), including the following: ibuprofen, naprosyn, Aleve, Advil, Indocin, Mobic,  []  4 weeks  []  8 weeks  [x]  6 months  [x]  Prescriptions for appropriate medication will be given upon discharge.   [x]  Pain control:             [x]  Muscle relaxer:            [x]  Take docusate (Colace 100 mg): take one capsule a day as needed for constipation. You can get this over the counter.  [x]  Follow-up appointment:  [x]  10-14 days post-op for wound check by physician assistant/nurse  [x]  4-6 weeks with MD:  [x]  with x-rays  []  without x-rays  []  An appointment will be mailed to you.    Wound Care:  []  Remove dressing or bandaid in    days.  [x]  No bandage required. Keep your incision open to the air starting tomorrow  [x]  You may shower on the 3rd day after your surgery. Have the force of water hit you opposite from the incision. Pat the incision dry after  your shower; do not scrub the incision.  []  You cannot take a bath until 8 weeks after surgery.  []  Apply bacitracin to incision twice a day for    more days.    Call your doctor or go to the Emergency Room for any signs of infection, including: increased redness, drainage, pain, or fever (temperature ?101.5 for 24 hours). Call your doctor or go to the Emergency Room if there are any localized neurological changes; problems with speech, vision, numbness, tingling, weakness, or severe headache; or for other concerns.    Special Instructions:  [x]  No use of tobacco products.  [x]  Diet: Please eat a regular diet as tolerated.  []  Other diet:              Specific physician instructions:       Resume Aspirin on Saturday            Physicians need 3 days' notice for pain medicine to be refilled. Pain medicine will only be refilled between 8 AM and 5 PM, Monday through Friday, due to Food and Drug Administration regulation of documentation.          Form No. 40190 (Revised 10/31/2013)

## 2023-08-02 NOTE — PT/OT/SLP PROGRESS
"Occupational Therapy   Treatment    Name: Corey Montano  MRN: [id number]  Admitting Diagnosis:  Cervical myelopathy  2 Days Post-Op    Recommendations:     Discharge Recommendations: home health PT, home health OT (low intensity therapy)  Discharge Equipment Recommendations:  walker, rolling  Barriers to discharge:  None    Assessment:     Corey Montano is a 67 y.o. male with a medical diagnosis of Cervical myelopathy.  Performance deficits affecting function are weakness, impaired endurance, impaired self care skills, impaired functional mobility, impaired balance, gait instability, pain, decreased lower extremity function, decreased ROM, orthopedic precautions.     Rehab Prognosis:  Good; patient would benefit from acute skilled OT services to address these deficits and reach maximum level of function.       Plan:     Patient to be seen 5 x/week to address the above listed problems via self-care/home management, therapeutic activities, therapeutic exercises  Plan of Care Expires: 09/01/23  Plan of Care Reviewed with: patient    Subjective     Chief Complaint: "I'm feeling pretty good"  Patient/Family Comments/goals: return to PLOF  Pain/Comfort:  Pain Rating 1: 3/10  Location 1: cervical spine  Pain Addressed 1: Distraction, Cessation of Activity, Nurse notified    Objective:     Communicated with: nurseEveline prior to session.  Patient found up in chair with cervical collar upon OT entry to room.    General Precautions: Standard, fall    Orthopedic Precautions:spinal precautions  Braces: Aspen collar  Respiratory Status: Room air    [facility] 6 Click ADL: 17    Treatment & Education:  -Educated on purpose/role of OT  -Issued handout/education on:   -Spinal precautions   -Log-roll method   -ADL modifications as needed  -Discussed DME, home safety  -All questions answered within OT scope of practice    Patient left up in chair with all lines intact, call button in reach, and nsg notified    GOALS:   Multidisciplinary " Problems       Occupational Therapy Goals          Problem: Occupational Therapy    Goal Priority Disciplines Outcome Interventions   Occupational Therapy Goal     OT, PT/OT Ongoing, Progressing    Description: Goals to be met by: 9/1/23     Patient will increase functional independence with ADLs by performing:    LE Dressing with Modified [place] and Assistive Devices as needed.  Grooming while standing with Modified [place].  Toileting from toilet with Modified [place] for hygiene and clothing management.   Supine to sit with Modified [place].  Step transfer with Modified [place]  Toilet transfer to toilet with Modified [place].  Increased functional strength to WFL for self care skills and functional mobility.  Upper extremity exercise program x10 reps per handout, with independence.                         Time Tracking:     OT Date of Treatment: 08/02/23  OT Start Time: 1049  OT Stop Time: 1102  OT Total Time (min): 13 min    Billable Minutes:Therapeutic Activity 13    OT/TREVOR: TREVOR     Number of TREVOR visits since last OT visit: 1 8/2/2023

## 2023-08-02 NOTE — TELEPHONE ENCOUNTER
I sent this to our staff to be done.  Please fu on this and make sure he gets a 2 week and 6 week fu with me and a 3 month fu with Clark for post op cervical spinal fusion.  Needs xrays at 6 weeks and 3 months.     Make afternoon appointments.    Thanks,  Syeda Ceja, [facility], PA-C  Neurosurgery  [facility]  08/02/2023

## 2023-08-02 NOTE — NURSING
VN cued into room and permission given to adjust the camera towards patient.  Patient is sitting up in chair waiting for lunch tray.  AVS reviewed with patient and he verbalized complete understanding on the discharge instructions.  Patient received medications from Outpatient Pharmacy.  Awaiting for son for .  Instructed patient to call for wheelchair per Transport when he is ready to leave.  Voiced no other concerns.

## 2023-08-03 NOTE — DISCHARGE SUMMARY
[facility] Surg  Neurosurgery  Discharge Summary      Patient Name: Corey Montano  MRN: [id number]  Admission Date: 7/31/2023  Hospital Length of Stay: 2 days  Discharge Date and Time: 8/2/2023  1:36 PM  Attending Physician: Steven Clark MD  Discharging Provider: Syeda Ceja PA-C  Primary Care Provider: Jere Suazo FNP     HPI:   67-year-old man presented to the clinic last week with lumbar MRI and weakness involving all 4 extremities with increased reflexes.  Urgent MRI was obtained which showed severe cervical stenosis with myelopathy changes.  Urgent decompression was recommended from posterior from C3-6.         Procedure(s) (LRB):  LAMINECTOMY, SPINE, CERVICAL, WITH FUSION (N/A)     Hospital Course:  Patient had the above-named procedure.  Postoperatively he had some neck pain to the left shoulder.  Mild left deltoid weakness 4/5.  His hand symptoms improved over his hospital stay.  They were less numb and less tight.  He did well with physical therapy.  X-rays showed good hardware placement.  Labs were stable.  He was discharged home in the drain discontinued on postop day 2 in stable condition.  He will follow-up in 2 weeks for wound check.      Pending Diagnostic Studies:       None          Final Active Diagnoses:    Diagnosis Date Noted POA    PRINCIPAL PROBLEM:  Cervical myelopathy [G95.9] 08/02/2023 Yes      Problems Resolved During this Admission:      Discharged Condition: good    Disposition: Home-Health Care [facility]    Follow Up:   Follow-up Information       [facility].    Specialty: DME Provider  Why: your rolling walker will be delivered to you home in [place].  Contact information:  [address]  [place] [address]  [phone]               Steven Clark MD Follow up.    Specialty: Neurosurgery  Why: YOU WILL BE CONTACTED WITH A HOSPITAL FOLLOW UP APT  Contact information:  [address]  [address]  [place] [address] [phone]               [facility] - New Follow up.    Specialty: Home Health Services  Why: The nurse will call you before coming to visit you at your granddaughter's home.  Contact information:  [address]  [place] [address]  [phone]                           Patient Instructions:      WALKER FOR HOME USE     Order Specific Question Answer Comments   Type of Walker: Adult (5'4"-6'6")    With wheels? Yes    Height: 6' 2" (1.88 m)    Weight: 118.8 kg (262 lb)    Length of need (1-99 months): 99    Does patient have medical equipment at home? walker, rolling    Does patient have medical equipment at home? rollator    Please check all that apply: Patient's condition impairs ambulation.    Please check all that apply: Patient is unable to safely ambulate without equipment.      Medications:  Reconciled Home Medications:      Medication List        START taking these medications      methocarbamoL 750 MG Tab  Commonly known as: ROBAXIN  Take 1 tablet (750 mg total) by mouth every 8 (eight) hours as needed (muscle spasms).     oxyCODONE-acetaminophen  mg per tablet  Commonly known as: PERCOCET  Take 1 tablet by mouth every 6 (six) hours as needed for Pain.            CONTINUE taking these medications      atorvastatin 10 MG tablet  Commonly known as: LIPITOR  Take 1 tablet (10 mg total) by mouth every evening.     gabapentin 300 MG capsule  Commonly known as: NEURONTIN  Take 1 capsule (300 mg total) by mouth 3 (three) times daily.     hydroCHLOROthiazide 25 MG tablet  Commonly known as: HYDRODIURIL  Take 1 tablet (25 mg total) by mouth once daily.     lisinopriL 40 MG tablet  Commonly known as: PRINIVIL,ZESTRIL  Take 1 tablet (40 mg total) by mouth once daily.     metoprolol succinate 50 MG 24 hr tablet  Commonly known as: TOPROL-XL  Take 1 tablet (50 mg total) by mouth once daily.     tadalafiL 10 MG tablet  Commonly known as: CIALIS  Take 1 tablet (10 mg total) by mouth daily as needed for Erectile " Dysfunction.            STOP taking these medications      diclofenac 75 MG EC tablet  Commonly known as: AARON Ceja PA-C  Neurosurgery  [facility] Surg

## 2023-08-04 ENCOUNTER — PATIENT OUTREACH (OUTPATIENT)
Dept: ADMINISTRATIVE | Facility: CLINIC | Age: 68
End: 2023-08-04
Payer: MEDICARE

## 2023-08-04 NOTE — PROGRESS NOTES
C3 nurse spoke with Corey Montano for a TCC post hospital discharge follow up call. The patient does not have a scheduled HOSFU appointment with Jere Suazo FNP within 5-7 days post hospital discharge date 8/2/23. C3 nurse was unable to schedule HOSFU appointment in [facility].    Message sent to PCP staff requesting they contact patient and schedule follow up appointment.

## 2023-08-07 ENCOUNTER — TELEPHONE (OUTPATIENT)
Dept: ADMINISTRATIVE | Facility: CLINIC | Age: 68
End: 2023-08-07
Payer: MEDICARE

## 2023-08-09 ENCOUNTER — TELEPHONE (OUTPATIENT)
Dept: NEUROSURGERY | Facility: CLINIC | Age: 68
End: 2023-08-09
Payer: MEDICARE

## 2023-08-09 DIAGNOSIS — Z98.1 S/P CERVICAL SPINAL FUSION: Primary | ICD-10-CM

## 2023-08-14 ENCOUNTER — OFFICE VISIT (OUTPATIENT)
Dept: INTERNAL MEDICINE | Facility: CLINIC | Age: 68
End: 2023-08-14
Payer: MEDICARE

## 2023-08-14 ENCOUNTER — TELEPHONE (OUTPATIENT)
Dept: NEUROSURGERY | Facility: CLINIC | Age: 68
End: 2023-08-14
Payer: MEDICARE

## 2023-08-14 VITALS
DIASTOLIC BLOOD PRESSURE: 87 MMHG | TEMPERATURE: 97 F | SYSTOLIC BLOOD PRESSURE: 136 MMHG | WEIGHT: 257.19 LBS | RESPIRATION RATE: 20 BRPM | HEART RATE: 74 BPM | HEIGHT: 74 IN | BODY MASS INDEX: 33.01 KG/M2

## 2023-08-14 DIAGNOSIS — G95.9 CERVICAL MYELOPATHY: ICD-10-CM

## 2023-08-14 DIAGNOSIS — I10 HYPERTENSION, UNSPECIFIED TYPE: Primary | ICD-10-CM

## 2023-08-14 PROCEDURE — 3079F DIAST BP 80-89 MM HG: CPT | Mod: CPTII,,, | Performed by: NURSE PRACTITIONER

## 2023-08-14 PROCEDURE — 3079F PR MOST RECENT DIASTOLIC BLOOD PRESSURE 80-89 MM HG: ICD-10-PCS | Mod: CPTII,,, | Performed by: NURSE PRACTITIONER

## 2023-08-14 PROCEDURE — 4010F ACE/ARB THERAPY RXD/TAKEN: CPT | Mod: CPTII,,, | Performed by: NURSE PRACTITIONER

## 2023-08-14 PROCEDURE — 3008F BODY MASS INDEX DOCD: CPT | Mod: CPTII,,, | Performed by: NURSE PRACTITIONER

## 2023-08-14 PROCEDURE — 99214 OFFICE O/P EST MOD 30 MIN: CPT | Mod: S$PBB,,, | Performed by: NURSE PRACTITIONER

## 2023-08-14 PROCEDURE — 1101F PR PT FALLS ASSESS DOC 0-1 FALLS W/OUT INJ PAST YR: ICD-10-PCS | Mod: CPTII,,, | Performed by: NURSE PRACTITIONER

## 2023-08-14 PROCEDURE — 1111F DSCHRG MED/CURRENT MED MERGE: CPT | Mod: CPTII,,, | Performed by: NURSE PRACTITIONER

## 2023-08-14 PROCEDURE — 1159F MED LIST DOCD IN RCRD: CPT | Mod: CPTII,,, | Performed by: NURSE PRACTITIONER

## 2023-08-14 PROCEDURE — 99214 OFFICE O/P EST MOD 30 MIN: CPT | Mod: PBBFAC | Performed by: NURSE PRACTITIONER

## 2023-08-14 PROCEDURE — 3075F SYST BP GE 130 - 139MM HG: CPT | Mod: CPTII,,, | Performed by: NURSE PRACTITIONER

## 2023-08-14 PROCEDURE — 3044F HG A1C LEVEL LT 7.0%: CPT | Mod: CPTII,,, | Performed by: NURSE PRACTITIONER

## 2023-08-14 PROCEDURE — 99214 PR OFFICE/OUTPT VISIT, EST, LEVL IV, 30-39 MIN: ICD-10-PCS | Mod: S$PBB,,, | Performed by: NURSE PRACTITIONER

## 2023-08-14 PROCEDURE — 3288F PR FALLS RISK ASSESSMENT DOCUMENTED: ICD-10-PCS | Mod: CPTII,,, | Performed by: NURSE PRACTITIONER

## 2023-08-14 PROCEDURE — 1160F PR REVIEW ALL MEDS BY PRESCRIBER/CLIN PHARMACIST DOCUMENTED: ICD-10-PCS | Mod: CPTII,,, | Performed by: NURSE PRACTITIONER

## 2023-08-14 PROCEDURE — 1126F PR PAIN SEVERITY QUANTIFIED, NO PAIN PRESENT: ICD-10-PCS | Mod: CPTII,,, | Performed by: NURSE PRACTITIONER

## 2023-08-14 PROCEDURE — 3075F PR MOST RECENT SYSTOLIC BLOOD PRESS GE 130-139MM HG: ICD-10-PCS | Mod: CPTII,,, | Performed by: NURSE PRACTITIONER

## 2023-08-14 PROCEDURE — 4010F PR ACE/ARB THEARPY RXD/TAKEN: ICD-10-PCS | Mod: CPTII,,, | Performed by: NURSE PRACTITIONER

## 2023-08-14 PROCEDURE — 1111F PR DISCHARGE MEDS RECONCILED W/ CURRENT OUTPATIENT MED LIST: ICD-10-PCS | Mod: CPTII,,, | Performed by: NURSE PRACTITIONER

## 2023-08-14 PROCEDURE — 1101F PT FALLS ASSESS-DOCD LE1/YR: CPT | Mod: CPTII,,, | Performed by: NURSE PRACTITIONER

## 2023-08-14 PROCEDURE — 1159F PR MEDICATION LIST DOCUMENTED IN MEDICAL RECORD: ICD-10-PCS | Mod: CPTII,,, | Performed by: NURSE PRACTITIONER

## 2023-08-14 PROCEDURE — 3288F FALL RISK ASSESSMENT DOCD: CPT | Mod: CPTII,,, | Performed by: NURSE PRACTITIONER

## 2023-08-14 PROCEDURE — 1160F RVW MEDS BY RX/DR IN RCRD: CPT | Mod: CPTII,,, | Performed by: NURSE PRACTITIONER

## 2023-08-14 PROCEDURE — 3044F PR MOST RECENT HEMOGLOBIN A1C LEVEL <7.0%: ICD-10-PCS | Mod: CPTII,,, | Performed by: NURSE PRACTITIONER

## 2023-08-14 PROCEDURE — 1126F AMNT PAIN NOTED NONE PRSNT: CPT | Mod: CPTII,,, | Performed by: NURSE PRACTITIONER

## 2023-08-14 PROCEDURE — 3008F PR BODY MASS INDEX (BMI) DOCUMENTED: ICD-10-PCS | Mod: CPTII,,, | Performed by: NURSE PRACTITIONER

## 2023-08-14 RX ORDER — DOCUSATE SODIUM 100 MG/1
100 CAPSULE, LIQUID FILLED ORAL 2 TIMES DAILY
COMMUNITY
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
## 2023-08-14 NOTE — TELEPHONE ENCOUNTER
Returned pt's call, pt stated that he needs an appointment made to get his staples removed. I stated to pt that he had an appointment scheduled for tomorrow but it was cancelled on his end. Pt stated that he can't make the appointment tomorrow due to him staying in [place]. I stated to pt that the staples have to be taken out sometime this week. Pt is requesting his 2 week post-op appointment to be rescheduled to Thursday. I rescheduled pt's appointment to Thursday, August 17 at 2:00 pm. Pt voiced understanding
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
## 2023-08-17 ENCOUNTER — OFFICE VISIT (OUTPATIENT)
Dept: NEUROSURGERY | Facility: CLINIC | Age: 68
End: 2023-08-17
Payer: MEDICARE

## 2023-08-17 VITALS
WEIGHT: 257.25 LBS | DIASTOLIC BLOOD PRESSURE: 82 MMHG | BODY MASS INDEX: 33.02 KG/M2 | HEIGHT: 74 IN | HEART RATE: 82 BPM | SYSTOLIC BLOOD PRESSURE: 137 MMHG | TEMPERATURE: 98 F

## 2023-08-17 DIAGNOSIS — Z98.1 S/P CERVICAL SPINAL FUSION: Primary | ICD-10-CM

## 2023-08-17 PROCEDURE — 3079F DIAST BP 80-89 MM HG: CPT | Mod: CPTII,S$GLB,, | Performed by: PHYSICIAN ASSISTANT

## 2023-08-17 PROCEDURE — 99999 PR PBB SHADOW E&M-EST. PATIENT-LVL IV: ICD-10-PCS | Mod: PBBFAC,,, | Performed by: PHYSICIAN ASSISTANT

## 2023-08-17 PROCEDURE — 3008F BODY MASS INDEX DOCD: CPT | Mod: CPTII,S$GLB,, | Performed by: PHYSICIAN ASSISTANT

## 2023-08-17 PROCEDURE — 3079F PR MOST RECENT DIASTOLIC BLOOD PRESSURE 80-89 MM HG: ICD-10-PCS | Mod: CPTII,S$GLB,, | Performed by: PHYSICIAN ASSISTANT

## 2023-08-17 PROCEDURE — 1159F PR MEDICATION LIST DOCUMENTED IN MEDICAL RECORD: ICD-10-PCS | Mod: CPTII,S$GLB,, | Performed by: PHYSICIAN ASSISTANT

## 2023-08-17 PROCEDURE — 3075F SYST BP GE 130 - 139MM HG: CPT | Mod: CPTII,S$GLB,, | Performed by: PHYSICIAN ASSISTANT

## 2023-08-17 PROCEDURE — 3288F PR FALLS RISK ASSESSMENT DOCUMENTED: ICD-10-PCS | Mod: CPTII,S$GLB,, | Performed by: PHYSICIAN ASSISTANT

## 2023-08-17 PROCEDURE — 3075F PR MOST RECENT SYSTOLIC BLOOD PRESS GE 130-139MM HG: ICD-10-PCS | Mod: CPTII,S$GLB,, | Performed by: PHYSICIAN ASSISTANT

## 2023-08-17 PROCEDURE — 1126F AMNT PAIN NOTED NONE PRSNT: CPT | Mod: CPTII,S$GLB,, | Performed by: PHYSICIAN ASSISTANT

## 2023-08-17 PROCEDURE — 1160F PR REVIEW ALL MEDS BY PRESCRIBER/CLIN PHARMACIST DOCUMENTED: ICD-10-PCS | Mod: CPTII,S$GLB,, | Performed by: PHYSICIAN ASSISTANT

## 2023-08-17 PROCEDURE — 1160F RVW MEDS BY RX/DR IN RCRD: CPT | Mod: CPTII,S$GLB,, | Performed by: PHYSICIAN ASSISTANT

## 2023-08-17 PROCEDURE — 1126F PR PAIN SEVERITY QUANTIFIED, NO PAIN PRESENT: ICD-10-PCS | Mod: CPTII,S$GLB,, | Performed by: PHYSICIAN ASSISTANT

## 2023-08-17 PROCEDURE — 1101F PT FALLS ASSESS-DOCD LE1/YR: CPT | Mod: CPTII,S$GLB,, | Performed by: PHYSICIAN ASSISTANT

## 2023-08-17 PROCEDURE — 99999 PR PBB SHADOW E&M-EST. PATIENT-LVL IV: CPT | Mod: PBBFAC,,, | Performed by: PHYSICIAN ASSISTANT

## 2023-08-17 PROCEDURE — 4010F PR ACE/ARB THEARPY RXD/TAKEN: ICD-10-PCS | Mod: CPTII,S$GLB,, | Performed by: PHYSICIAN ASSISTANT

## 2023-08-17 PROCEDURE — 99024 POSTOP FOLLOW-UP VISIT: CPT | Mod: S$GLB,,, | Performed by: PHYSICIAN ASSISTANT

## 2023-08-17 PROCEDURE — 4010F ACE/ARB THERAPY RXD/TAKEN: CPT | Mod: CPTII,S$GLB,, | Performed by: PHYSICIAN ASSISTANT

## 2023-08-17 PROCEDURE — 1101F PR PT FALLS ASSESS DOC 0-1 FALLS W/OUT INJ PAST YR: ICD-10-PCS | Mod: CPTII,S$GLB,, | Performed by: PHYSICIAN ASSISTANT

## 2023-08-17 PROCEDURE — 99024 PR POST-OP FOLLOW-UP VISIT: ICD-10-PCS | Mod: S$GLB,,, | Performed by: PHYSICIAN ASSISTANT

## 2023-08-17 PROCEDURE — 3288F FALL RISK ASSESSMENT DOCD: CPT | Mod: CPTII,S$GLB,, | Performed by: PHYSICIAN ASSISTANT

## 2023-08-17 PROCEDURE — 3044F PR MOST RECENT HEMOGLOBIN A1C LEVEL <7.0%: ICD-10-PCS | Mod: CPTII,S$GLB,, | Performed by: PHYSICIAN ASSISTANT

## 2023-08-17 PROCEDURE — 3044F HG A1C LEVEL LT 7.0%: CPT | Mod: CPTII,S$GLB,, | Performed by: PHYSICIAN ASSISTANT

## 2023-08-17 PROCEDURE — 1159F MED LIST DOCD IN RCRD: CPT | Mod: CPTII,S$GLB,, | Performed by: PHYSICIAN ASSISTANT

## 2023-08-17 PROCEDURE — 3008F PR BODY MASS INDEX (BMI) DOCUMENTED: ICD-10-PCS | Mod: CPTII,S$GLB,, | Performed by: PHYSICIAN ASSISTANT

## 2023-08-17 RX ORDER — METHOCARBAMOL 750 MG/1
750 TABLET, FILM COATED ORAL EVERY 8 HOURS PRN
Qty: 60 TABLET | Refills: 1 | Status: SHIPPED | OUTPATIENT
Start: 2023-08-17 | End: 2024-03-10

## 2023-08-17 NOTE — PROGRESS NOTES
"Postoperative Wound Check:    Date of Procedure: 7/31/2023      Pre-Operative Diagnosis: Cervical myelopathy [G95.9]     Post-Operative Diagnosis: Post-Op Diagnosis Codes:     * Cervical myelopathy [G95.9]     Anesthesia: General     Procedures performed:  C3 through 6 laminectomy with medial facetectomy and posterior fusion.     Surgeon: Steven Clark MD     Assistant: Carmine Gil MD, resident    HPI:    Patient states overall he is doing well.  He has mild neck discomfort.  He has some paresthesias in his arms and hands that is getting less and less over time.  He is walking with a walker but is balance and gait is getting better. He takes robaxin as needed and pain medication at night.    Patient denies any problems with the incisions.  No redness, swelling, or drainage, and no fever, chills, or sweats.      Physical Exam:    Vitals:    08/17/23 1348   BP: 137/82   Pulse: 82   Temp: 98.4 °F (36.9 °C)   Weight: 116.7 kg (257 lb 4.4 oz)   Height: 6' 2" (1.88 m)   PainSc: 0-No pain         Incision:  Wound edges are approximated.  No redness, swelling, or drainage.      Diagnosis:     1. S/P cervical spinal fusion              Plan:       Orders Placed This Encounter    Ambulatory referral/consult to Physical/Occupational Therapy    methocarbamoL (ROBAXIN) 750 MG Tab         -Medical Assistant removed the staples without any complications.  Chloraprep applied.  -  Will order physical therapy to be done outside of [facility] for gait training and upper and lower extremity exercises  -Robaxin refilled        The patient will follow up with me in 4 weeks for the 6 week po fu with xrays beforehand.    Syeda Ceja, [facility], PA-C  Neurosurgery  [facility]        "

## 2023-08-24 ENCOUNTER — PATIENT OUTREACH (OUTPATIENT)
Dept: ADMINISTRATIVE | Facility: OTHER | Age: 68
End: 2023-08-24
Payer: MEDICARE

## 2023-08-24 NOTE — PROGRESS NOTES
CHW - Follow Up and Case Closure    This Community Health Worker completed a follow up visit with patient via telephone today.  Pt reported: all good, needs a resource for PT rehab.  [facility]  Pt expressed familiarity and recalled another place to look into.   Pt denied any additional needs at this time and agrees with episode closure at this time.  Provided patient with Community Health Worker's contact information and encouraged him to contact this Community Health Worker if additional needs arise.

## 2023-09-11 PROBLEM — Z00.00 WELLNESS EXAMINATION: Status: RESOLVED | Noted: 2022-06-10 | Resolved: 2023-09-11

## 2023-09-15 ENCOUNTER — HOSPITAL ENCOUNTER (OUTPATIENT)
Dept: RADIOLOGY | Facility: HOSPITAL | Age: 68
Discharge: HOME | End: 2023-09-15
Attending: NEUROLOGICAL SURGERY
Payer: MEDICARE

## 2023-09-15 ENCOUNTER — OFFICE VISIT (OUTPATIENT)
Dept: NEUROSURGERY | Facility: CLINIC | Age: 68
End: 2023-09-15
Payer: MEDICARE

## 2023-09-15 VITALS
DIASTOLIC BLOOD PRESSURE: 78 MMHG | HEART RATE: 70 BPM | SYSTOLIC BLOOD PRESSURE: 149 MMHG | BODY MASS INDEX: 33.02 KG/M2 | HEIGHT: 74 IN | WEIGHT: 257.25 LBS

## 2023-09-15 DIAGNOSIS — Z98.1 S/P CERVICAL SPINAL FUSION: Primary | ICD-10-CM

## 2023-09-15 DIAGNOSIS — Z98.1 S/P CERVICAL SPINAL FUSION: ICD-10-CM

## 2023-09-15 PROCEDURE — 1160F PR REVIEW ALL MEDS BY PRESCRIBER/CLIN PHARMACIST DOCUMENTED: ICD-10-PCS | Mod: CPTII,S$GLB,, | Performed by: PHYSICIAN ASSISTANT

## 2023-09-15 PROCEDURE — 3008F BODY MASS INDEX DOCD: CPT | Mod: CPTII,S$GLB,, | Performed by: PHYSICIAN ASSISTANT

## 2023-09-15 PROCEDURE — 3288F FALL RISK ASSESSMENT DOCD: CPT | Mod: CPTII,S$GLB,, | Performed by: PHYSICIAN ASSISTANT

## 2023-09-15 PROCEDURE — 3077F SYST BP >= 140 MM HG: CPT | Mod: CPTII,S$GLB,, | Performed by: PHYSICIAN ASSISTANT

## 2023-09-15 PROCEDURE — 3044F PR MOST RECENT HEMOGLOBIN A1C LEVEL <7.0%: ICD-10-PCS | Mod: CPTII,S$GLB,, | Performed by: PHYSICIAN ASSISTANT

## 2023-09-15 PROCEDURE — 99999 PR PBB SHADOW E&M-EST. PATIENT-LVL III: ICD-10-PCS | Mod: PBBFAC,,, | Performed by: PHYSICIAN ASSISTANT

## 2023-09-15 PROCEDURE — 72040 XR CERVICAL SPINE AP LATERAL: ICD-10-PCS | Mod: 26,,, | Performed by: RADIOLOGY

## 2023-09-15 PROCEDURE — 99999 PR PBB SHADOW E&M-EST. PATIENT-LVL III: CPT | Mod: PBBFAC,,, | Performed by: PHYSICIAN ASSISTANT

## 2023-09-15 PROCEDURE — 99024 POSTOP FOLLOW-UP VISIT: CPT | Mod: S$GLB,,, | Performed by: PHYSICIAN ASSISTANT

## 2023-09-15 PROCEDURE — 1101F PR PT FALLS ASSESS DOC 0-1 FALLS W/OUT INJ PAST YR: ICD-10-PCS | Mod: CPTII,S$GLB,, | Performed by: PHYSICIAN ASSISTANT

## 2023-09-15 PROCEDURE — 3044F HG A1C LEVEL LT 7.0%: CPT | Mod: CPTII,S$GLB,, | Performed by: PHYSICIAN ASSISTANT

## 2023-09-15 PROCEDURE — 1101F PT FALLS ASSESS-DOCD LE1/YR: CPT | Mod: CPTII,S$GLB,, | Performed by: PHYSICIAN ASSISTANT

## 2023-09-15 PROCEDURE — 72040 X-RAY EXAM NECK SPINE 2-3 VW: CPT | Mod: TC,FY

## 2023-09-15 PROCEDURE — 3078F PR MOST RECENT DIASTOLIC BLOOD PRESSURE < 80 MM HG: ICD-10-PCS | Mod: CPTII,S$GLB,, | Performed by: PHYSICIAN ASSISTANT

## 2023-09-15 PROCEDURE — 3288F PR FALLS RISK ASSESSMENT DOCUMENTED: ICD-10-PCS | Mod: CPTII,S$GLB,, | Performed by: PHYSICIAN ASSISTANT

## 2023-09-15 PROCEDURE — 72040 X-RAY EXAM NECK SPINE 2-3 VW: CPT | Mod: 26,,, | Performed by: RADIOLOGY

## 2023-09-15 PROCEDURE — 3077F PR MOST RECENT SYSTOLIC BLOOD PRESSURE >= 140 MM HG: ICD-10-PCS | Mod: CPTII,S$GLB,, | Performed by: PHYSICIAN ASSISTANT

## 2023-09-15 PROCEDURE — 1125F PR PAIN SEVERITY QUANTIFIED, PAIN PRESENT: ICD-10-PCS | Mod: CPTII,S$GLB,, | Performed by: PHYSICIAN ASSISTANT

## 2023-09-15 PROCEDURE — 3008F PR BODY MASS INDEX (BMI) DOCUMENTED: ICD-10-PCS | Mod: CPTII,S$GLB,, | Performed by: PHYSICIAN ASSISTANT

## 2023-09-15 PROCEDURE — 3078F DIAST BP <80 MM HG: CPT | Mod: CPTII,S$GLB,, | Performed by: PHYSICIAN ASSISTANT

## 2023-09-15 PROCEDURE — 1159F MED LIST DOCD IN RCRD: CPT | Mod: CPTII,S$GLB,, | Performed by: PHYSICIAN ASSISTANT

## 2023-09-15 PROCEDURE — 4010F ACE/ARB THERAPY RXD/TAKEN: CPT | Mod: CPTII,S$GLB,, | Performed by: PHYSICIAN ASSISTANT

## 2023-09-15 PROCEDURE — 99024 PR POST-OP FOLLOW-UP VISIT: ICD-10-PCS | Mod: S$GLB,,, | Performed by: PHYSICIAN ASSISTANT

## 2023-09-15 PROCEDURE — 1159F PR MEDICATION LIST DOCUMENTED IN MEDICAL RECORD: ICD-10-PCS | Mod: CPTII,S$GLB,, | Performed by: PHYSICIAN ASSISTANT

## 2023-09-15 PROCEDURE — 4010F PR ACE/ARB THEARPY RXD/TAKEN: ICD-10-PCS | Mod: CPTII,S$GLB,, | Performed by: PHYSICIAN ASSISTANT

## 2023-09-15 PROCEDURE — 1125F AMNT PAIN NOTED PAIN PRSNT: CPT | Mod: CPTII,S$GLB,, | Performed by: PHYSICIAN ASSISTANT

## 2023-09-15 PROCEDURE — 1160F RVW MEDS BY RX/DR IN RCRD: CPT | Mod: CPTII,S$GLB,, | Performed by: PHYSICIAN ASSISTANT

## 2023-09-15 RX ORDER — OXYCODONE AND ACETAMINOPHEN 10; 325 MG/1; MG/1
1 TABLET ORAL EVERY 8 HOURS PRN
Qty: 40 TABLET | Refills: 0 | Status: SHIPPED | OUTPATIENT
Start: 2023-09-15 | End: 2023-11-13

## 2023-09-15 NOTE — PROGRESS NOTES
"Subjective:     Patient ID:  Corey Montano is a 67 y.o. male.    Amber    Chief Complaint: 6 week po fu       Date of Procedure: 7/31/2023      Pre-Operative Diagnosis: Cervical myelopathy [G95.9]     Post-Operative Diagnosis: Post-Op Diagnosis Codes:     * Cervical myelopathy [G95.9]     Anesthesia: General     Procedures performed:  C3 through 6 laminectomy with medial facetectomy and posterior fusion.     Surgeon: Steven Clark MD     Assistant: Carmine Gil MD, resident    HPI    Corey Montano is a 67 y.o. male who presents for follow up.  Overall he is doing better.  He denies any pain in his neck or arms.  No arm paresthesias.  The paresthesias he had in his hands and feet are gone.  He has been doing physical therapy and feels like his balance and gait are getting better but he is still using a Rollator.  He is not dropping things as much as he used to.  He does have some low back pain.    He takes Tylenol as needed and gabapentin twice a day.      He states he has been wearing his brace at nighttime and occasionally during the day.  He thought he was able to take his brace off.    Patient denies any recent accidents or trauma, no saddle anesthesias, and no bowel or bladder incontinence.    Review of Systems:  Constitution: Negative for chills, fever, night sweats and weight loss.   Musculoskeletal: Negative for falls.   Gastrointestinal: Negative for bowel incontinence, nausea and vomiting.   Genitourinary: Negative for bladder incontinence.   Neurological: Negative for disturbances in coordination and loss of balance.      Objective:      Vitals:    09/15/23 0955   BP: (!) 149/78   Pulse: 70   Weight: 116.7 kg (257 lb 4.4 oz)   Height: 6' 2" (1.88 m)   PainSc:   3   PainLoc: Neck         Physical Exam:      General:  Corey Montano is well-developed, well-nourished, appears stated age, in no acute distress, alert and oriented to person, place, and time.    Pulmonary/Chest:  Respiratory effort " normal  Abdominal: Exhibits no distension  Psychiatric:  Normal mood and affect.  Behavior is normal.  Judgement and thought content normal      Musculoskeletal:    Patient still with gait abnormality and off balance    Cervical Spine Inspection:  Healed posterior surgical scars with no visible rashes.    Cervical Spine Palpation:  No tenderness to cervical spine palpation.      Neurological:    Muscle strength against resistance:     Right Left   Deltoid  5 / 5 54/ 5   Biceps  5 / 5 4/  5   Triceps 5 / 5 5 / 5   Wrist flexion  5 / 5 5 / 5   Wrist extension 5 / 5 5 / 5   Finger abduction 5 / 5 5 / 5   Thumb opposition 5 / 5 5 / 5   Handgrip 5 / 5 5 / 5   Hip flexion  5 / 5 5 / 5   Hip extension 5 / 5 5 / 5   Hip abduction 5 / 5 5 / 5   Hip adduction 5/ 5 5 / 5   Knee extension  5 / 5 5 / 5   Knee flexion  5 / 5 5 / 5   Dorsiflexion  5 / 5 5 / 5   EHL  5 / 5 5 / 5   Plantar flexion  5 / 5 5 / 5   Inversion of the feet 5 / 5 5 / 5   Eversion of the feet 5 / 5 5 / 5       Reflexes:     Right Left   Triceps 2+ 2+   Biceps 2+ 2+   Brachioradialis 2+ 2+   Patellar 3+ 3+   Achilles 3+ 3+       Clonus:  Negative on the left.  Positive on the right.  Vidal:Positive bilaterally    On gross examination of the bilateral lower extremities, patient has full painfree ROM with no signs of clubbing, cyanosis, edema, and weakness.      XRAY Interpretation:     Cervical spine xrays were personally reviewed today.  No fractures.  Hardware intact.      Assessment:          1. S/P cervical spinal fusion            Plan:          Orders Placed This Encounter    oxyCODONE-acetaminophen (PERCOCET)  mg per tablet       -Continue PT for gait and balance training.  UE and LE strengthening exercises  -Percocet PRN pain  -Continue tylenol and gabapentin  -Wear neck brace at all time except when eating, showering, and sleeping  -FU with Dr. Clark in 6 weeks with xrays for 3 month po fu    Follow-Up:  Follow up in about 6 weeks (around  10/27/2023). If there are any questions prior to this, the patient was instructed to contact the office.       KYAW Jaime, PA-C  Neurosurgery  [facility]  09/15/2023

## 2023-10-03 ENCOUNTER — OFFICE VISIT (OUTPATIENT)
Dept: INTERNAL MEDICINE | Facility: CLINIC | Age: 68
End: 2023-10-03
Payer: MEDICARE

## 2023-10-03 ENCOUNTER — HOSPITAL ENCOUNTER (EMERGENCY)
Facility: HOSPITAL | Age: 68
Discharge: ANOTHER HOSPITAL | End: 2023-10-03
Attending: STUDENT IN AN ORGANIZED HEALTH CARE EDUCATION/TRAINING PROGRAM
Payer: MEDICARE

## 2023-10-03 ENCOUNTER — HOSPITAL ENCOUNTER (INPATIENT)
Facility: HOSPITAL | Age: 68
LOS: 2 days | Discharge: HOME | DRG: 244 | End: 2023-10-05
Attending: INTERNAL MEDICINE | Admitting: INTERNAL MEDICINE
Payer: MEDICARE

## 2023-10-03 VITALS
RESPIRATION RATE: 20 BRPM | SYSTOLIC BLOOD PRESSURE: 123 MMHG | HEIGHT: 74 IN | HEART RATE: 33 BPM | DIASTOLIC BLOOD PRESSURE: 51 MMHG | TEMPERATURE: 98 F | WEIGHT: 259 LBS | BODY MASS INDEX: 33.24 KG/M2

## 2023-10-03 VITALS
HEIGHT: 74 IN | SYSTOLIC BLOOD PRESSURE: 144 MMHG | OXYGEN SATURATION: 95 % | TEMPERATURE: 98 F | HEART RATE: 33 BPM | RESPIRATION RATE: 20 BRPM | DIASTOLIC BLOOD PRESSURE: 71 MMHG | BODY MASS INDEX: 32.98 KG/M2 | WEIGHT: 257 LBS

## 2023-10-03 DIAGNOSIS — R00.1 BRADYCARDIA: ICD-10-CM

## 2023-10-03 DIAGNOSIS — I49.9 ARRHYTHMIA: ICD-10-CM

## 2023-10-03 DIAGNOSIS — R00.1 BRADYCARDIA: Primary | ICD-10-CM

## 2023-10-03 DIAGNOSIS — I44.1 MOBITZ TYPE 2 SECOND DEGREE AV BLOCK: Primary | ICD-10-CM

## 2023-10-03 DIAGNOSIS — R53.1 WEAKNESS: ICD-10-CM

## 2023-10-03 DIAGNOSIS — I44.30 AV BLOCK: Primary | ICD-10-CM

## 2023-10-03 LAB
ALBUMIN SERPL-MCNC: 4 G/DL (ref 3.4–4.8)
ALBUMIN/GLOB SERPL: 1.1 RATIO (ref 1.1–2)
ALP SERPL-CCNC: 100 UNIT/L (ref 40–150)
ALT SERPL-CCNC: 40 UNIT/L (ref 0–55)
AST SERPL-CCNC: 26 UNIT/L (ref 5–34)
AV INDEX (PROSTH): 0.53
AV MEAN GRADIENT: 5 MMHG
AV PEAK GRADIENT: 11 MMHG
AV VALVE AREA BY VELOCITY RATIO: 2.66 CM²
AV VALVE AREA: 2.35 CM²
AV VELOCITY RATIO: 0.59
BASOPHILS # BLD AUTO: 0.07 X10(3)/MCL
BASOPHILS NFR BLD AUTO: 0.9 %
BILIRUB SERPL-MCNC: 0.9 MG/DL
BSA FOR ECHO PROCEDURE: 2.47 M2
BUN SERPL-MCNC: 20.4 MG/DL (ref 8.4–25.7)
CALCIUM SERPL-MCNC: 10 MG/DL (ref 8.8–10)
CHLORIDE SERPL-SCNC: 104 MMOL/L (ref 98–107)
CO2 SERPL-SCNC: 23 MMOL/L (ref 23–31)
CREAT SERPL-MCNC: 0.93 MG/DL (ref 0.73–1.18)
CV ECHO LV RWT: 0.4 CM
DOP CALC AO PEAK VEL: 1.67 M/S
DOP CALC AO VTI: 41.9 CM
DOP CALC LVOT AREA: 4.5 CM2
DOP CALC LVOT DIAMETER: 2.39 CM
DOP CALC LVOT PEAK VEL: 0.99 M/S
DOP CALC LVOT STROKE VOLUME: 98.65 CM3
DOP CALC MV VTI: 31.2 CM
DOP CALCLVOT PEAK VEL VTI: 22 CM
E WAVE DECELERATION TIME: 196.87 MSEC
E/A RATIO: 1.16
E/E' RATIO: 12.71 M/S
ECHO LV POSTERIOR WALL: 1.09 CM (ref 0.6–1.1)
EOSINOPHIL # BLD AUTO: 0.16 X10(3)/MCL (ref 0–0.9)
EOSINOPHIL NFR BLD AUTO: 1.9 %
ERYTHROCYTE [DISTWIDTH] IN BLOOD BY AUTOMATED COUNT: 13.3 % (ref 11.5–17)
FRACTIONAL SHORTENING: 38 % (ref 28–44)
GFR SERPLBLD CREATININE-BSD FMLA CKD-EPI: >60 MLS/MIN/1.73/M2
GLOBULIN SER-MCNC: 3.5 GM/DL (ref 2.4–3.5)
GLUCOSE SERPL-MCNC: 105 MG/DL (ref 82–115)
HCT VFR BLD AUTO: 46.3 % (ref 42–52)
HGB BLD-MCNC: 15.3 G/DL (ref 14–18)
IMM GRANULOCYTES # BLD AUTO: 0.03 X10(3)/MCL (ref 0–0.04)
IMM GRANULOCYTES NFR BLD AUTO: 0.4 %
INTERVENTRICULAR SEPTUM: 1.02 CM (ref 0.6–1.1)
LEFT ATRIUM SIZE: 4.26 CM
LEFT INTERNAL DIMENSION IN SYSTOLE: 3.4 CM (ref 2.1–4)
LEFT VENTRICLE DIASTOLIC VOLUME INDEX: 60.59 ML/M2
LEFT VENTRICLE DIASTOLIC VOLUME: 146.62 ML
LEFT VENTRICLE MASS INDEX: 94 G/M2
LEFT VENTRICLE SYSTOLIC VOLUME INDEX: 19.7 ML/M2
LEFT VENTRICLE SYSTOLIC VOLUME: 47.56 ML
LEFT VENTRICULAR INTERNAL DIMENSION IN DIASTOLE: 5.49 CM (ref 3.5–6)
LEFT VENTRICULAR MASS: 228.16 G
LV LATERAL E/E' RATIO: 12.71 M/S
LV SEPTAL E/E' RATIO: 12.71 M/S
LVOT MG: 1.99 MMHG
LVOT MV: 0.66 CM/S
LYMPHOCYTES # BLD AUTO: 2.68 X10(3)/MCL (ref 0.6–4.6)
LYMPHOCYTES NFR BLD AUTO: 32.6 %
MAGNESIUM SERPL-MCNC: 2.3 MG/DL (ref 1.6–2.6)
MCH RBC QN AUTO: 31.2 PG (ref 27–31)
MCHC RBC AUTO-ENTMCNC: 33 G/DL (ref 33–36)
MCV RBC AUTO: 94.5 FL (ref 80–94)
MONOCYTES # BLD AUTO: 0.92 X10(3)/MCL (ref 0.1–1.3)
MONOCYTES NFR BLD AUTO: 11.2 %
MV MEAN GRADIENT: 2 MMHG
MV PEAK A VEL: 0.77 M/S
MV PEAK E VEL: 0.89 M/S
MV PEAK GRADIENT: 5 MMHG
MV STENOSIS PRESSURE HALF TIME: 57.09 MS
MV VALVE AREA BY CONTINUITY EQUATION: 3.16 CM2
MV VALVE AREA P 1/2 METHOD: 3.85 CM2
NEUTROPHILS # BLD AUTO: 4.37 X10(3)/MCL (ref 2.1–9.2)
NEUTROPHILS NFR BLD AUTO: 53 %
NRBC BLD AUTO-RTO: 0 %
PISA TR MAX VEL: 2.06 M/S
PLATELET # BLD AUTO: 234 X10(3)/MCL (ref 130–400)
PMV BLD AUTO: 13 FL (ref 7.4–10.4)
POTASSIUM SERPL-SCNC: 4.2 MMOL/L (ref 3.5–5.1)
PROT SERPL-MCNC: 7.5 GM/DL (ref 5.8–7.6)
RA PRESSURE ESTIMATED: 3 MMHG
RBC # BLD AUTO: 4.9 X10(6)/MCL (ref 4.7–6.1)
RIGHT VENTRICULAR END-DIASTOLIC DIMENSION: 3.41 CM
RV TB RVSP: 5 MMHG
SODIUM SERPL-SCNC: 139 MMOL/L (ref 136–145)
TDI LATERAL: 0.07 M/S
TDI SEPTAL: 0.07 M/S
TDI: 0.07 M/S
TR MAX PG: 17 MMHG
TROPONIN I SERPL-MCNC: 0.02 NG/ML (ref 0–0.04)
TSH SERPL-ACNC: 1.79 UIU/ML (ref 0.35–4.94)
TV REST PULMONARY ARTERY PRESSURE: 20 MMHG
WBC # SPEC AUTO: 8.23 X10(3)/MCL (ref 4.5–11.5)
Z-SCORE OF LEFT VENTRICULAR DIMENSION IN END DIASTOLE: -7.34
Z-SCORE OF LEFT VENTRICULAR DIMENSION IN END SYSTOLE: -5.52

## 2023-10-03 PROCEDURE — 3078F PR MOST RECENT DIASTOLIC BLOOD PRESSURE < 80 MM HG: ICD-10-PCS | Mod: CPTII,,, | Performed by: NURSE PRACTITIONER

## 2023-10-03 PROCEDURE — 3074F SYST BP LT 130 MM HG: CPT | Mod: CPTII,,, | Performed by: NURSE PRACTITIONER

## 2023-10-03 PROCEDURE — 1101F PR PT FALLS ASSESS DOC 0-1 FALLS W/OUT INJ PAST YR: ICD-10-PCS | Mod: CPTII,,, | Performed by: NURSE PRACTITIONER

## 2023-10-03 PROCEDURE — 4010F ACE/ARB THERAPY RXD/TAKEN: CPT | Mod: CPTII,,, | Performed by: NURSE PRACTITIONER

## 2023-10-03 PROCEDURE — 1126F PR PAIN SEVERITY QUANTIFIED, NO PAIN PRESENT: ICD-10-PCS | Mod: CPTII,,, | Performed by: NURSE PRACTITIONER

## 2023-10-03 PROCEDURE — 3008F PR BODY MASS INDEX (BMI) DOCUMENTED: ICD-10-PCS | Mod: CPTII,,, | Performed by: NURSE PRACTITIONER

## 2023-10-03 PROCEDURE — 3008F BODY MASS INDEX DOCD: CPT | Mod: CPTII,,, | Performed by: NURSE PRACTITIONER

## 2023-10-03 PROCEDURE — 99214 OFFICE O/P EST MOD 30 MIN: CPT | Mod: S$PBB,,, | Performed by: NURSE PRACTITIONER

## 2023-10-03 PROCEDURE — 84443 ASSAY THYROID STIM HORMONE: CPT | Performed by: NURSE PRACTITIONER

## 2023-10-03 PROCEDURE — 1160F RVW MEDS BY RX/DR IN RCRD: CPT | Mod: CPTII,,, | Performed by: NURSE PRACTITIONER

## 2023-10-03 PROCEDURE — 3288F PR FALLS RISK ASSESSMENT DOCUMENTED: ICD-10-PCS | Mod: CPTII,,, | Performed by: NURSE PRACTITIONER

## 2023-10-03 PROCEDURE — 1159F PR MEDICATION LIST DOCUMENTED IN MEDICAL RECORD: ICD-10-PCS | Mod: CPTII,,, | Performed by: NURSE PRACTITIONER

## 2023-10-03 PROCEDURE — 99214 OFFICE O/P EST MOD 30 MIN: CPT | Mod: PBBFAC,25 | Performed by: NURSE PRACTITIONER

## 2023-10-03 PROCEDURE — 1159F MED LIST DOCD IN RCRD: CPT | Mod: CPTII,,, | Performed by: NURSE PRACTITIONER

## 2023-10-03 PROCEDURE — 80053 COMPREHEN METABOLIC PANEL: CPT | Performed by: STUDENT IN AN ORGANIZED HEALTH CARE EDUCATION/TRAINING PROGRAM

## 2023-10-03 PROCEDURE — 3074F PR MOST RECENT SYSTOLIC BLOOD PRESSURE < 130 MM HG: ICD-10-PCS | Mod: CPTII,,, | Performed by: NURSE PRACTITIONER

## 2023-10-03 PROCEDURE — 3078F DIAST BP <80 MM HG: CPT | Mod: CPTII,,, | Performed by: NURSE PRACTITIONER

## 2023-10-03 PROCEDURE — 83735 ASSAY OF MAGNESIUM: CPT | Performed by: STUDENT IN AN ORGANIZED HEALTH CARE EDUCATION/TRAINING PROGRAM

## 2023-10-03 PROCEDURE — 3044F PR MOST RECENT HEMOGLOBIN A1C LEVEL <7.0%: ICD-10-PCS | Mod: CPTII,,, | Performed by: NURSE PRACTITIONER

## 2023-10-03 PROCEDURE — 3288F FALL RISK ASSESSMENT DOCD: CPT | Mod: CPTII,,, | Performed by: NURSE PRACTITIONER

## 2023-10-03 PROCEDURE — 4010F PR ACE/ARB THEARPY RXD/TAKEN: ICD-10-PCS | Mod: CPTII,,, | Performed by: NURSE PRACTITIONER

## 2023-10-03 PROCEDURE — 21400001 HC TELEMETRY ROOM

## 2023-10-03 PROCEDURE — 11000001 HC ACUTE MED/SURG PRIVATE ROOM

## 2023-10-03 PROCEDURE — 99214 PR OFFICE/OUTPT VISIT, EST, LEVL IV, 30-39 MIN: ICD-10-PCS | Mod: S$PBB,,, | Performed by: NURSE PRACTITIONER

## 2023-10-03 PROCEDURE — 1126F AMNT PAIN NOTED NONE PRSNT: CPT | Mod: CPTII,,, | Performed by: NURSE PRACTITIONER

## 2023-10-03 PROCEDURE — 93005 ELECTROCARDIOGRAM TRACING: CPT

## 2023-10-03 PROCEDURE — 1101F PT FALLS ASSESS-DOCD LE1/YR: CPT | Mod: CPTII,,, | Performed by: NURSE PRACTITIONER

## 2023-10-03 PROCEDURE — 3044F HG A1C LEVEL LT 7.0%: CPT | Mod: CPTII,,, | Performed by: NURSE PRACTITIONER

## 2023-10-03 PROCEDURE — 85025 COMPLETE CBC W/AUTO DIFF WBC: CPT | Performed by: STUDENT IN AN ORGANIZED HEALTH CARE EDUCATION/TRAINING PROGRAM

## 2023-10-03 PROCEDURE — 25000003 PHARM REV CODE 250: Performed by: NURSE PRACTITIONER

## 2023-10-03 PROCEDURE — 84484 ASSAY OF TROPONIN QUANT: CPT | Performed by: STUDENT IN AN ORGANIZED HEALTH CARE EDUCATION/TRAINING PROGRAM

## 2023-10-03 PROCEDURE — 1160F PR REVIEW ALL MEDS BY PRESCRIBER/CLIN PHARMACIST DOCUMENTED: ICD-10-PCS | Mod: CPTII,,, | Performed by: NURSE PRACTITIONER

## 2023-10-03 PROCEDURE — 99285 EMERGENCY DEPT VISIT HI MDM: CPT | Mod: 25,27

## 2023-10-03 RX ORDER — TRAZODONE HYDROCHLORIDE 50 MG/1
50 TABLET ORAL NIGHTLY PRN
Qty: 30 TABLET | Refills: 1 | Status: SHIPPED | OUTPATIENT
Start: 2023-10-03 | End: 2023-10-03 | Stop reason: HOSPADM

## 2023-10-03 RX ORDER — HYDROCHLOROTHIAZIDE 25 MG/1
25 TABLET ORAL DAILY
Status: DISCONTINUED | OUTPATIENT
Start: 2023-10-03 | End: 2023-10-03

## 2023-10-03 RX ORDER — LISINOPRIL 10 MG/1
40 TABLET ORAL DAILY
Status: DISCONTINUED | OUTPATIENT
Start: 2023-10-03 | End: 2023-10-05 | Stop reason: HOSPADM

## 2023-10-03 RX ORDER — DOCUSATE SODIUM 100 MG/1
100 CAPSULE, LIQUID FILLED ORAL 2 TIMES DAILY
Status: DISCONTINUED | OUTPATIENT
Start: 2023-10-03 | End: 2023-10-05 | Stop reason: HOSPADM

## 2023-10-03 RX ORDER — LISINOPRIL 10 MG/1
40 TABLET ORAL DAILY
Status: DISCONTINUED | OUTPATIENT
Start: 2023-10-03 | End: 2023-10-03

## 2023-10-03 RX ORDER — ATROPINE SULFATE 0.1 MG/ML
INJECTION INTRAVENOUS
Status: DISPENSED
Start: 2023-10-03 | End: 2023-10-04

## 2023-10-03 RX ORDER — LISINOPRIL 10 MG/1
40 TABLET ORAL DAILY
Status: DISCONTINUED | OUTPATIENT
Start: 2023-10-04 | End: 2023-10-03

## 2023-10-03 RX ORDER — HYDROCHLOROTHIAZIDE 25 MG/1
25 TABLET ORAL DAILY
Status: DISCONTINUED | OUTPATIENT
Start: 2023-10-04 | End: 2023-10-03

## 2023-10-03 RX ORDER — FAMOTIDINE 20 MG/1
20 TABLET, FILM COATED ORAL 2 TIMES DAILY
Status: DISCONTINUED | OUTPATIENT
Start: 2023-10-03 | End: 2023-10-05 | Stop reason: HOSPADM

## 2023-10-03 RX ORDER — ATORVASTATIN CALCIUM 10 MG/1
10 TABLET, FILM COATED ORAL NIGHTLY
Status: DISCONTINUED | OUTPATIENT
Start: 2023-10-03 | End: 2023-10-05 | Stop reason: HOSPADM

## 2023-10-03 RX ORDER — ATROPINE SULFATE 0.1 MG/ML
0.5 INJECTION INTRAVENOUS
Status: DISCONTINUED | OUTPATIENT
Start: 2023-10-03 | End: 2023-10-05 | Stop reason: HOSPADM

## 2023-10-03 RX ADMIN — ATORVASTATIN CALCIUM 10 MG: 10 TABLET, FILM COATED ORAL at 08:10

## 2023-10-03 RX ADMIN — FAMOTIDINE 20 MG: 20 TABLET, FILM COATED ORAL at 08:10

## 2023-10-03 RX ADMIN — DOCUSATE SODIUM 100 MG: 100 CAPSULE, LIQUID FILLED ORAL at 08:10

## 2023-10-03 NOTE — ED PROVIDER NOTES
Encounter Date: 10/3/2023       History     Chief Complaint   Patient presents with    Bradycardia     PT SENT FROM CLINIC FOR BRADYCARDIA AND HYPOTENSION, PT AMB, REPORTS FEELING FINE. DENIES CP/SOB.  HR 35.  EKG OBTAINED.      Patient was sent to the emergency department due to bradycardia.  He denies any cardiac history but he was on metoprolol.  He was feeling fine with no complaints it was at the medicine clinic for checkup when he was noted to be bradycardic in his sent to the ER for further evaluation.  He was denying any chest pain shortness of breath dizziness or lightheadedness or fatigue.  No known history of bradycardia, he did recently have cervical spinal fusion surgery.    The history is provided by the patient.     Review of patient's allergies indicates:  No Known Allergies  History reviewed. No pertinent past medical history.  Past Surgical History:   Procedure Laterality Date    ARTHROSCOPY OF KNEE  1974    CERVICAL LAMINECTOMY WITH SPINAL FUSION N/A 7/31/2023    Procedure: LAMINECTOMY, SPINE, CERVICAL, WITH FUSION;  Surgeon: Steven Clark MD;  Location: [facility] OR;  Service: Neurosurgery;  Laterality: N/A;  prone  chest rolls  neuromonitoring  DePuy  [place]olph  C3-C6    COLONOSCOPY  07/16/2021    UMBILICAL HERNIA REPAIR       Family History   Problem Relation Age of Onset    Dementia Mother     Rheum arthritis Mother     Heart disease Father     Hypertension Father      Social History     Tobacco Use    Smoking status: Never     Passive exposure: Never    Smokeless tobacco: Never   Substance Use Topics    Alcohol use: Not Currently    Drug use: Not Currently     Types: Marijuana     Comment: Gummies     Review of Systems   Constitutional:  Negative for chills and fever.   HENT:  Negative for congestion and sore throat.    Respiratory:  Negative for cough and shortness of breath.    Cardiovascular:  Negative for chest pain and palpitations.   Gastrointestinal:  Negative for abdominal pain and  nausea.   Genitourinary:  Negative for dysuria and hematuria.   Musculoskeletal:  Negative for arthralgias and myalgias.   Neurological:  Negative for dizziness and weakness.       Physical Exam     Initial Vitals [10/03/23 1135]   BP Pulse Resp Temp SpO2   (!) 150/63 (!) 35 18 97.9 °F (36.6 °C) 99 %      MAP       --         Physical Exam    Nursing note and vitals reviewed.  Constitutional: He appears well-developed and well-nourished.   HENT:   Head: Normocephalic and atraumatic.   Eyes: Conjunctivae are normal. Pupils are equal, round, and reactive to light.   Neck: Neck supple.   Normal range of motion.  Cardiovascular:  Regular rhythm and normal heart sounds.           Bradycardic   Pulmonary/Chest: Breath sounds normal. No respiratory distress.   Abdominal: Abdomen is soft. There is no abdominal tenderness.   Musculoskeletal:         General: No edema. Normal range of motion.      Cervical back: Normal range of motion and neck supple.     Neurological: He is alert and oriented to person, place, and time.   Skin: Skin is warm and dry.         ED Course   Procedures  Labs Reviewed   CBC WITH DIFFERENTIAL - Abnormal; Notable for the following components:       Result Value    MCV 94.5 (*)     MCH 31.2 (*)     MPV 13.0 (*)     All other components within normal limits   MAGNESIUM - Normal   TROPONIN I - Normal   CBC W/ AUTO DIFFERENTIAL    Narrative:     The following orders were created for panel order CBC auto differential.  Procedure                               Abnormality         Status                     ---------                               -----------         ------                     CBC with Differential[[id number]]       Abnormal            Final result                 Please view results for these tests on the individual orders.   COMPREHENSIVE METABOLIC PANEL   EXTRA TUBES    Narrative:     The following orders were created for panel order EXTRA TUBES.  Procedure                                Abnormality         Status                     ---------                               -----------         ------                     Light Blue Top Hold[[id number]]                             In process                 Gold Top Hold[[id number]]                                   In process                 Pink Top Hold[[id number]]                                   In process                   Please view results for these tests on the individual orders.   LIGHT BLUE TOP HOLD   GOLD TOP HOLD   PINK TOP HOLD     EKG Readings: (Independently Interpreted)   Initial Reading: No STEMI. Rhythm: Sinus Bradycardia. Heart Rate: 36. Ectopy: No Ectopy. Conduction: RBBB. Axis: Normal. Clinical Impression: AV Block - 2nd Degree - Mobitz II     ECG Results              EKG 12-lead (Weakness) Age > 50 (In process)  Result time 10/03/23 11:27:25      In process by Interface, Lab In [facility] (10/03/23 11:27:25)                   Narrative:    Test Reason : R53.1,    Vent. Rate : 036 BPM     Atrial Rate : 036 BPM     P-R Int : 234 ms          QRS Dur : 148 ms      QT Int : 530 ms       P-R-T Axes : 021 261 034 degrees     QTc Int : 409 ms    Marked sinus bradycardia with 1st degree A-V block  Right bundle branch block  Abnormal ECG  When compared with ECG of 28-JUL-2023 09:07,  VT interval has increased  Vent. rate has decreased BY  39 BPM  QT has shortened    Referred By:             Confirmed By:                                      EKG 12-lead (In process)  Result time 10/03/23 11:49:31      In process by Interface, Lab In [facility] (10/03/23 11:49:31)                   Narrative:    Test Reason : R53.1,    Vent. Rate : 036 BPM     Atrial Rate : 036 BPM     P-R Int : 234 ms          QRS Dur : 148 ms      QT Int : 530 ms       P-R-T Axes : 021 261 034 degrees     QTc Int : 409 ms    Marked sinus bradycardia with 1st degree A-V block  Right bundle branch block  Abnormal ECG  When compared with ECG of 28-JUL-2023 09:07,  VT interval has  increased  Vent. rate has decreased BY  39 BPM  QT has shortened    Referred By:             Confirmed By:                                   Imaging Results              X-Ray Chest AP Portable (Final result)  Result time 10/03/23 12:42:38      Final result by Marek Aiken MD (10/03/23 12:42:38)                   Impression:      No acute cardiopulmonary process.      Electronically signed by: Marek Aiken  Date:    10/03/2023  Time:    12:42               Narrative:    EXAMINATION:  XR CHEST AP PORTABLE    CLINICAL HISTORY:  Bradycardia;    TECHNIQUE:  Single view of the chest    COMPARISON:  No prior imaging available for comparison.    FINDINGS:  No focal opacification, pleural effusion, or pneumothorax.    The cardiomediastinal silhouette is within normal limits.    No acute osseous abnormality.                                       Medications - No data to display  Medical Decision Making  Heart rate is in the 30s but blood pressure is stable, EKGs on my review appears to be a Mobitz type 2 with a 2-1 AV block.  He was otherwise asymptomatic from this.  CBC, CMP troponin are all unremarkable.  Chest x-ray was without acute abnormalities.  Discussed with the patient with our cardiologist on-call here Dr. Zurita, stated he does not have capabilities to place a pacemaker this week.  Therefore recommended transfer.  Patient was [facility] to [facility] by Dr. Lopez.    Amount and/or Complexity of Data Reviewed  Labs: ordered. Decision-making details documented in ED Course.  Radiology: ordered.  ECG/medicine tests: ordered and independent interpretation performed. Decision-making details documented in ED Course.    Risk  Decision regarding hospitalization.              Attending Attestation:         Attending Critical Care:   Critical Care Times:   Direct Patient Care (initial evaluation, reassessments, and time considering the case)................................................................11  minutes.   Additional History from reviewing old medical records or taking additional history from the family, EMS, PCP, etc.......................4 minutes.   Ordering, Reviewing, and Interpreting Diagnostic Studies...............................................................................................................6 minutes.   Documentation..................................................................................................................................................................................7 minutes.   Consultation with other Physicians. .................................................................................................................................................5 minutes.   Consultation with the patient's family directly relating to the patient's condition, care, and DNR status (when patient unable)......0 minutes.   Other..................................................................................................................................................................................................0 minutes.   ==============================================================  Total Critical Care Time - exclusive of procedural time: 33 minutes.  ==============================================================                          Clinical Impression:   Final diagnoses:  [R53.1] Weakness  [R00.1] Bradycardia  [I44.1] Mobitz type 2 second degree AV block (Primary)        ED Disposition Condition    Transfer to Another Facility Ken Serrano MD  10/03/23 8701

## 2023-10-03 NOTE — PLAN OF CARE
10/03/23 1605   Discharge Assessment   Assessment Type Discharge Planning Assessment   Confirmed/corrected address, phone number and insurance Yes   Source of Information patient;family   When was your last doctors appointment?   (Last PCP appointment was in August)   Reason For Admission Bradycardia   People in Home alone   Do you expect to return to your current living situation? Yes   Do you have help at home or someone to help you manage your care at home? Yes   Prior to hospitilization cognitive status: Alert/Oriented   Current cognitive status: Alert/Oriented   Home Accessibility wheelchair accessible   Home Layout Able to live on 1st floor   Equipment Currently Used at Home none   Readmission within 30 days? No   Do you currently have service(s) that help you manage your care at home? No   Do you take prescription medications? Yes   Do you have prescription coverage? Yes   Do you have any problems affording any of your prescribed medications? No   Who is going to help you get home at discharge? Family   How do you get to doctors appointments? car, drives self   Are you on dialysis? No   Do you take coumadin? No   Discharge Plan discussed with: Patient   Discharge Plan A Home   Discharge Plan B Home

## 2023-10-03 NOTE — ASSESSMENT & PLAN NOTE
"New onset. Pulse is in the 30s manually. He reports feeling "mellow." He had neck surgery at the end of July. Doing P.T.  Sending to ER for eval of new onset bradycardia  Hold HCTZ and Lisinopril   "

## 2023-10-03 NOTE — H&P
[facility] - 6th Floor Medical Telemetry    Cardiology  History and Physical     Patient Name: Corey Montano  MRN: [id number]  Admission Date: 10/3/2023  Code Status: No Order   Attending Provider: Marek Lopez MD   Primary Care Physician: Jere Suazo FNP  Principal Problem:<principal problem not specified>    Patient information was obtained from patient, past medical records, ER records, and primary team.     Subjective:   Chief Complaint:  Bradycardia    HPI:   Mr. Montano is a 67 year old male, unknown to [facility], who presented to the hospital from [facility] ER due to bradycardia. He was evaluated in outpatient medicine clinic on 10.3.23, found to be bradycardic by pulse check and sent to the ER for evaluation. He was asymptomatic. On Long-acting Metoprolol outpatient, now on hold. 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 Patient admitted to [facility] for EP Evaluation.    PMH: Hypertension, Hyperlipidemia, Vitamin D Deficiency, Spinal Disease/Fusion Surgery  PSH: Knee Arthroscopy, Cervical Laminectomy with Spinal Fusion, Colonoscopy, Umbilical Hernia Repair  Family History: Father- Hypertension/Heart Disease, Mother- RA  Social History: Tobacco- Negative, Alcohol- Negative, Substance Abuse- Marijuana Use    Previous Cardiac Diagnostics:   Echocardiogram (10.3.23):  Left Ventricle: The left ventricle is normal in size. Normal wall thickness. Normal wall motion. There is normal systolic function with a visually estimated ejection fraction of 65 - 70%.  Left Atrium: Left atrium is mildly dilated.  Right Ventricle: Normal right ventricular cavity size. Systolic function is normal.  Aortic Valve: The aortic valve is a trileaflet valve. There is normal leaflet mobility.  Pulmonary Artery: The estimated pulmonary artery systolic pressure is 20 mmHg.  IVC/SVC: Normal venous pressure at 3 mmHg.    Review of patient's allergies indicates:  No Known Allergies    No current  facility-administered medications on file prior to encounter.     Current Outpatient Medications on File Prior to Encounter   Medication Sig    acetaminophen (TYLENOL 8 HOUR ORAL) Take by mouth.    atorvastatin (LIPITOR) 10 MG tablet Take 1 tablet (10 mg total) by mouth every evening.    docusate sodium (COLACE) 100 MG capsule Take 100 mg by mouth 2 (two) times daily.    gabapentin (NEURONTIN) 300 MG capsule Take 1 capsule (300 mg total) by mouth 3 (three) times daily.    methocarbamoL (ROBAXIN) 750 MG Tab Take 1 tablet (750 mg total) by mouth every 8 (eight) hours as needed (muscle spasms).    metoprolol succinate (TOPROL-XL) 50 MG 24 hr tablet Take 1 tablet (50 mg total) by mouth once daily.    oxyCODONE-acetaminophen (PERCOCET)  mg per tablet Take 1 tablet by mouth every 8 (eight) hours as needed for Pain.    tadalafiL (CIALIS) 10 MG tablet Take 1 tablet (10 mg total) by mouth daily as needed for Erectile Dysfunction. (Patient not taking: Reported on 10/3/2023)    [DISCONTINUED] hydroCHLOROthiazide (HYDRODIURIL) 25 MG tablet Take 1 tablet (25 mg total) by mouth once daily.    [DISCONTINUED] lisinopriL (PRINIVIL,ZESTRIL) 40 MG tablet Take 1 tablet (40 mg total) by mouth once daily.    [DISCONTINUED] traZODone (DESYREL) 50 MG tablet Take 1 tablet (50 mg total) by mouth nightly as needed for Insomnia.     Review of Systems   Cardiovascular:  Negative for chest pain and syncope.   Respiratory:  Negative for shortness of breath.    All other systems reviewed and are negative.    Objective:     Vital Signs (Most Recent):    Vital Signs (24h Range):  Temp:  [97.5 °F (36.4 °C)-97.9 °F (36.6 °C)] 97.9 °F (36.6 °C)  Pulse:  [33-40] 33  Resp:  [14-23] 20  SpO2:  [95 %-100 %] 95 %  BP: (113-198)/(51-71) 144/71        There is no height or weight on file to calculate BMI.            No intake or output data in the 24 hours ending 10/03/23 1601    Lines/Drains/Airways       None                 Physical Exam  Vitals and  "nursing note reviewed.   Constitutional:       General: He is not in acute distress.     Appearance: He is obese. He is not ill-appearing.   HENT:      Head: Normocephalic.      Mouth/Throat:      Mouth: Mucous membranes are moist.      Pharynx: Oropharynx is clear.   Cardiovascular:      Rate and Rhythm: Bradycardia present.      Heart sounds: Normal heart sounds.   Pulmonary:      Effort: Pulmonary effort is normal. No respiratory distress.      Breath sounds: Normal breath sounds. No wheezing or rales.   Abdominal:      General: There is no distension.      Palpations: Abdomen is soft.      Tenderness: There is no abdominal tenderness. There is no guarding.   Musculoskeletal:         General: Normal range of motion.      Cervical back: Neck supple.   Skin:     General: Skin is warm and dry.   Neurological:      General: No focal deficit present.      Mental Status: He is alert and oriented to person, place, and time. Mental status is at baseline.      Motor: No weakness.   Psychiatric:         Mood and Affect: Mood normal.         Behavior: Behavior normal.         Thought Content: Thought content normal.         Judgment: Judgment normal.       EKG:       Telemetry: Complete Heart Block with Junctional Escape Rhythm & RBBB    Significant Labs: BMP:   Recent Labs   Lab 10/03/23  1158      K 4.2   CO2 23   BUN 20.4   CREATININE 0.93   CALCIUM 10.0   MG 2.30   , CMP   Recent Labs   Lab 10/03/23  1158      K 4.2   CO2 23   BUN 20.4   CREATININE 0.93   CALCIUM 10.0   ALBUMIN 4.0   BILITOT 0.9   ALKPHOS 100   AST 26   ALT 40   , CBC   Recent Labs   Lab 10/03/23  1158   WBC 8.23   HGB 15.3   HCT 46.3      , INR No results for input(s): "INR", "PROTIME" in the last 48 hours., Lipid Panel No results for input(s): "CHOL", "HDL", "LDLCALC", "TRIG", "CHOLHDL" in the last 48 hours., Troponin   Recent Labs   Lab 10/03/23  1158   TROPONINI 0.017   , and All pertinent lab results from the last 24 hours have " been reviewed.    Significant Imaging:   Chest Radiograph (10.3.23):  FINDINGS:  No focal opacification, pleural effusion, or pneumothorax.  The cardiomediastinal silhouette is within normal limits.  No acute osseous abnormality.  Impression:  No acute cardiopulmonary process.    Assessment and Plan:   Impression:  Complete Heart Block with Junctional Escape Rhythm & Right Bundle Branch Block    - Asymptomatic    - EF 65-70%  Hypertension (Above Goal)  Hyperlipidemia  Cervical Spinal Disease    - Status Post C3 through 6 laminectomy with medial facetectomy and posterior fusion (7.31.23)  Elevated BMI/Obesity  Vitamin D Deficiency    Plan:  Continue to Hold all SA/AV Stan Blocking Agents  Resume Home Medications  Monitor on Tele  Echo Reviewed with Intact LV Function  Atropine at Bedside  Check TSH  Plan EP Evaluation with Probable PPM Placement on Thursday with Dr. Perez (He is aware of case)    VTE Risk Mitigation (From admission, onward)      None            KATHLEEN Meeks  Cardiology  [facility] - 6th Floor Medical Telemetry  10/03/2023 4:01 PM     I have seen the patient, reviewed the Nurse Practitioner's note, assessment and plan. I have personally interviewed and examined the patient at bedside and agree with the findings. Medical decision making listed above were done under my guidance.    Physical exam:  Cardiovascular system: regular rhythm, no murmur.  Lungs: CTAB.  Extremities: No leg edema.    Plan:  EP consult for pacer
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
## 2023-10-03 NOTE — Clinical Note
The lead was inserted under fluorscopic guidance, thresholds were tested, was connected to generator and was sutured in place. The lead was inserted in the right ventricle. A new lead was attached to the right ventricle.              The lead locking device was attached to the existing lead.

## 2023-10-03 NOTE — Clinical Note
The lead was inserted under fluorscopic guidance, thresholds were tested, was connected to generator and was sutured in place. The lead was inserted in the right atrium. A new lead was attached to the right atrium.              The lead locking device was attached to the existing lead.

## 2023-10-04 PROCEDURE — 25000003 PHARM REV CODE 250: Performed by: NURSE PRACTITIONER

## 2023-10-04 PROCEDURE — 63600175 PHARM REV CODE 636 W HCPCS: Performed by: NURSE PRACTITIONER

## 2023-10-04 PROCEDURE — 21400001 HC TELEMETRY ROOM

## 2023-10-04 RX ORDER — ENOXAPARIN SODIUM 100 MG/ML
40 INJECTION SUBCUTANEOUS ONCE
Status: COMPLETED | OUTPATIENT
Start: 2023-10-04 | End: 2023-10-04

## 2023-10-04 RX ADMIN — FAMOTIDINE 20 MG: 20 TABLET, FILM COATED ORAL at 09:10

## 2023-10-04 RX ADMIN — LISINOPRIL 40 MG: 10 TABLET ORAL at 08:10

## 2023-10-04 RX ADMIN — DOCUSATE SODIUM 100 MG: 100 CAPSULE, LIQUID FILLED ORAL at 09:10

## 2023-10-04 RX ADMIN — ENOXAPARIN SODIUM 40 MG: 40 INJECTION SUBCUTANEOUS at 10:10

## 2023-10-04 RX ADMIN — ATORVASTATIN CALCIUM 10 MG: 10 TABLET, FILM COATED ORAL at 09:10

## 2023-10-04 RX ADMIN — FAMOTIDINE 20 MG: 20 TABLET, FILM COATED ORAL at 08:10

## 2023-10-04 RX ADMIN — DOCUSATE SODIUM 100 MG: 100 CAPSULE, LIQUID FILLED ORAL at 08:10

## 2023-10-04 NOTE — NURSING
Nurses Note -- 4 Eyes      10/4/2023   2:31 PM      Skin assessed during: Admit      [x] No Altered Skin Integrity Present    []Prevention Measures Documented      [] Yes- Altered Skin Integrity Present or Discovered   [] LDA Added if Not in Epic (Describe Wound)   [] New Altered Skin Integrity was Present on Admit and Documented in LDA   [] Wound Image Taken    Wound Care Consulted? No    Attending Nurse:  Stephani Lucero RN/Staff Member:   Sarah Echevarria

## 2023-10-04 NOTE — PROGRESS NOTES
[facility] - 6th Floor Medical Telemetry    Cardiology  Progress Note    Patient Name: Corey Montano  MRN: [id number]  Admission Date: 10/3/2023  Hospital Length of Stay: 1 days  Code Status: No Order   Attending Physician: Marek Lopez MD   Primary Care Physician: Jere Suazo FNP  Expected Discharge Date:   Principal Problem:<principal problem not specified>    Subjective:   Chief Complaint:  Bradycardia     HPI:   Mr. Montano is a 67 year old male, unknown to [facility], who presented to the hospital from [facility] ER due to bradycardia. He was evaluated in outpatient medicine clinic on 10.3.23, found to be bradycardic by pulse check and sent to the ER for evaluation. He was asymptomatic. On Long-acting Metoprolol outpatient, now on hold. 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 Patient admitted to [facility] for EP Evaluation.    Hospital Course:   10.4.23: NAD Noted. Denies Syncope, dizziness, CP. HR in the 30's-40's. BP Stable.      PMH: Hypertension, Hyperlipidemia, Vitamin D Deficiency, Spinal Disease/Fusion Surgery  PSH: Knee Arthroscopy, Cervical Laminectomy with Spinal Fusion, Colonoscopy, Umbilical Hernia Repair  Family History: Father- Hypertension/Heart Disease, Mother- RA  Social History: Tobacco- Negative, Alcohol- Negative, Substance Abuse- Marijuana Use     Previous Cardiac Diagnostics:   Echocardiogram (10.3.23):  Left Ventricle: The left ventricle is normal in size. Normal wall thickness. Normal wall motion. There is normal systolic function with a visually estimated ejection fraction of 65 - 70%.  Left Atrium: Left atrium is mildly dilated.  Right Ventricle: Normal right ventricular cavity size. Systolic function is normal.  Aortic Valve: The aortic valve is a trileaflet valve. There is normal leaflet mobility.  Pulmonary Artery: The estimated pulmonary artery systolic pressure is 20 mmHg.  IVC/SVC: Normal venous pressure at 3 mmHg.     Review of  patient's allergies indicates:  No Known Allergies    Review of Systems   Cardiovascular:  Negative for chest pain.   Respiratory:  Negative for shortness of breath.    All other systems reviewed and are negative.    Objective:     Vital Signs (Most Recent):  Temp: 97.7 °F (36.5 °C) (10/04/23 0826)  Pulse: (!) 39 (10/04/23 0826)  Resp: 15 (10/04/23 0826)  BP: (!) 174/71 (10/04/23 0826)  SpO2: 96 % (10/04/23 0826) Vital Signs (24h Range):  Temp:  [97.5 °F (36.4 °C)-98.6 °F (37 °C)] 97.7 °F (36.5 °C)  Pulse:  [33-40] 39  Resp:  [14-23] 15  SpO2:  [94 %-100 %] 96 %  BP: (113-198)/(51-71) 174/71        There is no height or weight on file to calculate BMI.    SpO2: 96 %         Intake/Output Summary (Last 24 hours) at 10/4/2023 1006  Last data filed at 10/4/2023 0813  Gross per 24 hour   Intake 120 ml   Output 725 ml   Net -605 ml       Lines/Drains/Airways       Peripheral Intravenous Line  Duration                  Peripheral IV - Single Lumen 20 G Left Antecubital -- days         Peripheral IV - Single Lumen 10/03/23 20 G Right Antecubital 1 day                  Significant Labs:   Recent Results (from the past 72 hour(s))   Comprehensive metabolic panel    Collection Time: 10/03/23 11:58 AM   Result Value Ref Range    Sodium Level 139 136 - 145 mmol/L    Potassium Level 4.2 3.5 - 5.1 mmol/L    Chloride 104 98 - 107 mmol/L    Carbon Dioxide 23 23 - 31 mmol/L    Glucose Level 105 82 - 115 mg/dL    Blood Urea Nitrogen 20.4 8.4 - 25.7 mg/dL    Creatinine 0.93 0.73 - 1.18 mg/dL    Calcium Level Total 10.0 8.8 - 10.0 mg/dL    Protein Total 7.5 5.8 - 7.6 gm/dL    Albumin Level 4.0 3.4 - 4.8 g/dL    Globulin 3.5 2.4 - 3.5 gm/dL    Albumin/Globulin Ratio 1.1 1.1 - 2.0 ratio    Bilirubin Total 0.9 <=1.5 mg/dL    Alkaline Phosphatase 100 40 - 150 unit/L    Alanine Aminotransferase 40 0 - 55 unit/L    Aspartate Aminotransferase 26 5 - 34 unit/L    eGFR >60 mls/min/1.73/m2   Magnesium    Collection Time: 10/03/23 11:58 AM    Result Value Ref Range    Magnesium Level 2.30 1.60 - 2.60 mg/dL   Troponin I    Collection Time: 10/03/23 11:58 AM   Result Value Ref Range    Troponin-I 0.017 0.000 - 0.045 ng/mL   CBC with Differential    Collection Time: 10/03/23 11:58 AM   Result Value Ref Range    WBC 8.23 4.50 - 11.50 x10(3)/mcL    RBC 4.90 4.70 - 6.10 x10(6)/mcL    Hgb 15.3 14.0 - 18.0 g/dL    Hct 46.3 42.0 - 52.0 %    MCV 94.5 (H) 80.0 - 94.0 fL    MCH 31.2 (H) 27.0 - 31.0 pg    MCHC 33.0 33.0 - 36.0 g/dL    RDW 13.3 11.5 - 17.0 %    Platelet 234 130 - 400 x10(3)/mcL    MPV 13.0 (H) 7.4 - 10.4 fL    Neut % 53.0 %    Lymph % 32.6 %    Mono % 11.2 %    Eos % 1.9 %    Basophil % 0.9 %    Lymph # 2.68 0.6 - 4.6 x10(3)/mcL    Neut # 4.37 2.1 - 9.2 x10(3)/mcL    Mono # 0.92 0.1 - 1.3 x10(3)/mcL    Eos # 0.16 0 - 0.9 x10(3)/mcL    Baso # 0.07 <=0.2 x10(3)/mcL    IG# 0.03 0 - 0.04 x10(3)/mcL    IG% 0.4 %    NRBC% 0.0 %   TSH    Collection Time: 10/03/23 11:58 AM   Result Value Ref Range    TSH 1.789 0.350 - 4.940 uIU/mL   Echo    Collection Time: 10/03/23  3:10 PM   Result Value Ref Range    BSA 2.47 m2    LVOT stroke volume 98.65 cm3    LVIDd 5.49 3.5 - 6.0 cm    LV Systolic Volume 47.56 mL    LV Systolic Volume Index 19.7 mL/m2    LVIDs 3.40 2.1 - 4.0 cm    LV Diastolic Volume 146.62 mL    LV Diastolic Volume Index 60.59 mL/m2    IVS 1.02 0.6 - 1.1 cm    LVOT diameter 2.39 cm    LVOT area 4.5 cm2    FS 38 28 - 44 %    Left Ventricle Relative Wall Thickness 0.40 cm    Posterior Wall 1.09 0.6 - 1.1 cm    LV mass 228.16 g    LV Mass Index 94 g/m2    MV Peak E Mark 0.89 m/s    TDI LATERAL 0.07 m/s    TDI SEPTAL 0.07 m/s    E/E' ratio 12.71 m/s    MV Peak A Mark 0.77 m/s    TR Max Mark 2.06 m/s    E/A ratio 1.16     E wave deceleration time 196.87 msec    LV SEPTAL E/E' RATIO 12.71 m/s    LV LATERAL E/E' RATIO 12.71 m/s    LVOT peak mark 0.99 m/s    Left Ventricular Outflow Tract Mean Velocity 0.66 cm/s    Left Ventricular Outflow Tract Mean Gradient  1.99 mmHg    LA size 4.26 cm    RVDD 3.41 cm    AV mean gradient 5 mmHg    AV peak gradient 11 mmHg    Ao peak annie 1.67 m/s    Ao VTI 41.90 cm    LVOT peak VTI 22.00 cm    AV valve area 2.35 cm²    AV Velocity Ratio 0.59     AV index (prosthetic) 0.53     DORIS by Velocity Ratio 2.66 cm²    MV mean gradient 2 mmHg    MV peak gradient 5 mmHg    MV stenosis pressure 1/2 time 57.09 ms    MV valve area p 1/2 method 3.85 cm2    MV valve area by continuity eq 3.16 cm2    MV VTI 31.2 cm    Triscuspid Valve Regurgitation Peak Gradient 17 mmHg    Mean e' 0.07 m/s    ZLVIDS -5.52     ZLVIDD -7.34     TV resting pulmonary artery pressure 20 mmHg    RV TB RVSP 5 mmHg    Est. RA pres 3 mmHg     Telemetry:  Complete Heart Block HR 30's    Physical Exam  Vitals and nursing note reviewed.   Constitutional:       General: He is not in acute distress.     Appearance: Normal appearance. He is not ill-appearing.   HENT:      Head: Normocephalic.      Mouth/Throat:      Mouth: Mucous membranes are moist.      Pharynx: Oropharynx is clear.   Cardiovascular:      Rate and Rhythm: Bradycardia present.      Heart sounds: Normal heart sounds.   Pulmonary:      Effort: Pulmonary effort is normal. No respiratory distress.      Breath sounds: Normal breath sounds. No wheezing or rales.   Abdominal:      General: There is no distension.      Palpations: Abdomen is soft.      Tenderness: There is no abdominal tenderness. There is no guarding.   Musculoskeletal:         General: Normal range of motion.      Cervical back: Neck supple.   Skin:     General: Skin is warm and dry.   Neurological:      General: No focal deficit present.      Mental Status: He is alert and oriented to person, place, and time. Mental status is at baseline.      Motor: No weakness.   Psychiatric:         Mood and Affect: Mood normal.         Behavior: Behavior normal.         Thought Content: Thought content normal.         Judgment: Judgment normal.       Current Inpatient  Medications:    Current Facility-Administered Medications:     atorvastatin tablet 10 mg, 10 mg, Oral, QHS, Natalie, Rein T, FNP, 10 mg at 10/03/23 2053    atropine injection 0.5 mg, 0.5 mg, Intravenous, PRN, Temitope Estrada, FNP    docusate sodium capsule 100 mg, 100 mg, Oral, BID, Natalie, Rein T, FNP, 100 mg at 10/04/23 0844    famotidine tablet 20 mg, 20 mg, Oral, BID, Natalie, Rein T, FNP, 20 mg at 10/04/23 0844    lisinopriL tablet 40 mg, 40 mg, Oral, Daily, Natalie, Rein T, FNP, 40 mg at 10/04/23 0844    VTE Risk Mitigation (From admission, onward)      None          Imaging:  Chest Radiograph (10.3.23):  FINDINGS:  No focal opacification, pleural effusion, or pneumothorax.  The cardiomediastinal silhouette is within normal limits.  No acute osseous abnormality.  Impression:  No acute cardiopulmonary process.     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  Plan EP Evaluation with PPM Placement Tomorrow with Dr. Perez (He is aware of case)    KATHLEEN Meeks  Cardiology  [facility] - 6th Floor Medical Telemetry  10/04/2023     I have seen the patient, reviewed the Nurse Practitioner's note, assessment and plan. I have personally interviewed and examined the patient at bedside and agree with the findings. Medical decision making listed above were done under my guidance.    Physical exam:  Cardiovascular system: regular rhythm, no murmur.  Lungs: CTAB.  Extremities: No leg edema.    Plan:  Pacer  in am

## 2023-10-05 VITALS
SYSTOLIC BLOOD PRESSURE: 142 MMHG | DIASTOLIC BLOOD PRESSURE: 76 MMHG | OXYGEN SATURATION: 97 % | WEIGHT: 253.31 LBS | BODY MASS INDEX: 32.52 KG/M2 | TEMPERATURE: 98 F | RESPIRATION RATE: 18 BRPM | HEART RATE: 72 BPM

## 2023-10-05 PROBLEM — I44.30 AV BLOCK: Status: ACTIVE | Noted: 2023-10-05

## 2023-10-05 LAB
ANION GAP SERPL CALC-SCNC: 8 MEQ/L
BASOPHILS # BLD AUTO: 0.07 X10(3)/MCL
BASOPHILS NFR BLD AUTO: 1 %
BUN SERPL-MCNC: 12.7 MG/DL (ref 8.4–25.7)
CALCIUM SERPL-MCNC: 9.4 MG/DL (ref 8.8–10)
CHLORIDE SERPL-SCNC: 110 MMOL/L (ref 98–107)
CO2 SERPL-SCNC: 23 MMOL/L (ref 23–31)
CREAT SERPL-MCNC: 0.84 MG/DL (ref 0.73–1.18)
CREAT/UREA NIT SERPL: 15
EOSINOPHIL # BLD AUTO: 0.24 X10(3)/MCL (ref 0–0.9)
EOSINOPHIL NFR BLD AUTO: 3.3 %
ERYTHROCYTE [DISTWIDTH] IN BLOOD BY AUTOMATED COUNT: 13.5 % (ref 11.5–17)
GFR SERPLBLD CREATININE-BSD FMLA CKD-EPI: >60 MLS/MIN/1.73/M2
GLUCOSE SERPL-MCNC: 112 MG/DL (ref 82–115)
HCT VFR BLD AUTO: 43.9 % (ref 42–52)
HGB BLD-MCNC: 14.7 G/DL (ref 14–18)
IMM GRANULOCYTES # BLD AUTO: 0.02 X10(3)/MCL (ref 0–0.04)
IMM GRANULOCYTES NFR BLD AUTO: 0.3 %
LYMPHOCYTES # BLD AUTO: 2.24 X10(3)/MCL (ref 0.6–4.6)
LYMPHOCYTES NFR BLD AUTO: 30.8 %
MAGNESIUM SERPL-MCNC: 2.4 MG/DL (ref 1.6–2.6)
MCH RBC QN AUTO: 31.3 PG (ref 27–31)
MCHC RBC AUTO-ENTMCNC: 33.5 G/DL (ref 33–36)
MCV RBC AUTO: 93.4 FL (ref 80–94)
MONOCYTES # BLD AUTO: 0.78 X10(3)/MCL (ref 0.1–1.3)
MONOCYTES NFR BLD AUTO: 10.7 %
NEUTROPHILS # BLD AUTO: 3.93 X10(3)/MCL (ref 2.1–9.2)
NEUTROPHILS NFR BLD AUTO: 53.9 %
NRBC BLD AUTO-RTO: 0 %
PLATELET # BLD AUTO: 218 X10(3)/MCL (ref 130–400)
PMV BLD AUTO: 12.6 FL (ref 7.4–10.4)
POTASSIUM SERPL-SCNC: 4.8 MMOL/L (ref 3.5–5.1)
RBC # BLD AUTO: 4.7 X10(6)/MCL (ref 4.7–6.1)
SODIUM SERPL-SCNC: 141 MMOL/L (ref 136–145)
WBC # SPEC AUTO: 7.28 X10(3)/MCL (ref 4.5–11.5)

## 2023-10-05 PROCEDURE — 85025 COMPLETE CBC W/AUTO DIFF WBC: CPT | Performed by: NURSE PRACTITIONER

## 2023-10-05 PROCEDURE — 93010 ELECTROCARDIOGRAM REPORT: CPT | Mod: ,,, | Performed by: INTERNAL MEDICINE

## 2023-10-05 PROCEDURE — 83735 ASSAY OF MAGNESIUM: CPT | Performed by: NURSE PRACTITIONER

## 2023-10-05 PROCEDURE — C1898 LEAD, PMKR, OTHER THAN TRANS: HCPCS | Performed by: INTERNAL MEDICINE

## 2023-10-05 PROCEDURE — 25500020 PHARM REV CODE 255: Performed by: INTERNAL MEDICINE

## 2023-10-05 PROCEDURE — C1785 PMKR, DUAL, RATE-RESP: HCPCS | Performed by: INTERNAL MEDICINE

## 2023-10-05 PROCEDURE — 93010 EKG 12-LEAD: ICD-10-PCS | Mod: ,,, | Performed by: INTERNAL MEDICINE

## 2023-10-05 PROCEDURE — 93005 ELECTROCARDIOGRAM TRACING: CPT

## 2023-10-05 PROCEDURE — 99152 MOD SED SAME PHYS/QHP 5/>YRS: CPT | Performed by: INTERNAL MEDICINE

## 2023-10-05 PROCEDURE — 33208 INSRT HEART PM ATRIAL & VENT: CPT | Mod: KX | Performed by: INTERNAL MEDICINE

## 2023-10-05 PROCEDURE — 25000003 PHARM REV CODE 250: Performed by: NURSE PRACTITIONER

## 2023-10-05 PROCEDURE — 25000003 PHARM REV CODE 250: Performed by: INTERNAL MEDICINE

## 2023-10-05 PROCEDURE — 63600175 PHARM REV CODE 636 W HCPCS: Performed by: INTERNAL MEDICINE

## 2023-10-05 PROCEDURE — 80048 BASIC METABOLIC PNL TOTAL CA: CPT | Performed by: NURSE PRACTITIONER

## 2023-10-05 PROCEDURE — 99153 MOD SED SAME PHYS/QHP EA: CPT | Performed by: INTERNAL MEDICINE

## 2023-10-05 DEVICE — PULSE GENERATOR
Type: IMPLANTABLE DEVICE | Site: CHEST  WALL | Status: FUNCTIONAL
Brand: ASSURITY MRI™

## 2023-10-05 DEVICE — PACING LEAD
Type: IMPLANTABLE DEVICE | Site: CHEST  WALL | Status: FUNCTIONAL
Brand: TENDRIL™

## 2023-10-05 RX ORDER — MIDAZOLAM HYDROCHLORIDE 1 MG/ML
INJECTION INTRAMUSCULAR; INTRAVENOUS
Status: DISCONTINUED | OUTPATIENT
Start: 2023-10-05 | End: 2023-10-05 | Stop reason: HOSPADM

## 2023-10-05 RX ORDER — HYDROCHLOROTHIAZIDE 25 MG/1
25 TABLET ORAL DAILY
Qty: 30 TABLET | Refills: 11 | Status: SHIPPED | OUTPATIENT
Start: 2023-10-05 | End: 2024-10-04

## 2023-10-05 RX ORDER — DOXYCYCLINE 100 MG/1
100 CAPSULE ORAL EVERY 12 HOURS
Qty: 10 CAPSULE | Refills: 0 | Status: SHIPPED | OUTPATIENT
Start: 2023-10-05 | End: 2023-10-10

## 2023-10-05 RX ORDER — MORPHINE SULFATE 4 MG/ML
2 INJECTION, SOLUTION INTRAMUSCULAR; INTRAVENOUS EVERY 4 HOURS PRN
Status: DISCONTINUED | OUTPATIENT
Start: 2023-10-05 | End: 2023-10-05 | Stop reason: HOSPADM

## 2023-10-05 RX ORDER — LISINOPRIL 40 MG/1
40 TABLET ORAL DAILY
Qty: 90 TABLET | Refills: 3 | Status: SHIPPED | OUTPATIENT
Start: 2023-10-06 | End: 2024-10-05

## 2023-10-05 RX ORDER — LIDOCAINE HYDROCHLORIDE 10 MG/ML
INJECTION INFILTRATION; PERINEURAL
Status: DISCONTINUED | OUTPATIENT
Start: 2023-10-05 | End: 2023-10-05 | Stop reason: HOSPADM

## 2023-10-05 RX ORDER — VANCOMYCIN HYDROCHLORIDE 1 G/20ML
INJECTION, POWDER, LYOPHILIZED, FOR SOLUTION INTRAVENOUS
Status: DISCONTINUED | OUTPATIENT
Start: 2023-10-05 | End: 2023-10-05 | Stop reason: HOSPADM

## 2023-10-05 RX ORDER — ACETAMINOPHEN 325 MG/1
650 TABLET ORAL EVERY 4 HOURS PRN
Status: DISCONTINUED | OUTPATIENT
Start: 2023-10-05 | End: 2023-10-05 | Stop reason: HOSPADM

## 2023-10-05 RX ORDER — FENTANYL CITRATE 50 UG/ML
INJECTION, SOLUTION INTRAMUSCULAR; INTRAVENOUS
Status: DISCONTINUED | OUTPATIENT
Start: 2023-10-05 | End: 2023-10-05 | Stop reason: HOSPADM

## 2023-10-05 RX ORDER — HYDROCODONE BITARTRATE AND ACETAMINOPHEN 5; 325 MG/1; MG/1
1 TABLET ORAL EVERY 4 HOURS PRN
Status: DISCONTINUED | OUTPATIENT
Start: 2023-10-05 | End: 2023-10-05 | Stop reason: HOSPADM

## 2023-10-05 RX ADMIN — DOCUSATE SODIUM 100 MG: 100 CAPSULE, LIQUID FILLED ORAL at 09:10

## 2023-10-05 RX ADMIN — LISINOPRIL 40 MG: 10 TABLET ORAL at 09:10

## 2023-10-05 RX ADMIN — FAMOTIDINE 20 MG: 20 TABLET, FILM COATED ORAL at 09:10

## 2023-10-05 NOTE — DISCHARGE SUMMARY
[facility] - 6th Floor Medical Telemetry  Cardiology  Discharge Summary      Patient Name: Corey Montano  MRN: [id number]  Admission Date: 10/3/2023  Hospital Length of Stay: 2 days  Discharge Date and Time:  10/05/2023 3:47 PM  Attending Physician: Marek Lopez MD    Discharging Provider: KATHLEEN Meeks  Primary Care Physician: Jere Suazo FNP    HPI:   Consultation Reason: Complete Heart Block Evaluation for Pacemaker Placement     HPI:   Mr. Montano is a 67 year old male, known to [facility] through this hospitalization, who presented to the hospital from [facility] ER due to bradycardia on 10.3.23. He was evaluated in outpatient medicine clinic on 10.3.23, found to be bradycardic by pulse check and sent to the ER for evaluation. He was asymptomatic. On Long-acting Metoprolol outpatient, whic has been on hold since admission. 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 Patient admitted to [facility] on 10.3.23 for EP Evaluation. He's been monitored on Tele Floor with no issues. BP Stable. EP Services consulted for Pacemaker Device Evaluation.     PMH: Hypertension, Hyperlipidemia, Vitamin D Deficiency, Spinal Disease/Fusion Surgery  PSH: Knee Arthroscopy, Cervical Laminectomy with Spinal Fusion, Colonoscopy, Umbilical Hernia Repair  Family History: Father- Hypertension/Heart Disease, Mother- RA  Social History: Tobacco- Negative, Alcohol- Negative, Substance Abuse- Marijuana Use     Previous Cardiac Diagnostics:   Echocardiogram (10.3.23):  Left Ventricle: The left ventricle is normal in size. Normal wall thickness. Normal wall motion. There is normal systolic function with a visually estimated ejection fraction of 65 - 70%.  Left Atrium: Left atrium is mildly dilated.  Right Ventricle: Normal right ventricular cavity size. Systolic function is normal.  Aortic Valve: The aortic valve is a trileaflet valve. There is normal leaflet mobility.  Pulmonary Artery: The  estimated pulmonary artery systolic pressure is 20 mmHg.  IVC/SVC: Normal venous pressure at 3 mmHg.     Review of patient's allergies indicates:  No Known Allergies     Review of patient's allergies indicates:  No Known Allergies    Review of Systems   Constitutional:  Negative for fever.   Respiratory:  Negative for chest tightness and shortness of breath.    Cardiovascular:  Negative for chest pain.   Neurological:  Negative for dizziness, syncope and light-headedness.   All other systems reviewed and are negative.    Indwelling Lines/Drains at time of discharge:  Lines/Drains/Airways     None                 Hospital Course: Please see Above.  No notes on file    Goals of Care Treatment Preferences:  Code Status: Full Code    Telemetry:  CHB with Junctional Escape Rhythm     Physical Exam  Vitals and nursing note reviewed.   Constitutional:       General: He is not in acute distress.     Appearance: Normal appearance. He is not ill-appearing.   HENT:      Head: Normocephalic.      Mouth/Throat:      Mouth: Mucous membranes are moist.      Pharynx: Oropharynx is clear.   Cardiovascular:      Rate and Rhythm: Bradycardia present.      Heart sounds: Normal heart sounds.   Pulmonary:      Effort: Pulmonary effort is normal. No respiratory distress.      Breath sounds: Normal breath sounds.   Abdominal:      Palpations: Abdomen is soft.   Musculoskeletal:         General: Normal range of motion.      Cervical back: Neck supple.   Skin:     General: Skin is warm and dry.   Neurological:      General: No focal deficit present.      Mental Status: He is alert and oriented to person, place, and time. Mental status is at baseline.   Psychiatric:         Mood and Affect: Mood normal.         Behavior: Behavior normal.     Significant Diagnostic Studies: Labs:   BMP:   Recent Labs   Lab 10/05/23  0529      K 4.8   CO2 23   BUN 12.7   CREATININE 0.84   CALCIUM 9.4   MG 2.40   , CMP   Recent Labs   Lab 10/05/23  0529       K 4.8   CO2 23   BUN 12.7   CREATININE 0.84   CALCIUM 9.4   , CBC   Recent Labs   Lab 10/05/23  0529   WBC 7.28   HGB 14.7   HCT 43.9      , INR   Lab Results   Component Value Date    INR 0.9 07/31/2023   , Lipid Panel   Lab Results   Component Value Date    CHOL 166 06/06/2023    HDL 31 (L) 06/06/2023    TRIG 135 06/06/2023   , Troponin   Recent Labs   Lab 10/03/23  1158   TROPONINI 0.017   , A1C:   Recent Labs   Lab 06/06/23  1110 07/28/23  1001   HGBA1C 5.8 5.5    and All labs within the past 24 hours have been reviewed    Pending Diagnostic Studies:     Procedure Component Value Units Date/Time    EKG 12-lead [[id number]]     Order Status: Sent Lab Status: No result     X-Ray Chest AP Portable [[id number]]     Order Status: Sent Lab Status: No result           Final Active Diagnoses:    Diagnosis Date Noted POA    PRINCIPAL PROBLEM:  AV block [I44.30] 10/05/2023 Yes      Problems Resolved During this Admission:     No new Assessment & Plan notes have been filed under this hospital service since the last note was generated.  Service: Cardiology    Impression:  Complete Heart Block with Junctional Escape Rhythm - Status Post Pacemaker Placement (10.5.23)     - EF 65-70%    - TSH Normal  Hypertension (BP Stable)  Hyperlipidemia  Cervical Spinal Disease    - Status Post C3 through 6 laminectomy with medial facetectomy and posterior fusion (7.31.23)  Elevated BMI/Obesity  Vitamin D Deficiency     Plan:  Resume Home Medications   Doxycycline 100 Mg PO BID x 5 Days  LUE Precautions/Activity Restrictions Discussed  DC Home Today  Home Percocet for Pain if Needed  Follow up with [facility] ([place]). Will need Device/Wound Check in 1 week.    Discharged Condition: good    Disposition: Home or Self Care    Follow Up:   Follow-up Information     Gerardo Guillaume MD Follow up.    Specialty: Cardiology  Why: Hospital Follow Up, Needs Device/Wound Check (Post Pacemaker Placement on  10.5.23).  Contact information:  [address] [place] [address] [place] [id number]  [phone]                       Patient Instructions:      Diet Cardiac     Lifting restrictions   Order Comments: 1.) Avoid Extreme Range of Motion with Left Arm (Above Shoulder Level) x 4 Weeks.  2.) Utilize Sling to Left Arm During Sleep & Nap Hours x 4 Weeks.  3.) Take Doxycycline 100 Mg By Mouth Twice Daily x 5 Days (Surgical Prophylaxis).  4.) Avoid Driving x 1 Week.  5.) Keep Left Chest Wall Incision Clean Dry Open to Air. Avoid Direct Water Exposure to Incision Site.     Notify your health care provider if you experience any of the following:  severe uncontrolled pain     Notify your health care provider if you experience any of the following:  redness, tenderness, or signs of infection (pain, swelling, redness, odor or green/yellow discharge around incision site)     Activity as tolerated   Order Comments: DISCHARGE PLAN:  Discharge: Home  Discharge Diet: Cardiac, Low Sodium  Discharge Condition: Stable  Discharge Activity:  As Tolerated, No Heavy Lifting > 5 lbs., Notify MD with Symptoms of Bleed/Infection    Discharge Implant Device Instructions:  Sling to be Used when you go to bed or try to take naps for 1 week post implant.   Do NOT raise elbow above shoulder height for 4 weeks post Implant.  Do NOT lift more than 10-15 Pounds for 4 Weeks.  Do NOT Drive for 1 Week Post Device Implant.     Medications:  Reconciled Home Medications:      Medication List      START taking these medications    doxycycline 100 MG Cap  Commonly known as: VIBRAMYCIN  Take 1 capsule (100 mg total) by mouth every 12 (twelve) hours. for 5 days     hydroCHLOROthiazide 25 MG tablet  Commonly known as: HYDRODIURIL  Take 1 tablet (25 mg total) by mouth once daily.     lisinopriL 40 MG tablet  Commonly known as: PRINIVIL,ZESTRIL  Take 1 tablet (40 mg total) by mouth once daily.  Start taking on: October 6, 2023        CHANGE how you take these  medications    gabapentin 300 MG capsule  Commonly known as: NEURONTIN  Take 1 capsule (300 mg total) by mouth 3 (three) times daily.  What changed:   · how much to take  · when to take this        CONTINUE taking these medications    atorvastatin 10 MG tablet  Commonly known as: LIPITOR  Take 1 tablet (10 mg total) by mouth every evening.     docusate sodium 100 MG capsule  Commonly known as: COLACE  Take 100 mg by mouth 2 (two) times daily.     methocarbamoL 750 MG Tab  Commonly known as: ROBAXIN  Take 1 tablet (750 mg total) by mouth every 8 (eight) hours as needed (muscle spasms).     metoprolol succinate 50 MG 24 hr tablet  Commonly known as: TOPROL-XL  Take 1 tablet (50 mg total) by mouth once daily.     oxyCODONE-acetaminophen  mg per tablet  Commonly known as: PERCOCET  Take 1 tablet by mouth every 8 (eight) hours as needed for Pain.     TYLENOL 8 HOUR ORAL  Take by mouth.        ASK your doctor about these medications    tadalafiL 10 MG tablet  Commonly known as: CIALIS  Take 1 tablet (10 mg total) by mouth daily as needed for Erectile Dysfunction.          Time spent on the discharge of patient: 31 Minutes    KATHLEEN Meeks  Cardiology  [facility] - 6th Floor Medical Telemetry

## 2023-10-05 NOTE — CONSULTS
Consults    [facility] 6th Floor Medical Telemetry    Cardiology  EP Consult Note    Patient Name: Corey Montano  MRN: [id number]  Admission Date: 10/3/2023  Hospital Length of Stay: 2 days  Code Status: No Order   Attending Provider: Marek Lopez MD   Consulting Provider: KATHLEEN Meeks  Primary Care Physician: Jere Suazo FNP  Principal Problem:<principal problem not specified>    Patient information was obtained from patient, past medical records, ER records, and primary team.     Subjective:   Consultation Reason: Complete Heart Block Evaluation for Pacemaker Placement    HPI:   Mr. Montano is a 67 year old male, known to [facility] through this hospitalization, who presented to the hospital from [facility] ER due to bradycardia on 10.3.23. He was evaluated in outpatient medicine clinic on 10.3.23, found to be bradycardic by pulse check and sent to the ER for evaluation. He was asymptomatic. On Long-acting Metoprolol outpatient, whic has been on hold since admission. 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 Patient admitted to [facility] on 10.3.23 for EP Evaluation. He's been monitored on Tele Floor with no issues. BP Stable. EP Services consulted for Pacemaker Device Evaluation.     PMH: Hypertension, Hyperlipidemia, Vitamin D Deficiency, Spinal Disease/Fusion Surgery  PSH: Knee Arthroscopy, Cervical Laminectomy with Spinal Fusion, Colonoscopy, Umbilical Hernia Repair  Family History: Father- Hypertension/Heart Disease, Mother- RA  Social History: Tobacco- Negative, Alcohol- Negative, Substance Abuse- Marijuana Use     Previous Cardiac Diagnostics:   Echocardiogram (10.3.23):  Left Ventricle: The left ventricle is normal in size. Normal wall thickness. Normal wall motion. There is normal systolic function with a visually estimated ejection fraction of 65 - 70%.  Left Atrium: Left atrium is mildly dilated.  Right Ventricle: Normal right ventricular cavity size.  Systolic function is normal.  Aortic Valve: The aortic valve is a trileaflet valve. There is normal leaflet mobility.  Pulmonary Artery: The estimated pulmonary artery systolic pressure is 20 mmHg.  IVC/SVC: Normal venous pressure at 3 mmHg.     Review of patient's allergies indicates:  No Known Allergies     Review of patient's allergies indicates:  No Known Allergies    No current facility-administered medications on file prior to encounter.     Current Outpatient Medications on File Prior to Encounter   Medication Sig    acetaminophen (TYLENOL 8 HOUR ORAL) Take by mouth.    atorvastatin (LIPITOR) 10 MG tablet Take 1 tablet (10 mg total) by mouth every evening.    docusate sodium (COLACE) 100 MG capsule Take 100 mg by mouth 2 (two) times daily.    gabapentin (NEURONTIN) 300 MG capsule Take 1 capsule (300 mg total) by mouth 3 (three) times daily. (Patient taking differently: Take 400 mg by mouth 2 (two) times daily.)    methocarbamoL (ROBAXIN) 750 MG Tab Take 1 tablet (750 mg total) by mouth every 8 (eight) hours as needed (muscle spasms).    metoprolol succinate (TOPROL-XL) 50 MG 24 hr tablet Take 1 tablet (50 mg total) by mouth once daily.    oxyCODONE-acetaminophen (PERCOCET)  mg per tablet Take 1 tablet by mouth every 8 (eight) hours as needed for Pain.    tadalafiL (CIALIS) 10 MG tablet Take 1 tablet (10 mg total) by mouth daily as needed for Erectile Dysfunction. (Patient not taking: Reported on 10/3/2023)     Review of Systems   Constitutional:  Negative for fever.   Respiratory:  Negative for chest tightness and shortness of breath.    Cardiovascular:  Negative for chest pain.   Neurological:  Negative for dizziness, syncope and light-headedness.   All other systems reviewed and are negative.    Objective:     Vital Signs (Most Recent):  Temp: 97.4 °F (36.3 °C) (10/05/23 0809)  Pulse: (!) 34 (10/05/23 0809)  Resp: 18 (10/05/23 0809)  BP: (!) 141/63 (10/05/23 0809)  SpO2: 97 % (10/05/23 0809) Vital  Signs (24h Range):  Temp:  [97.4 °F (36.3 °C)-98.2 °F (36.8 °C)] 97.4 °F (36.3 °C)  Pulse:  [33-43] 34  Resp:  [16-18] 18  SpO2:  [95 %-100 %] 97 %  BP: (134-174)/(51-63) 141/63     Weight: 114.9 kg (253 lb 4.9 oz)  Body mass index is 32.52 kg/m².    SpO2: 97 %         Intake/Output Summary (Last 24 hours) at 10/5/2023 1035  Last data filed at 10/5/2023 0710  Gross per 24 hour   Intake 500 ml   Output 475 ml   Net 25 ml       Lines/Drains/Airways       Peripheral Intravenous Line  Duration                  Peripheral IV - Single Lumen 20 G Left Antecubital -- days         Peripheral IV - Single Lumen 10/03/23 20 G Right Antecubital 2 days                  Significant Labs:  Recent Results (from the past 72 hour(s))   Comprehensive metabolic panel    Collection Time: 10/03/23 11:58 AM   Result Value Ref Range    Sodium Level 139 136 - 145 mmol/L    Potassium Level 4.2 3.5 - 5.1 mmol/L    Chloride 104 98 - 107 mmol/L    Carbon Dioxide 23 23 - 31 mmol/L    Glucose Level 105 82 - 115 mg/dL    Blood Urea Nitrogen 20.4 8.4 - 25.7 mg/dL    Creatinine 0.93 0.73 - 1.18 mg/dL    Calcium Level Total 10.0 8.8 - 10.0 mg/dL    Protein Total 7.5 5.8 - 7.6 gm/dL    Albumin Level 4.0 3.4 - 4.8 g/dL    Globulin 3.5 2.4 - 3.5 gm/dL    Albumin/Globulin Ratio 1.1 1.1 - 2.0 ratio    Bilirubin Total 0.9 <=1.5 mg/dL    Alkaline Phosphatase 100 40 - 150 unit/L    Alanine Aminotransferase 40 0 - 55 unit/L    Aspartate Aminotransferase 26 5 - 34 unit/L    eGFR >60 mls/min/1.73/m2   Magnesium    Collection Time: 10/03/23 11:58 AM   Result Value Ref Range    Magnesium Level 2.30 1.60 - 2.60 mg/dL   Troponin I    Collection Time: 10/03/23 11:58 AM   Result Value Ref Range    Troponin-I 0.017 0.000 - 0.045 ng/mL   CBC with Differential    Collection Time: 10/03/23 11:58 AM   Result Value Ref Range    WBC 8.23 4.50 - 11.50 x10(3)/mcL    RBC 4.90 4.70 - 6.10 x10(6)/mcL    Hgb 15.3 14.0 - 18.0 g/dL    Hct 46.3 42.0 - 52.0 %    MCV 94.5 (H) 80.0 -  94.0 fL    MCH 31.2 (H) 27.0 - 31.0 pg    MCHC 33.0 33.0 - 36.0 g/dL    RDW 13.3 11.5 - 17.0 %    Platelet 234 130 - 400 x10(3)/mcL    MPV 13.0 (H) 7.4 - 10.4 fL    Neut % 53.0 %    Lymph % 32.6 %    Mono % 11.2 %    Eos % 1.9 %    Basophil % 0.9 %    Lymph # 2.68 0.6 - 4.6 x10(3)/mcL    Neut # 4.37 2.1 - 9.2 x10(3)/mcL    Mono # 0.92 0.1 - 1.3 x10(3)/mcL    Eos # 0.16 0 - 0.9 x10(3)/mcL    Baso # 0.07 <=0.2 x10(3)/mcL    IG# 0.03 0 - 0.04 x10(3)/mcL    IG% 0.4 %    NRBC% 0.0 %   TSH    Collection Time: 10/03/23 11:58 AM   Result Value Ref Range    TSH 1.789 0.350 - 4.940 uIU/mL   Echo    Collection Time: 10/03/23  3:10 PM   Result Value Ref Range    BSA 2.47 m2    LVOT stroke volume 98.65 cm3    LVIDd 5.49 3.5 - 6.0 cm    LV Systolic Volume 47.56 mL    LV Systolic Volume Index 19.7 mL/m2    LVIDs 3.40 2.1 - 4.0 cm    LV Diastolic Volume 146.62 mL    LV Diastolic Volume Index 60.59 mL/m2    IVS 1.02 0.6 - 1.1 cm    LVOT diameter 2.39 cm    LVOT area 4.5 cm2    FS 38 28 - 44 %    Left Ventricle Relative Wall Thickness 0.40 cm    Posterior Wall 1.09 0.6 - 1.1 cm    LV mass 228.16 g    LV Mass Index 94 g/m2    MV Peak E Mark 0.89 m/s    TDI LATERAL 0.07 m/s    TDI SEPTAL 0.07 m/s    E/E' ratio 12.71 m/s    MV Peak A Mark 0.77 m/s    TR Max Mark 2.06 m/s    E/A ratio 1.16     E wave deceleration time 196.87 msec    LV SEPTAL E/E' RATIO 12.71 m/s    LV LATERAL E/E' RATIO 12.71 m/s    LVOT peak mark 0.99 m/s    Left Ventricular Outflow Tract Mean Velocity 0.66 cm/s    Left Ventricular Outflow Tract Mean Gradient 1.99 mmHg    LA size 4.26 cm    RVDD 3.41 cm    AV mean gradient 5 mmHg    AV peak gradient 11 mmHg    Ao peak mark 1.67 m/s    Ao VTI 41.90 cm    LVOT peak VTI 22.00 cm    AV valve area 2.35 cm²    AV Velocity Ratio 0.59     AV index (prosthetic) 0.53     DORIS by Velocity Ratio 2.66 cm²    MV mean gradient 2 mmHg    MV peak gradient 5 mmHg    MV stenosis pressure 1/2 time 57.09 ms    MV valve area p 1/2 method 3.85 cm2     MV valve area by continuity eq 3.16 cm2    MV VTI 31.2 cm    Triscuspid Valve Regurgitation Peak Gradient 17 mmHg    Mean e' 0.07 m/s    ZLVIDS -5.52     ZLVIDD -7.34     TV resting pulmonary artery pressure 20 mmHg    RV TB RVSP 5 mmHg    Est. RA pres 3 mmHg   Basic Metabolic Panel    Collection Time: 10/05/23  5:29 AM   Result Value Ref Range    Sodium Level 141 136 - 145 mmol/L    Potassium Level 4.8 3.5 - 5.1 mmol/L    Chloride 110 (H) 98 - 107 mmol/L    Carbon Dioxide 23 23 - 31 mmol/L    Glucose Level 112 82 - 115 mg/dL    Blood Urea Nitrogen 12.7 8.4 - 25.7 mg/dL    Creatinine 0.84 0.73 - 1.18 mg/dL    BUN/Creatinine Ratio 15     Calcium Level Total 9.4 8.8 - 10.0 mg/dL    Anion Gap 8.0 mEq/L    eGFR >60 mls/min/1.73/m2   Magnesium    Collection Time: 10/05/23  5:29 AM   Result Value Ref Range    Magnesium Level 2.40 1.60 - 2.60 mg/dL   CBC with Differential    Collection Time: 10/05/23  5:29 AM   Result Value Ref Range    WBC 7.28 4.50 - 11.50 x10(3)/mcL    RBC 4.70 4.70 - 6.10 x10(6)/mcL    Hgb 14.7 14.0 - 18.0 g/dL    Hct 43.9 42.0 - 52.0 %    MCV 93.4 80.0 - 94.0 fL    MCH 31.3 (H) 27.0 - 31.0 pg    MCHC 33.5 33.0 - 36.0 g/dL    RDW 13.5 11.5 - 17.0 %    Platelet 218 130 - 400 x10(3)/mcL    MPV 12.6 (H) 7.4 - 10.4 fL    Neut % 53.9 %    Lymph % 30.8 %    Mono % 10.7 %    Eos % 3.3 %    Basophil % 1.0 %    Lymph # 2.24 0.6 - 4.6 x10(3)/mcL    Neut # 3.93 2.1 - 9.2 x10(3)/mcL    Mono # 0.78 0.1 - 1.3 x10(3)/mcL    Eos # 0.24 0 - 0.9 x10(3)/mcL    Baso # 0.07 <=0.2 x10(3)/mcL    IG# 0.02 0 - 0.04 x10(3)/mcL    IG% 0.3 %    NRBC% 0.0 %     Significant Imaging:  Chest Radiograph (10.3.23):  FINDINGS:  No focal opacification, pleural effusion, or pneumothorax.  The cardiomediastinal silhouette is within normal limits.  No acute osseous abnormality.  Impression:  No acute cardiopulmonary process.    EKG:        Telemetry:  CHB with Junctional Escape Rhythm    Physical Exam  Vitals and nursing note reviewed.    Constitutional:       General: He is not in acute distress.     Appearance: Normal appearance. He is not ill-appearing.   HENT:      Head: Normocephalic.      Mouth/Throat:      Mouth: Mucous membranes are moist.      Pharynx: Oropharynx is clear.   Cardiovascular:      Rate and Rhythm: Bradycardia present.      Heart sounds: Normal heart sounds.   Pulmonary:      Effort: Pulmonary effort is normal. No respiratory distress.      Breath sounds: Normal breath sounds.   Abdominal:      Palpations: Abdomen is soft.   Musculoskeletal:         General: Normal range of motion.      Cervical back: Neck supple.   Skin:     General: Skin is warm and dry.   Neurological:      General: No focal deficit present.      Mental Status: He is alert and oriented to person, place, and time. Mental status is at baseline.   Psychiatric:         Mood and Affect: Mood normal.         Behavior: Behavior normal.         Home Medications:   No current facility-administered medications on file prior to encounter.     Current Outpatient Medications on File Prior to Encounter   Medication Sig Dispense Refill    acetaminophen (TYLENOL 8 HOUR ORAL) Take by mouth.      atorvastatin (LIPITOR) 10 MG tablet Take 1 tablet (10 mg total) by mouth every evening. 90 tablet 1    docusate sodium (COLACE) 100 MG capsule Take 100 mg by mouth 2 (two) times daily.      gabapentin (NEURONTIN) 300 MG capsule Take 1 capsule (300 mg total) by mouth 3 (three) times daily. (Patient taking differently: Take 400 mg by mouth 2 (two) times daily.) 90 capsule 0    methocarbamoL (ROBAXIN) 750 MG Tab Take 1 tablet (750 mg total) by mouth every 8 (eight) hours as needed (muscle spasms). 60 tablet 1    metoprolol succinate (TOPROL-XL) 50 MG 24 hr tablet Take 1 tablet (50 mg total) by mouth once daily. 90 tablet 1    oxyCODONE-acetaminophen (PERCOCET)  mg per tablet Take 1 tablet by mouth every 8 (eight) hours as needed for Pain. 40 tablet 0    tadalafiL (CIALIS) 10 MG  tablet Take 1 tablet (10 mg total) by mouth daily as needed for Erectile Dysfunction. (Patient not taking: Reported on 10/3/2023) 10 tablet 1     Current Inpatient Medications:    Current Facility-Administered Medications:     atorvastatin tablet 10 mg, 10 mg, Oral, QHS, Temitope Estrada, ALBAROP, 10 mg at 10/04/23 2138    atropine injection 0.5 mg, 0.5 mg, Intravenous, PRN, Temitope Estrada, FNP    docusate sodium capsule 100 mg, 100 mg, Oral, BID, Natalie, Rein T, FNP, 100 mg at 10/05/23 0910    famotidine tablet 20 mg, 20 mg, Oral, BID, Natalie, Rein T, FNP, 20 mg at 10/05/23 0910    lisinopriL tablet 40 mg, 40 mg, Oral, Daily, Natalie, Rein T, FNP, 40 mg at 10/05/23 0910    VTE Risk Mitigation (From admission, onward)      None          I,Moiz Perez MD,performed the substantive portion of this visit. I had a face-to-face time with the patient on 10/5/23. I reviewed and agree with the nurse practitioner's history, physical exam.     Medical decision making:       Assessment/Plan:   Impression:  Complete Heart Block with Junctional Escape Rhythm      - EF 65-70%    - TSH Normal  Hypertension (BP Stable)  Hyperlipidemia  Cervical Spinal Disease    - Status Post C3 through 6 laminectomy with medial facetectomy and posterior fusion (7.31.23)  Elevated BMI/Obesity  Vitamin D Deficiency    Plan:  Continue to Hold all SA/AV Stan Blocking Agents  Plan PPM Placement Today   Risks/Benefits/Alternatives of the Pacemaker discussed with the patient. He elects to proceed. Consent signed and on chart.     Thank you for your consult.     KATHLEEN Meeks  Cardiology  [facility] - 6th Floor Medical Telemetry  10/05/2023 10:35 AM

## 2023-10-05 NOTE — PROGRESS NOTES
[facility] - 6th Floor Medical Telemetry    Cardiology  Progress Note    Patient Name: Corey Montano  MRN: [id number]  Admission Date: 10/3/2023  Hospital Length of Stay: 2 days  Code Status: No Order   Attending Physician: Marek Lopez MD   Primary Care Physician: Jere Suazo FNP  Expected Discharge Date:   Principal Problem:<principal problem not specified>    Subjective:   Chief Complaint:  Bradycardia     HPI:   Mr. Montano is a 67 year old male, unknown to [facility], who presented to the hospital from [facility] ER due to bradycardia. He was evaluated in outpatient medicine clinic on 10.3.23, found to be bradycardic by pulse check and sent to the ER for evaluation. He was asymptomatic. On Long-acting Metoprolol outpatient, now on hold. 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 Patient admitted to [facility] for EP Evaluation.    Hospital Course:   10.4.23: NAD Noted. Denies Syncope, dizziness, CP. HR in the 30's-40's. BP Stable.   10.5.23: NAD Noted. Denies Syncope, dizziness, CP. HR in the 30's-40's. BP Stable.      PMH: Hypertension, Hyperlipidemia, Vitamin D Deficiency, Spinal Disease/Fusion Surgery  PSH: Knee Arthroscopy, Cervical Laminectomy with Spinal Fusion, Colonoscopy, Umbilical Hernia Repair  Family History: Father- Hypertension/Heart Disease, Mother- RA  Social History: Tobacco- Negative, Alcohol- Negative, Substance Abuse- Marijuana Use     Previous Cardiac Diagnostics:   Echocardiogram (10.3.23):  Left Ventricle: The left ventricle is normal in size. Normal wall thickness. Normal wall motion. There is normal systolic function with a visually estimated ejection fraction of 65 - 70%.  Left Atrium: Left atrium is mildly dilated.  Right Ventricle: Normal right ventricular cavity size. Systolic function is normal.  Aortic Valve: The aortic valve is a trileaflet valve. There is normal leaflet mobility.  Pulmonary Artery: The estimated pulmonary artery systolic  pressure is 20 mmHg.  IVC/SVC: Normal venous pressure at 3 mmHg.     Review of patient's allergies indicates:  No Known Allergies    Review of Systems   Cardiovascular:  Negative for chest pain.   Respiratory:  Negative for shortness of breath.    All other systems reviewed and are negative.    Objective:     Vital Signs (Most Recent):  Temp: 97.4 °F (36.3 °C) (10/05/23 0809)  Pulse: (!) 34 (10/05/23 0809)  Resp: 18 (10/05/23 0809)  BP: (!) 141/63 (10/05/23 0809)  SpO2: 97 % (10/05/23 0809) Vital Signs (24h Range):  Temp:  [97.4 °F (36.3 °C)-98.2 °F (36.8 °C)] 97.4 °F (36.3 °C)  Pulse:  [33-43] 34  Resp:  [16-18] 18  SpO2:  [95 %-100 %] 97 %  BP: (134-174)/(51-63) 141/63     Weight: 114.9 kg (253 lb 4.9 oz)  Body mass index is 32.52 kg/m².    SpO2: 97 %         Intake/Output Summary (Last 24 hours) at 10/5/2023 1033  Last data filed at 10/5/2023 0710  Gross per 24 hour   Intake 500 ml   Output 475 ml   Net 25 ml         Lines/Drains/Airways       Peripheral Intravenous Line  Duration                  Peripheral IV - Single Lumen 20 G Left Antecubital -- days         Peripheral IV - Single Lumen 10/03/23 20 G Right Antecubital 2 days                  Significant Labs:   Recent Results (from the past 72 hour(s))   Comprehensive metabolic panel    Collection Time: 10/03/23 11:58 AM   Result Value Ref Range    Sodium Level 139 136 - 145 mmol/L    Potassium Level 4.2 3.5 - 5.1 mmol/L    Chloride 104 98 - 107 mmol/L    Carbon Dioxide 23 23 - 31 mmol/L    Glucose Level 105 82 - 115 mg/dL    Blood Urea Nitrogen 20.4 8.4 - 25.7 mg/dL    Creatinine 0.93 0.73 - 1.18 mg/dL    Calcium Level Total 10.0 8.8 - 10.0 mg/dL    Protein Total 7.5 5.8 - 7.6 gm/dL    Albumin Level 4.0 3.4 - 4.8 g/dL    Globulin 3.5 2.4 - 3.5 gm/dL    Albumin/Globulin Ratio 1.1 1.1 - 2.0 ratio    Bilirubin Total 0.9 <=1.5 mg/dL    Alkaline Phosphatase 100 40 - 150 unit/L    Alanine Aminotransferase 40 0 - 55 unit/L    Aspartate Aminotransferase 26 5 - 34  unit/L    eGFR >60 mls/min/1.73/m2   Magnesium    Collection Time: 10/03/23 11:58 AM   Result Value Ref Range    Magnesium Level 2.30 1.60 - 2.60 mg/dL   Troponin I    Collection Time: 10/03/23 11:58 AM   Result Value Ref Range    Troponin-I 0.017 0.000 - 0.045 ng/mL   CBC with Differential    Collection Time: 10/03/23 11:58 AM   Result Value Ref Range    WBC 8.23 4.50 - 11.50 x10(3)/mcL    RBC 4.90 4.70 - 6.10 x10(6)/mcL    Hgb 15.3 14.0 - 18.0 g/dL    Hct 46.3 42.0 - 52.0 %    MCV 94.5 (H) 80.0 - 94.0 fL    MCH 31.2 (H) 27.0 - 31.0 pg    MCHC 33.0 33.0 - 36.0 g/dL    RDW 13.3 11.5 - 17.0 %    Platelet 234 130 - 400 x10(3)/mcL    MPV 13.0 (H) 7.4 - 10.4 fL    Neut % 53.0 %    Lymph % 32.6 %    Mono % 11.2 %    Eos % 1.9 %    Basophil % 0.9 %    Lymph # 2.68 0.6 - 4.6 x10(3)/mcL    Neut # 4.37 2.1 - 9.2 x10(3)/mcL    Mono # 0.92 0.1 - 1.3 x10(3)/mcL    Eos # 0.16 0 - 0.9 x10(3)/mcL    Baso # 0.07 <=0.2 x10(3)/mcL    IG# 0.03 0 - 0.04 x10(3)/mcL    IG% 0.4 %    NRBC% 0.0 %   TSH    Collection Time: 10/03/23 11:58 AM   Result Value Ref Range    TSH 1.789 0.350 - 4.940 uIU/mL   Echo    Collection Time: 10/03/23  3:10 PM   Result Value Ref Range    BSA 2.47 m2    LVOT stroke volume 98.65 cm3    LVIDd 5.49 3.5 - 6.0 cm    LV Systolic Volume 47.56 mL    LV Systolic Volume Index 19.7 mL/m2    LVIDs 3.40 2.1 - 4.0 cm    LV Diastolic Volume 146.62 mL    LV Diastolic Volume Index 60.59 mL/m2    IVS 1.02 0.6 - 1.1 cm    LVOT diameter 2.39 cm    LVOT area 4.5 cm2    FS 38 28 - 44 %    Left Ventricle Relative Wall Thickness 0.40 cm    Posterior Wall 1.09 0.6 - 1.1 cm    LV mass 228.16 g    LV Mass Index 94 g/m2    MV Peak E Mark 0.89 m/s    TDI LATERAL 0.07 m/s    TDI SEPTAL 0.07 m/s    E/E' ratio 12.71 m/s    MV Peak A Mark 0.77 m/s    TR Max Mark 2.06 m/s    E/A ratio 1.16     E wave deceleration time 196.87 msec    LV SEPTAL E/E' RATIO 12.71 m/s    LV LATERAL E/E' RATIO 12.71 m/s    LVOT peak mark 0.99 m/s    Left Ventricular  Outflow Tract Mean Velocity 0.66 cm/s    Left Ventricular Outflow Tract Mean Gradient 1.99 mmHg    LA size 4.26 cm    RVDD 3.41 cm    AV mean gradient 5 mmHg    AV peak gradient 11 mmHg    Ao peak annie 1.67 m/s    Ao VTI 41.90 cm    LVOT peak VTI 22.00 cm    AV valve area 2.35 cm²    AV Velocity Ratio 0.59     AV index (prosthetic) 0.53     DORIS by Velocity Ratio 2.66 cm²    MV mean gradient 2 mmHg    MV peak gradient 5 mmHg    MV stenosis pressure 1/2 time 57.09 ms    MV valve area p 1/2 method 3.85 cm2    MV valve area by continuity eq 3.16 cm2    MV VTI 31.2 cm    Triscuspid Valve Regurgitation Peak Gradient 17 mmHg    Mean e' 0.07 m/s    ZLVIDS -5.52     ZLVIDD -7.34     TV resting pulmonary artery pressure 20 mmHg    RV TB RVSP 5 mmHg    Est. RA pres 3 mmHg   Basic Metabolic Panel    Collection Time: 10/05/23  5:29 AM   Result Value Ref Range    Sodium Level 141 136 - 145 mmol/L    Potassium Level 4.8 3.5 - 5.1 mmol/L    Chloride 110 (H) 98 - 107 mmol/L    Carbon Dioxide 23 23 - 31 mmol/L    Glucose Level 112 82 - 115 mg/dL    Blood Urea Nitrogen 12.7 8.4 - 25.7 mg/dL    Creatinine 0.84 0.73 - 1.18 mg/dL    BUN/Creatinine Ratio 15     Calcium Level Total 9.4 8.8 - 10.0 mg/dL    Anion Gap 8.0 mEq/L    eGFR >60 mls/min/1.73/m2   Magnesium    Collection Time: 10/05/23  5:29 AM   Result Value Ref Range    Magnesium Level 2.40 1.60 - 2.60 mg/dL   CBC with Differential    Collection Time: 10/05/23  5:29 AM   Result Value Ref Range    WBC 7.28 4.50 - 11.50 x10(3)/mcL    RBC 4.70 4.70 - 6.10 x10(6)/mcL    Hgb 14.7 14.0 - 18.0 g/dL    Hct 43.9 42.0 - 52.0 %    MCV 93.4 80.0 - 94.0 fL    MCH 31.3 (H) 27.0 - 31.0 pg    MCHC 33.5 33.0 - 36.0 g/dL    RDW 13.5 11.5 - 17.0 %    Platelet 218 130 - 400 x10(3)/mcL    MPV 12.6 (H) 7.4 - 10.4 fL    Neut % 53.9 %    Lymph % 30.8 %    Mono % 10.7 %    Eos % 3.3 %    Basophil % 1.0 %    Lymph # 2.24 0.6 - 4.6 x10(3)/mcL    Neut # 3.93 2.1 - 9.2 x10(3)/mcL    Mono # 0.78 0.1 - 1.3  x10(3)/mcL    Eos # 0.24 0 - 0.9 x10(3)/mcL    Baso # 0.07 <=0.2 x10(3)/mcL    IG# 0.02 0 - 0.04 x10(3)/mcL    IG% 0.3 %    NRBC% 0.0 %     Telemetry:  Complete Heart Block HR 30's    Physical Exam  Vitals and nursing note reviewed.   Constitutional:       General: He is not in acute distress.     Appearance: Normal appearance. He is not ill-appearing.   HENT:      Head: Normocephalic.      Mouth/Throat:      Mouth: Mucous membranes are moist.      Pharynx: Oropharynx is clear.   Cardiovascular:      Rate and Rhythm: Bradycardia present.      Heart sounds: Normal heart sounds.   Pulmonary:      Effort: Pulmonary effort is normal. No respiratory distress.      Breath sounds: Normal breath sounds. No wheezing or rales.   Abdominal:      General: There is no distension.      Palpations: Abdomen is soft.      Tenderness: There is no abdominal tenderness. There is no guarding.   Musculoskeletal:         General: Normal range of motion.      Cervical back: Neck supple.   Skin:     General: Skin is warm and dry.   Neurological:      General: No focal deficit present.      Mental Status: He is alert and oriented to person, place, and time. Mental status is at baseline.      Motor: No weakness.   Psychiatric:         Mood and Affect: Mood normal.         Behavior: Behavior normal.         Thought Content: Thought content normal.         Judgment: Judgment normal.       Current Inpatient Medications:    Current Facility-Administered Medications:     atorvastatin tablet 10 mg, 10 mg, Oral, QHS, Natalie, Rein T, FNP, 10 mg at 10/04/23 2138    atropine injection 0.5 mg, 0.5 mg, Intravenous, PRN, Natalie, Rein T, FNP    docusate sodium capsule 100 mg, 100 mg, Oral, BID, Natalie, Rein T, FNP, 100 mg at 10/05/23 0910    famotidine tablet 20 mg, 20 mg, Oral, BID, Natalie, Rein T, FNP, 20 mg at 10/05/23 0910    lisinopriL tablet 40 mg, 40 mg, Oral, Daily, Natalie, Rein T, FNP, 40 mg at 10/05/23 0910    VTE Risk Mitigation (From admission,  onward)      None          Imaging:  Chest Radiograph (10.3.23):  FINDINGS:  No focal opacification, pleural effusion, or pneumothorax.  The cardiomediastinal silhouette is within normal limits.  No acute osseous abnormality.  Impression:  No acute cardiopulmonary process.     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 Perez (He is aware of case)  Risks/Benefits/Alternatives of the Pacemaker discussed with the patient. He elects to proceed. Consent signed and on chart.     Temitope Estrada, KATHLEEN  Cardiology  [facility] - 6th Floor Medical Telemetry  10/05/2023     I have seen the patient, reviewed the Nurse Practitioner's note, assessment and plan. I have personally interviewed and examined the patient at bedside and agree with the findings. Medical decision making listed above were done under my guidance.    Physical exam:  Cardiovascular system: regular rhythm, no murmur.  Lungs: CTAB.  Extremities: No leg edema.    Plan:  Needs pacer

## 2023-10-06 ENCOUNTER — PATIENT OUTREACH (OUTPATIENT)
Dept: ADMINISTRATIVE | Facility: CLINIC | Age: 68
End: 2023-10-06
Payer: MEDICARE

## 2023-10-06 NOTE — PROGRESS NOTES
C3 nurse spoke with Corey Montano  for a TCC post hospital discharge follow up call. The patient has a scheduled HOSFU appointment with Moiz Perez MD (Cardiology) on 10/13/2023 @ 2:20 PM for pacemaker / wound check & Gerardo Guillaume MD (Cardiology) on 10/16/2023 @ 1:30 PM with Taya Chinchilla NP.

## 2023-11-03 DIAGNOSIS — I10 HYPERTENSION, UNSPECIFIED TYPE: ICD-10-CM

## 2023-11-03 DIAGNOSIS — E78.5 HYPERLIPIDEMIA, UNSPECIFIED HYPERLIPIDEMIA TYPE: ICD-10-CM

## 2023-11-03 RX ORDER — METOPROLOL SUCCINATE 50 MG/1
50 TABLET, EXTENDED RELEASE ORAL
Qty: 90 TABLET | Refills: 1 | Status: SHIPPED | OUTPATIENT
Start: 2023-11-03 | End: 2023-11-13 | Stop reason: HOSPADM

## 2023-11-03 RX ORDER — ATORVASTATIN CALCIUM 10 MG/1
10 TABLET, FILM COATED ORAL NIGHTLY
Qty: 90 TABLET | Refills: 1 | Status: SHIPPED | OUTPATIENT
Start: 2023-11-03

## 2023-11-06 ENCOUNTER — TELEPHONE (OUTPATIENT)
Dept: NEUROSURGERY | Facility: CLINIC | Age: 68
End: 2023-11-06
Payer: MEDICARE

## 2023-11-06 NOTE — TELEPHONE ENCOUNTER
Returned pt's call, pt stated that he would like to reschedule his appointment with  on tomorrow due to him having some transportation issues. I stated to pt that 's last day here in [place] is tomorrow and the appointment will have to rescheduled with juan. I rescheduled pt to see Juan on Friday 11/10 at 11:00 and xrays at 10:00. Pt confirmed and voiced understanding

## 2023-11-10 ENCOUNTER — HOSPITAL ENCOUNTER (OUTPATIENT)
Dept: RADIOLOGY | Facility: HOSPITAL | Age: 68
Discharge: HOME | End: 2023-11-10
Attending: NEUROLOGICAL SURGERY
Payer: MEDICARE

## 2023-11-10 ENCOUNTER — OFFICE VISIT (OUTPATIENT)
Dept: NEUROSURGERY | Facility: CLINIC | Age: 68
End: 2023-11-10
Payer: MEDICARE

## 2023-11-10 VITALS
DIASTOLIC BLOOD PRESSURE: 75 MMHG | HEART RATE: 69 BPM | WEIGHT: 253.31 LBS | SYSTOLIC BLOOD PRESSURE: 124 MMHG | HEIGHT: 74 IN | BODY MASS INDEX: 32.51 KG/M2

## 2023-11-10 DIAGNOSIS — M51.26 HNP (HERNIATED NUCLEUS PULPOSUS), LUMBAR: ICD-10-CM

## 2023-11-10 DIAGNOSIS — Z98.1 S/P CERVICAL SPINAL FUSION: ICD-10-CM

## 2023-11-10 DIAGNOSIS — M47.26 OTHER SPONDYLOSIS WITH RADICULOPATHY, LUMBAR REGION: ICD-10-CM

## 2023-11-10 DIAGNOSIS — M48.061 SPINAL STENOSIS, LUMBAR REGION WITHOUT NEUROGENIC CLAUDICATION: ICD-10-CM

## 2023-11-10 DIAGNOSIS — G95.9 CERVICAL MYELOPATHY: ICD-10-CM

## 2023-11-10 DIAGNOSIS — M54.16 LUMBAR RADICULOPATHY: ICD-10-CM

## 2023-11-10 DIAGNOSIS — M43.16 SPONDYLOLISTHESIS, LUMBAR REGION: ICD-10-CM

## 2023-11-10 DIAGNOSIS — Z98.1 S/P CERVICAL SPINAL FUSION: Primary | ICD-10-CM

## 2023-11-10 DIAGNOSIS — G89.29 CHRONIC LEFT-SIDED LOW BACK PAIN WITH LEFT-SIDED SCIATICA: ICD-10-CM

## 2023-11-10 DIAGNOSIS — M54.42 CHRONIC LEFT-SIDED LOW BACK PAIN WITH LEFT-SIDED SCIATICA: ICD-10-CM

## 2023-11-10 DIAGNOSIS — M51.36 DDD (DEGENERATIVE DISC DISEASE), LUMBAR: ICD-10-CM

## 2023-11-10 PROCEDURE — 3078F PR MOST RECENT DIASTOLIC BLOOD PRESSURE < 80 MM HG: ICD-10-PCS | Mod: CPTII,S$GLB,, | Performed by: PHYSICIAN ASSISTANT

## 2023-11-10 PROCEDURE — 3288F FALL RISK ASSESSMENT DOCD: CPT | Mod: CPTII,S$GLB,, | Performed by: PHYSICIAN ASSISTANT

## 2023-11-10 PROCEDURE — 3008F BODY MASS INDEX DOCD: CPT | Mod: CPTII,S$GLB,, | Performed by: PHYSICIAN ASSISTANT

## 2023-11-10 PROCEDURE — 72040 XR CERVICAL SPINE AP LATERAL: ICD-10-PCS | Mod: 26,,, | Performed by: RADIOLOGY

## 2023-11-10 PROCEDURE — 3074F PR MOST RECENT SYSTOLIC BLOOD PRESSURE < 130 MM HG: ICD-10-PCS | Mod: CPTII,S$GLB,, | Performed by: PHYSICIAN ASSISTANT

## 2023-11-10 PROCEDURE — 3044F HG A1C LEVEL LT 7.0%: CPT | Mod: CPTII,S$GLB,, | Performed by: PHYSICIAN ASSISTANT

## 2023-11-10 PROCEDURE — 72040 X-RAY EXAM NECK SPINE 2-3 VW: CPT | Mod: TC,FY

## 2023-11-10 PROCEDURE — 1100F PR PT FALLS ASSESS DOC 2+ FALLS/FALL W/INJURY/YR: ICD-10-PCS | Mod: CPTII,S$GLB,, | Performed by: PHYSICIAN ASSISTANT

## 2023-11-10 PROCEDURE — 3044F PR MOST RECENT HEMOGLOBIN A1C LEVEL <7.0%: ICD-10-PCS | Mod: CPTII,S$GLB,, | Performed by: PHYSICIAN ASSISTANT

## 2023-11-10 PROCEDURE — 1125F AMNT PAIN NOTED PAIN PRSNT: CPT | Mod: CPTII,S$GLB,, | Performed by: PHYSICIAN ASSISTANT

## 2023-11-10 PROCEDURE — 4010F PR ACE/ARB THEARPY RXD/TAKEN: ICD-10-PCS | Mod: CPTII,S$GLB,, | Performed by: PHYSICIAN ASSISTANT

## 2023-11-10 PROCEDURE — 3288F PR FALLS RISK ASSESSMENT DOCUMENTED: ICD-10-PCS | Mod: CPTII,S$GLB,, | Performed by: PHYSICIAN ASSISTANT

## 2023-11-10 PROCEDURE — 1160F PR REVIEW ALL MEDS BY PRESCRIBER/CLIN PHARMACIST DOCUMENTED: ICD-10-PCS | Mod: CPTII,S$GLB,, | Performed by: PHYSICIAN ASSISTANT

## 2023-11-10 PROCEDURE — 1125F PR PAIN SEVERITY QUANTIFIED, PAIN PRESENT: ICD-10-PCS | Mod: CPTII,S$GLB,, | Performed by: PHYSICIAN ASSISTANT

## 2023-11-10 PROCEDURE — 1160F RVW MEDS BY RX/DR IN RCRD: CPT | Mod: CPTII,S$GLB,, | Performed by: PHYSICIAN ASSISTANT

## 2023-11-10 PROCEDURE — 3074F SYST BP LT 130 MM HG: CPT | Mod: CPTII,S$GLB,, | Performed by: PHYSICIAN ASSISTANT

## 2023-11-10 PROCEDURE — 4010F ACE/ARB THERAPY RXD/TAKEN: CPT | Mod: CPTII,S$GLB,, | Performed by: PHYSICIAN ASSISTANT

## 2023-11-10 PROCEDURE — 1159F MED LIST DOCD IN RCRD: CPT | Mod: CPTII,S$GLB,, | Performed by: PHYSICIAN ASSISTANT

## 2023-11-10 PROCEDURE — 1159F PR MEDICATION LIST DOCUMENTED IN MEDICAL RECORD: ICD-10-PCS | Mod: CPTII,S$GLB,, | Performed by: PHYSICIAN ASSISTANT

## 2023-11-10 PROCEDURE — 99999 PR PBB SHADOW E&M-EST. PATIENT-LVL V: ICD-10-PCS | Mod: PBBFAC,,, | Performed by: PHYSICIAN ASSISTANT

## 2023-11-10 PROCEDURE — 99999 PR PBB SHADOW E&M-EST. PATIENT-LVL V: CPT | Mod: PBBFAC,,, | Performed by: PHYSICIAN ASSISTANT

## 2023-11-10 PROCEDURE — 3008F PR BODY MASS INDEX (BMI) DOCUMENTED: ICD-10-PCS | Mod: CPTII,S$GLB,, | Performed by: PHYSICIAN ASSISTANT

## 2023-11-10 PROCEDURE — 99214 OFFICE O/P EST MOD 30 MIN: CPT | Mod: S$GLB,,, | Performed by: PHYSICIAN ASSISTANT

## 2023-11-10 PROCEDURE — 99214 PR OFFICE/OUTPT VISIT, EST, LEVL IV, 30-39 MIN: ICD-10-PCS | Mod: S$GLB,,, | Performed by: PHYSICIAN ASSISTANT

## 2023-11-10 PROCEDURE — 1100F PTFALLS ASSESS-DOCD GE2>/YR: CPT | Mod: CPTII,S$GLB,, | Performed by: PHYSICIAN ASSISTANT

## 2023-11-10 PROCEDURE — 72040 X-RAY EXAM NECK SPINE 2-3 VW: CPT | Mod: 26,,, | Performed by: RADIOLOGY

## 2023-11-10 PROCEDURE — 3078F DIAST BP <80 MM HG: CPT | Mod: CPTII,S$GLB,, | Performed by: PHYSICIAN ASSISTANT

## 2023-11-12 DIAGNOSIS — M54.16 LUMBAR RADICULOPATHY: Primary | ICD-10-CM

## 2023-11-13 ENCOUNTER — TELEPHONE (OUTPATIENT)
Dept: PAIN MEDICINE | Facility: CLINIC | Age: 68
End: 2023-11-13
Payer: MEDICARE

## 2023-11-13 ENCOUNTER — PATIENT MESSAGE (OUTPATIENT)
Dept: PAIN MEDICINE | Facility: CLINIC | Age: 68
End: 2023-11-13
Payer: MEDICARE

## 2023-11-13 ENCOUNTER — OFFICE VISIT (OUTPATIENT)
Dept: INTERNAL MEDICINE | Facility: CLINIC | Age: 68
End: 2023-11-13
Payer: MEDICARE

## 2023-11-13 VITALS
DIASTOLIC BLOOD PRESSURE: 72 MMHG | WEIGHT: 265.38 LBS | SYSTOLIC BLOOD PRESSURE: 136 MMHG | TEMPERATURE: 97 F | BODY MASS INDEX: 34.06 KG/M2 | HEIGHT: 74 IN | HEART RATE: 70 BPM

## 2023-11-13 DIAGNOSIS — M54.9 DORSALGIA: ICD-10-CM

## 2023-11-13 DIAGNOSIS — Z00.00 WELLNESS EXAMINATION: ICD-10-CM

## 2023-11-13 DIAGNOSIS — I10 PRIMARY HYPERTENSION: ICD-10-CM

## 2023-11-13 DIAGNOSIS — I44.30 AV BLOCK: Primary | ICD-10-CM

## 2023-11-13 PROCEDURE — 1126F AMNT PAIN NOTED NONE PRSNT: CPT | Mod: CPTII,,, | Performed by: NURSE PRACTITIONER

## 2023-11-13 PROCEDURE — 1159F MED LIST DOCD IN RCRD: CPT | Mod: CPTII,,, | Performed by: NURSE PRACTITIONER

## 2023-11-13 PROCEDURE — 3044F PR MOST RECENT HEMOGLOBIN A1C LEVEL <7.0%: ICD-10-PCS | Mod: CPTII,,, | Performed by: NURSE PRACTITIONER

## 2023-11-13 PROCEDURE — 3044F HG A1C LEVEL LT 7.0%: CPT | Mod: CPTII,,, | Performed by: NURSE PRACTITIONER

## 2023-11-13 PROCEDURE — 1160F PR REVIEW ALL MEDS BY PRESCRIBER/CLIN PHARMACIST DOCUMENTED: ICD-10-PCS | Mod: CPTII,,, | Performed by: NURSE PRACTITIONER

## 2023-11-13 PROCEDURE — 3075F PR MOST RECENT SYSTOLIC BLOOD PRESS GE 130-139MM HG: ICD-10-PCS | Mod: CPTII,,, | Performed by: NURSE PRACTITIONER

## 2023-11-13 PROCEDURE — 3288F PR FALLS RISK ASSESSMENT DOCUMENTED: ICD-10-PCS | Mod: CPTII,,, | Performed by: NURSE PRACTITIONER

## 2023-11-13 PROCEDURE — 4010F ACE/ARB THERAPY RXD/TAKEN: CPT | Mod: CPTII,,, | Performed by: NURSE PRACTITIONER

## 2023-11-13 PROCEDURE — 3078F DIAST BP <80 MM HG: CPT | Mod: CPTII,,, | Performed by: NURSE PRACTITIONER

## 2023-11-13 PROCEDURE — 99214 PR OFFICE/OUTPT VISIT, EST, LEVL IV, 30-39 MIN: ICD-10-PCS | Mod: S$PBB,,, | Performed by: NURSE PRACTITIONER

## 2023-11-13 PROCEDURE — 99214 OFFICE O/P EST MOD 30 MIN: CPT | Mod: PBBFAC | Performed by: NURSE PRACTITIONER

## 2023-11-13 PROCEDURE — 3288F FALL RISK ASSESSMENT DOCD: CPT | Mod: CPTII,,, | Performed by: NURSE PRACTITIONER

## 2023-11-13 PROCEDURE — 1126F PR PAIN SEVERITY QUANTIFIED, NO PAIN PRESENT: ICD-10-PCS | Mod: CPTII,,, | Performed by: NURSE PRACTITIONER

## 2023-11-13 PROCEDURE — 1159F PR MEDICATION LIST DOCUMENTED IN MEDICAL RECORD: ICD-10-PCS | Mod: CPTII,,, | Performed by: NURSE PRACTITIONER

## 2023-11-13 PROCEDURE — 3008F PR BODY MASS INDEX (BMI) DOCUMENTED: ICD-10-PCS | Mod: CPTII,,, | Performed by: NURSE PRACTITIONER

## 2023-11-13 PROCEDURE — 3078F PR MOST RECENT DIASTOLIC BLOOD PRESSURE < 80 MM HG: ICD-10-PCS | Mod: CPTII,,, | Performed by: NURSE PRACTITIONER

## 2023-11-13 PROCEDURE — 3008F BODY MASS INDEX DOCD: CPT | Mod: CPTII,,, | Performed by: NURSE PRACTITIONER

## 2023-11-13 PROCEDURE — 99214 OFFICE O/P EST MOD 30 MIN: CPT | Mod: S$PBB,,, | Performed by: NURSE PRACTITIONER

## 2023-11-13 PROCEDURE — 1101F PR PT FALLS ASSESS DOC 0-1 FALLS W/OUT INJ PAST YR: ICD-10-PCS | Mod: CPTII,,, | Performed by: NURSE PRACTITIONER

## 2023-11-13 PROCEDURE — 4010F PR ACE/ARB THEARPY RXD/TAKEN: ICD-10-PCS | Mod: CPTII,,, | Performed by: NURSE PRACTITIONER

## 2023-11-13 PROCEDURE — 1101F PT FALLS ASSESS-DOCD LE1/YR: CPT | Mod: CPTII,,, | Performed by: NURSE PRACTITIONER

## 2023-11-13 PROCEDURE — 3075F SYST BP GE 130 - 139MM HG: CPT | Mod: CPTII,,, | Performed by: NURSE PRACTITIONER

## 2023-11-13 PROCEDURE — 1160F RVW MEDS BY RX/DR IN RCRD: CPT | Mod: CPTII,,, | Performed by: NURSE PRACTITIONER

## 2023-11-13 NOTE — ASSESSMENT & PLAN NOTE
DASH  Continue current medications with exception of BB which was previously stopped due to AV block
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
## 2023-11-13 NOTE — TELEPHONE ENCOUNTER
----- Message from Julio César Mckinley MD sent at 11/12/2023  7:11 PM CST -----  Pain Provider: Elías  Patient Name: Corey Montano  MRN: [id number]  Case: LUMBAR [id number]  Level: L4 and L5  Laterality: left      Patient can follow up with neurosurgery 3 weeks after procedure

## 2023-11-14 NOTE — PROGRESS NOTES
Subjective:     Patient ID:  Corey Montano is a 67 y.o. male.    Prudencio    Chief Complaint:  3 month postop follow-up posterior cervical fusion.  Back and left leg pain      Date of Procedure: 7/31/2023      Pre-Operative Diagnosis: Cervical myelopathy [G95.9]     Post-Operative Diagnosis: Post-Op Diagnosis Codes:     * Cervical myelopathy [G95.9]     Anesthesia: General     Procedures performed:  C3 through 6 laminectomy with medial facetectomy and posterior fusion.     Surgeon: Steven Clark MD     Assistant: Carmine Gil MD, resident        HPI    Corey Montano is a 67 y.o. male who presents for follow up.  Patient states from a neck standpoint he is doing very well.  He denies any neck pain.  No arm pain or paresthesias and no hand paresthesias.  He was in physical therapy for a bit that was working on his balance and gait and this was getting better but is still not back to normal.  He did not finish the full course of PT because he had to have a pacemaker implanted.  He walks with a cane or walker still.  Patient denies any difficulty with fine motor skills and is not dropping things.    The main thing that is bothering him at this time is pain in the back with radiation down the left posterior lateral leg.  The pain goes all the way to the foot.  He has numbness in both feet.  The pain is worse with sitting and lying down and better with walking.  He denies any right leg pain or paresthesias.    He takes gabapentin 400mg BID and robaxin PRN.    Patient denies any recent accidents or trauma, no saddle anesthesias, and no bowel or bladder incontinence.    Review of Systems:  Constitution: Negative for chills, fever, night sweats and weight loss.   Musculoskeletal: Negative for falls.   Gastrointestinal: Negative for bowel incontinence, nausea and vomiting.   Genitourinary: Negative for bladder incontinence.   Neurological: Negative for disturbances in coordination and loss of balance.      Objective:     "  Vitals:    11/10/23 1049   BP: 124/75   Pulse: 69   Weight: 114.9 kg (253 lb 4.9 oz)   Height: 6' 2" (1.88 m)   PainSc:   6   PainLoc: Back       Physical Exam:      General:  Corey Montano is well-developed, well-nourished, appears stated age, in no acute distress, alert and oriented to person, place, and time.    Pulmonary/Chest:  Respiratory effort normal  Abdominal: Exhibits no distension  Psychiatric:  Normal mood and affect.  Behavior is normal.  Judgement and thought content normal      Musculoskeletal:    Patient is slightly off balance with a broad based gait.    Cervical ROM:   No pain in cervical spine flexion, extension, left rotation, and right rotation, left lateral bending, and right lateral bending.    Cervical Spine Inspection:  Healed posterior surgical scars with no visible rashes.    Cervical Spine Palpation:  No tenderness to cervical spine palpation.    Lumbar ROM:   Mild pain in lumbar flexion, extension, left lateral bending, and right lateral bending.    Lumbar Spine Inspection:  Normal with no surgical scars with no visible rashes.    Lumbar Spine Palpation:  No tenderness to low back palpation.    SI Joint Palpation:  No tenderness to SI Joint palpation.    Straight Leg Raise:  Negative right and positive left SLR.      Neurological:     Muscle strength against resistance:     Right Left   Deltoid  5 / 5 5 / 5   Biceps  5 / 5 5 / 5   Triceps 5 / 5 5 / 5   Wrist flexion  5 / 5 5 / 5   Wrist extension 5 / 5 5 / 5   Finger abduction 5 / 5 5 / 5   Thumb opposition 5 / 5 5 / 5   Handgrip 5 / 5 5 / 5   Hip flexion  5 / 5 5 / 5   Hip extension 5 / 5 5 / 5   Hip abduction 5 / 5 5 / 5   Hip adduction 5/ 5 5 / 5   Knee extension  5 / 5 5 / 5   Knee flexion  5 / 5 5 / 5   Dorsiflexion  5 / 5 5 / 5   EHL  5 / 5 5 / 5   Plantar flexion  5 / 5 5 / 5   Inversion of the feet 5 / 5 5 / 5   Eversion of the feet 5 / 5 5 / 5       Reflexes:     Right Left   Triceps 3+ 3+   Biceps 3+ 3+   Brachioradialis 3+ " 3+   Patellar 3+ 3+   Achilles 3+ 3+     Clonus:  Positive bilaterally  Vidal: Positive bilaterally    On gross examination of the bilateral upper and lower extremities, patient has no signs of clubbing, cyanosis, or edema.       XRAY/MRI Interpretation:     Cervical spine xrays were personally reviewed today.  No fractures. Hardware intact    Lumbar spine MRI was personally reviewed today from 2023.  HNP L4-5 and L5-S1 with bilateral facet hypertrophy and central and bl NFS.        Assessment:          1. S/P cervical spinal fusion    2. Chronic left-sided low back pain with left-sided sciatica    3. Other spondylosis with radiculopathy, lumbar region    4. DDD (degenerative disc disease), lumbar    5. Lumbar radiculopathy    6. HNP (herniated nucleus pulposus), lumbar    7. Spinal stenosis, lumbar region without neurogenic claudication    8. Spondylolisthesis, lumbar region    9. Cervical myelopathy            Plan:          Orders Placed This Encounter    CT Cervical Spine Without Contrast    X-Ray Cervical Spine AP And Lateral    Ambulatory referral/consult to Physical/Occupational Therapy    Ambulatory referral/consult to Pain Clinic    Procedure Request Order for Pain Management       3 month po PCF    -PT outside of [facility] for gait training and UE and LE strengthening exercises  -FU in 3 months with Dr. Flannery with CT cspine and xray      HNP L4-5 and L5-S1 with bilateral facet hypertrophy and central and bl NFS.    -Left L4 TF DENNYS and left L5 TF DENNYS with [facility]  -Increase gabapentin to 400mg TID  -Referral to pain clinic closer to home just in case he decides not to go to [place]      Follow-Up:  Follow up in about 3 months (around 2/10/2024). If there are any questions prior to this, the patient was instructed to contact the office.       Syeda Ceja, [facility], PA-C  Neurosurgery  [facility]  11/14/2023

## 2023-11-21 NOTE — PRE-PROCEDURE INSTRUCTIONS
Spoke with patient regarding procedure scheduled on 11.30     Arrival time 1030     Has patient been sick with fever or on antibiotics within the last 7 days? No     Does the patient have any open wounds, sores or rashes? No     Does the patient have any recent fractures? no     Has patient received a vaccination within the last 7 days? No     Received the COVID vaccination? yes     Has the patient stopped all medications as directed? na     Does patient have a pacemaker, defibrillator, or implantable stimulator? PACEMAKER     Does the patient have a ride to and from procedure and someone reliable to remain with patient?  SON     Is the patient diabetic? no     Does the patient have sleep apnea? Or use O2 at home? No and no     Is the patient receiving sedation? yes     Is the patient instructed to remain NPO beginning at midnight the night before their procedure? yes     Procedure location confirmed with patient? Yes     Covid- Denies signs/symptoms. Instructed to notify PAT/MD if any changes.

## 2023-11-30 ENCOUNTER — HOSPITAL ENCOUNTER (OUTPATIENT)
Facility: HOSPITAL | Age: 68
Discharge: HOME | End: 2023-11-30
Attending: ANESTHESIOLOGY | Admitting: ANESTHESIOLOGY
Payer: MEDICARE

## 2023-11-30 VITALS
SYSTOLIC BLOOD PRESSURE: 127 MMHG | BODY MASS INDEX: 34.64 KG/M2 | TEMPERATURE: 97 F | HEIGHT: 74 IN | OXYGEN SATURATION: 100 % | DIASTOLIC BLOOD PRESSURE: 71 MMHG | WEIGHT: 269.94 LBS | RESPIRATION RATE: 18 BRPM | HEART RATE: 77 BPM

## 2023-11-30 DIAGNOSIS — M54.16 LUMBAR RADICULOPATHY: Primary | ICD-10-CM

## 2023-11-30 PROCEDURE — 64484 PRA INJECT ANES/STEROID FORAMEN LUMBAR/SACRAL W IMG GUIDE ,EA ADD LEVEL: ICD-10-PCS | Mod: LT,,, | Performed by: ANESTHESIOLOGY

## 2023-11-30 PROCEDURE — 64484 NJX AA&/STRD TFRM EPI L/S EA: CPT | Mod: LT | Performed by: ANESTHESIOLOGY

## 2023-11-30 PROCEDURE — 64483 PR EPIDURAL INJ, ANES/STEROID, TRANSFORAMINAL, LUMB/SACR, SNGL LEVL: ICD-10-PCS | Mod: LT,,, | Performed by: ANESTHESIOLOGY

## 2023-11-30 PROCEDURE — 25500020 PHARM REV CODE 255: Performed by: ANESTHESIOLOGY

## 2023-11-30 PROCEDURE — 25000003 PHARM REV CODE 250: Performed by: ANESTHESIOLOGY

## 2023-11-30 PROCEDURE — 64483 NJX AA&/STRD TFRM EPI L/S 1: CPT | Mod: LT | Performed by: ANESTHESIOLOGY

## 2023-11-30 PROCEDURE — 63600175 PHARM REV CODE 636 W HCPCS: Performed by: ANESTHESIOLOGY

## 2023-11-30 PROCEDURE — 64484 NJX AA&/STRD TFRM EPI L/S EA: CPT | Mod: LT,,, | Performed by: ANESTHESIOLOGY

## 2023-11-30 PROCEDURE — 64483 NJX AA&/STRD TFRM EPI L/S 1: CPT | Mod: LT,,, | Performed by: ANESTHESIOLOGY

## 2023-11-30 RX ORDER — MIDAZOLAM HYDROCHLORIDE 1 MG/ML
INJECTION, SOLUTION INTRAMUSCULAR; INTRAVENOUS
Status: DISCONTINUED | OUTPATIENT
Start: 2023-11-30 | End: 2023-11-30 | Stop reason: HOSPADM

## 2023-11-30 RX ORDER — FENTANYL CITRATE 50 UG/ML
INJECTION, SOLUTION INTRAMUSCULAR; INTRAVENOUS
Status: DISCONTINUED | OUTPATIENT
Start: 2023-11-30 | End: 2023-11-30 | Stop reason: HOSPADM

## 2023-11-30 RX ORDER — ONDANSETRON 2 MG/ML
4 INJECTION INTRAMUSCULAR; INTRAVENOUS ONCE AS NEEDED
Status: DISCONTINUED | OUTPATIENT
Start: 2023-11-30 | End: 2023-11-30 | Stop reason: HOSPADM

## 2023-11-30 RX ORDER — BETAMETHASONE SODIUM PHOSPHATE AND BETAMETHASONE ACETATE 3; 3 MG/ML; MG/ML
INJECTION, SUSPENSION INTRA-ARTICULAR; INTRALESIONAL; INTRAMUSCULAR; SOFT TISSUE
Status: DISCONTINUED | OUTPATIENT
Start: 2023-11-30 | End: 2023-11-30 | Stop reason: HOSPADM

## 2023-11-30 RX ORDER — LIDOCAINE HYDROCHLORIDE 10 MG/ML
INJECTION, SOLUTION EPIDURAL; INFILTRATION; INTRACAUDAL; PERINEURAL
Status: DISCONTINUED | OUTPATIENT
Start: 2023-11-30 | End: 2023-11-30 | Stop reason: HOSPADM

## 2023-11-30 NOTE — OP NOTE
Transforaminal Lumbar Epidural Steroid Injection    INFORMED CONSENT: The procedure, risks, benefits and options were discussed with patient. There are no contraindications to the procedure. The patient expressed understanding and agreed to proceed. The personnel performing the procedure was discussed.    Date of procedure 11/30/2023    Time-out taken to identify patient and procedure side prior to starting the procedure.                     PROCEDURE:    1)  Left  L4/L5 TRANSFORAMINAL EPIDURAL STEROID INJECTION    2)  Left  L5/S1 TRANSFORAMINAL EPIDURAL STEROID INJECTION    Pre Procedure diagnosis:    Lumbar radiculopathy [M54.16]  1. Lumbar radiculopathy        Post-Procedure diagnosis:   same    Surgeon: Julio César Mckinley MD    Assistants: None    Medication: 2ml Betamethasone PF 6mg/ml and 2ml Lidocaine PF 1%    Local: 3ml Lidocaine PF 1%    Sedation: Conscious sedation provided by M.D    SEDATION MEDICATIONS: local/IV sedation: Versed 2 mg and fentanyl 75 mcg IV.  Conscious sedation ordered by MD.  Patient reevaluated and sedation administered by MD and monitored by RN.  Total sedation time was less than 10 min.    Total sedation time was <10 min    Complications: None    Specimens: None        TECHNIQUE: The patient was brought to the procedure room. IV access was obtained prior to the procedure. The patient was positioned prone on the fluoroscopy table. Continuous hemodynamic monitoring was initiated including blood pressure, EKG, and pulse oximetry. . The skin was prepped with chlorhexidine and draped in a sterile fashion.   Local Xylocaine was injected by raising a wheel and going down to the periosteum using a 27-gauge hypodermic needle.      The Left  L4/L5 and Left L5/S1 transforaminal spaces were identified with fluoroscopy in the  AP, oblique, and lateral views.  A 22 gauge spinal quinke needle was then advanced into the area of the trans foraminal spaces at the above levels with confirmation of proper  needle position using AP, oblique, and lateral fluoroscopic views. Once the needle tip was in the area of the transforaminal space, and there was no blood, CSF or paraesthesias,  2 mL of Omnipaque 300mg/ml was injected at each level for a total of 4 mL.  Fluoroscopic imaging in the AP and lateral views revealed a clear outline of the spinal nerve with proximal spread of agent through the neural foramen into the epidural space. A total combination of 1 mL of Lidocaine PF 1% and 1ml of Betamethasone PF 6mg/ml was injected into each level for a total of 4mL of injected medications with displacement of the contrast dye confirming that the medication went into the area of the transforaminal spaces at each level. A sterile dressing was applied. Patient tolerated procedure well.        The patient was monitored for approximately 30 minutes after the procedure.  Patient was given post procedure and discharge instructions to follow at home.  The patient was discharged in a stable condition

## 2023-11-30 NOTE — DISCHARGE INSTRUCTIONS

## 2023-11-30 NOTE — DISCHARGE SUMMARY
Discharge Note  Short Stay      SUMMARY     Admit Date: 11/30/2023    Attending Physician: Julio César Mckinley MD        Discharge Physician: Julio César Mckinley MD        Discharge Date: 11/30/2023 11:38 AM    Procedure(s) (LRB):  left  L4/5 + L5/S1 TF DENNYS (Left)    Final Diagnosis: Lumbar radiculopathy [M54.16]    Disposition: Home or self care    Patient Instructions:   Current Discharge Medication List        CONTINUE these medications which have NOT CHANGED    Details   acetaminophen (TYLENOL 8 HOUR ORAL) Take by mouth.      atorvastatin (LIPITOR) 10 MG tablet TAKE 1 TABLET BY MOUTH EVERY DAY IN THE EVENING  Qty: 90 tablet, Refills: 1    Associated Diagnoses: Hyperlipidemia, unspecified hyperlipidemia type      gabapentin (NEURONTIN) 300 MG capsule Take 1 capsule (300 mg total) by mouth 3 (three) times daily.  Qty: 90 capsule, Refills: 0    Associated Diagnoses: Dorsalgia, unspecified      hydroCHLOROthiazide (HYDRODIURIL) 25 MG tablet Take 1 tablet (25 mg total) by mouth once daily.  Qty: 30 tablet, Refills: 11    Comments: .      lisinopriL (PRINIVIL,ZESTRIL) 40 MG tablet Take 1 tablet (40 mg total) by mouth once daily.  Qty: 90 tablet, Refills: 3    Comments: .      methocarbamoL (ROBAXIN) 750 MG Tab Take 1 tablet (750 mg total) by mouth every 8 (eight) hours as needed (muscle spasms).  Qty: 60 tablet, Refills: 1      docusate sodium (COLACE) 100 MG capsule Take 100 mg by mouth 2 (two) times daily.                 Discharge Diagnosis: Lumbar radiculopathy [M54.16]  Condition on Discharge: Stable with no complications to procedure   Diet on Discharge: Same as before.  Activity: as per instruction sheet.  Discharge to: Home with a responsible adult.  Follow up: 2-4 weeks       Please call the office at [phone] if you experience any weakness or loss of sensation, fever > 101.5, pain uncontrolled with oral medications, persistent nausea/vomiting/or diarrhea, redness or drainage from the incisions, or any  other worrisome concerns. If physician on call was not reached or could not communicate with our office for any reason please go to the nearest emergency department

## 2023-11-30 NOTE — H&P
HPI  Patient presenting for Procedure(s) (LRB):  left  L4/5 + L5/S1 TF DENNYS (Left)     Patient on Anti-coagulation No    No health changes since previous encounter    History reviewed. No pertinent past medical history.  Past Surgical History:   Procedure Laterality Date    ARTHROSCOPY OF KNEE  1974    CERVICAL LAMINECTOMY WITH SPINAL FUSION N/A 7/31/2023    Procedure: LAMINECTOMY, SPINE, CERVICAL, WITH FUSION;  Surgeon: Steven Clark MD;  Location: [facility] OR;  Service: Neurosurgery;  Laterality: N/A;  prone  chest rolls  neuromonitoring  [place]  [place]  C3-C6    COLONOSCOPY  07/16/2021    INSERTION OF PACEMAKER N/A 10/5/2023    Procedure: INSERTION, PACEMAKER;  Surgeon: Moiz Perez MD;  Location: [facility] CATH LAB;  Service: Cardiology;  Laterality: N/A;    UMBILICAL HERNIA REPAIR       Review of patient's allergies indicates:  No Known Allergies     No current facility-administered medications on file prior to encounter.     Current Outpatient Medications on File Prior to Encounter   Medication Sig Dispense Refill    acetaminophen (TYLENOL 8 HOUR ORAL) Take by mouth.      atorvastatin (LIPITOR) 10 MG tablet TAKE 1 TABLET BY MOUTH EVERY DAY IN THE EVENING 90 tablet 1    gabapentin (NEURONTIN) 300 MG capsule Take 1 capsule (300 mg total) by mouth 3 (three) times daily. (Patient taking differently: Take 400 mg by mouth 2 (two) times daily.) 90 capsule 0    hydroCHLOROthiazide (HYDRODIURIL) 25 MG tablet Take 1 tablet (25 mg total) by mouth once daily. 30 tablet 11    lisinopriL (PRINIVIL,ZESTRIL) 40 MG tablet Take 1 tablet (40 mg total) by mouth once daily. 90 tablet 3    methocarbamoL (ROBAXIN) 750 MG Tab Take 1 tablet (750 mg total) by mouth every 8 (eight) hours as needed (muscle spasms). 60 tablet 1    docusate sodium (COLACE) 100 MG capsule Take 100 mg by mouth 2 (two) times daily.          PMHx, PSHx, Allergies, Medications reviewed in epic    ROS negative except pain complaints in HPI    OBJECTIVE:    BP (!)  "144/88 (BP Location: Right arm)   Pulse 88   Temp 96.6 °F (35.9 °C) (Temporal)   Resp 20   Ht 6' 2" (1.88 m)   Wt 122.4 kg (269 lb 15.3 oz)   SpO2 99%   BMI 34.66 kg/m²     PHYSICAL EXAMINATION:    GENERAL: Well appearing, in no acute distress, alert and oriented x3.  PSYCH:  Mood and affect appropriate.  SKIN: Skin color, texture, turgor normal, no rashes or lesions which will impact the procedure.  CV: RRR with palpation of the radial artery.  PULM: No evidence of respiratory difficulty, symmetric chest rise. Clear to auscultation.  NEURO: Cranial nerves grossly intact.    Plan:    Proceed with procedure as planned Procedure(s) (LRB):  left  L4/5 + L5/S1 TF DENNYS (Left)    Julio César Mckinley MD  11/30/2023            "

## 2024-02-06 ENCOUNTER — HOSPITAL ENCOUNTER (OUTPATIENT)
Dept: RADIOLOGY | Facility: HOSPITAL | Age: 69
Discharge: HOME | End: 2024-02-06
Attending: PHYSICIAN ASSISTANT
Payer: MEDICARE

## 2024-02-06 DIAGNOSIS — G95.9 CERVICAL MYELOPATHY: ICD-10-CM

## 2024-02-06 DIAGNOSIS — Z98.1 S/P CERVICAL SPINAL FUSION: ICD-10-CM

## 2024-02-06 PROCEDURE — 72125 CT NECK SPINE W/O DYE: CPT | Mod: TC

## 2024-02-12 ENCOUNTER — HOSPITAL ENCOUNTER (OUTPATIENT)
Dept: RADIOLOGY | Facility: HOSPITAL | Age: 69
Discharge: HOME | End: 2024-02-12
Attending: PHYSICIAN ASSISTANT
Payer: MEDICARE

## 2024-02-12 ENCOUNTER — OFFICE VISIT (OUTPATIENT)
Dept: NEUROSURGERY | Facility: CLINIC | Age: 69
End: 2024-02-12
Payer: MEDICARE

## 2024-02-12 VITALS
WEIGHT: 269.81 LBS | DIASTOLIC BLOOD PRESSURE: 83 MMHG | HEART RATE: 72 BPM | SYSTOLIC BLOOD PRESSURE: 130 MMHG | BODY MASS INDEX: 34.63 KG/M2 | HEIGHT: 74 IN

## 2024-02-12 DIAGNOSIS — G95.9 CERVICAL MYELOPATHY: ICD-10-CM

## 2024-02-12 DIAGNOSIS — Z98.1 S/P CERVICAL SPINAL FUSION: ICD-10-CM

## 2024-02-12 DIAGNOSIS — Z98.1 STATUS POST CERVICAL SPINAL FUSION: ICD-10-CM

## 2024-02-12 DIAGNOSIS — M48.062 SPINAL STENOSIS OF LUMBAR REGION WITH NEUROGENIC CLAUDICATION: Primary | ICD-10-CM

## 2024-02-12 DIAGNOSIS — M48.061 FORAMINAL STENOSIS OF LUMBAR REGION: ICD-10-CM

## 2024-02-12 PROBLEM — Z00.00 WELLNESS EXAMINATION: Status: RESOLVED | Noted: 2022-06-10 | Resolved: 2024-02-12

## 2024-02-12 PROCEDURE — 3288F FALL RISK ASSESSMENT DOCD: CPT | Mod: CPTII,S$GLB,, | Performed by: NEUROLOGICAL SURGERY

## 2024-02-12 PROCEDURE — 72040 X-RAY EXAM NECK SPINE 2-3 VW: CPT | Mod: 26,,, | Performed by: RADIOLOGY

## 2024-02-12 PROCEDURE — 1101F PT FALLS ASSESS-DOCD LE1/YR: CPT | Mod: CPTII,S$GLB,, | Performed by: NEUROLOGICAL SURGERY

## 2024-02-12 PROCEDURE — 4010F ACE/ARB THERAPY RXD/TAKEN: CPT | Mod: CPTII,S$GLB,, | Performed by: NEUROLOGICAL SURGERY

## 2024-02-12 PROCEDURE — 3008F BODY MASS INDEX DOCD: CPT | Mod: CPTII,S$GLB,, | Performed by: NEUROLOGICAL SURGERY

## 2024-02-12 PROCEDURE — 72040 X-RAY EXAM NECK SPINE 2-3 VW: CPT | Mod: TC,FY

## 2024-02-12 PROCEDURE — 3079F DIAST BP 80-89 MM HG: CPT | Mod: CPTII,S$GLB,, | Performed by: NEUROLOGICAL SURGERY

## 2024-02-12 PROCEDURE — 1126F AMNT PAIN NOTED NONE PRSNT: CPT | Mod: CPTII,S$GLB,, | Performed by: NEUROLOGICAL SURGERY

## 2024-02-12 PROCEDURE — 99999 PR PBB SHADOW E&M-EST. PATIENT-LVL III: CPT | Mod: PBBFAC,,, | Performed by: NEUROLOGICAL SURGERY

## 2024-02-12 PROCEDURE — 1159F MED LIST DOCD IN RCRD: CPT | Mod: CPTII,S$GLB,, | Performed by: NEUROLOGICAL SURGERY

## 2024-02-12 PROCEDURE — 99214 OFFICE O/P EST MOD 30 MIN: CPT | Mod: S$GLB,,, | Performed by: NEUROLOGICAL SURGERY

## 2024-02-12 PROCEDURE — 3075F SYST BP GE 130 - 139MM HG: CPT | Mod: CPTII,S$GLB,, | Performed by: NEUROLOGICAL SURGERY

## 2024-02-12 NOTE — PROGRESS NOTES
NEUROSURGICAL PROGRESS NOTE    DATE OF SERVICE:  02/12/2024    ATTENDING PHYSICIAN:  Kael Flannery MD    SUBJECTIVE:    INTERIM HISTORY:    This is a very pleasant 68 y.o. male, who is six months status post C3 through 6 laminectomy and posterior instrumented fusion for cervical spondylosis with myelopathy.  The patient also has lumbar stenosis with radiculopathy and neurogenic claudication.  He walks using a Rollator.  He has a tendency to lean forward.  Complaining of left more than right sciatica.  Has difficulty walking for long period of time.  Overall he is very satisfied with the outcome he had from his neck surgery.  He reports significant improvement in his upper extremity numbness and pain symptoms.  He recently had a transforaminal epidural steroid injection that gave him about 11 days of pain relief.              PAST MEDICAL HISTORY:  Active Ambulatory Problems     Diagnosis Date Noted    Hypertension 06/09/2022    Hyperlipidemia 06/09/2022    Vitamin D deficiency 06/09/2022    Obesity 06/09/2022    Arthritis 06/09/2022    Prediabetes 06/10/2022    Primary insomnia 06/10/2022    Erectile dysfunction 06/10/2022    Pain in both wrists 06/12/2023    Dorsalgia 06/12/2023    Grief 06/12/2023    Cauda equina syndrome 07/12/2023    Cervical myelopathy 08/02/2023    Bradycardia 10/03/2023    AV block 10/05/2023     Resolved Ambulatory Problems     Diagnosis Date Noted    Wellness examination 06/10/2022     No Additional Past Medical History       PAST SURGICAL HISTORY:  Past Surgical History:   Procedure Laterality Date    ARTHROSCOPY OF KNEE  1974    CERVICAL LAMINECTOMY WITH SPINAL FUSION N/A 7/31/2023    Procedure: LAMINECTOMY, SPINE, CERVICAL, WITH FUSION;  Surgeon: Steven Clark MD;  Location: [facility];  Service: Neurosurgery;  Laterality: N/A;  prone  chest rolls  neuromonitoring  DePuy  Randolph  C3-C6    COLONOSCOPY  07/16/2021    INSERTION OF PACEMAKER N/A 10/5/2023    Procedure: INSERTION,  PACEMAKER;  Surgeon: Moiz Perez MD;  Location: [facility] CATH LAB;  Service: Cardiology;  Laterality: N/A;    TRANSFORAMINAL EPIDURAL INJECTION OF STEROID Left 11/30/2023    Procedure: left  L4/5 + L5/S1 TF DENNYS;  Surgeon: Julio César Mckinley MD;  Location: [facility] PAIN MGT;  Service: Pain Management;  Laterality: Left;    UMBILICAL HERNIA REPAIR         SOCIAL HISTORY:   Social History     Socioeconomic History    Marital status:      Spouse name: Barbara    Number of children: 2   Tobacco Use    Smoking status: Never     Passive exposure: Never    Smokeless tobacco: Never   Substance and Sexual Activity    Alcohol use: Not Currently     Alcohol/week: 1.0 standard drink of alcohol     Types: 1 Cans of beer per week    Drug use: Not Currently     Types: Marijuana     Comment: Gummies    Sexual activity: Not Currently     Partners: Female     Social Determinants of Health     Financial Resource Strain: Low Risk  (7/17/2023)    Overall Financial Resource Strain (CARDIA)     Difficulty of Paying Living Expenses: Not very hard   Food Insecurity: No Food Insecurity (7/17/2023)    Hunger Vital Sign     Worried About Running Out of Food in the Last Year: Never true     Ran Out of Food in the Last Year: Never true   Transportation Needs: No Transportation Needs (7/17/2023)    PRAPARE - Transportation     Lack of Transportation (Medical): No     Lack of Transportation (Non-Medical): No   Physical Activity: Insufficiently Active (12/12/2022)    Exercise Vital Sign     Days of Exercise per Week: 2 days     Minutes of Exercise per Session: 30 min   Stress: Stress Concern Present (7/17/2023)    [employer] - Occupational Stress Questionnaire     Feeling of Stress : Very much   Social Connections: Moderately Isolated (7/17/2023)    Social Connection and Isolation Panel [NHANES]     Frequency of Communication with Friends and Family: More than three times a week     Frequency of Social Gatherings with  Friends and Family: More than three times a week     Attends [employer] Services: More than 4 times per year     Active Member of Clubs or Organizations: No     Attends Club or Organization Meetings: Never     Marital Status:    Housing Stability: Low Risk  (7/17/2023)    Housing Stability Vital Sign     Unable to Pay for Housing in the Last Year: No     Number of Places Lived in the Last Year: 1     Unstable Housing in the Last Year: No       FAMILY HISTORY:  Family History   Problem Relation Age of Onset    Dementia Mother     Rheum arthritis Mother     Heart disease Father     Hypertension Father        CURRENTS MEDICATIONS:  Current Outpatient Medications on File Prior to Visit   Medication Sig Dispense Refill    acetaminophen (TYLENOL 8 HOUR ORAL) Take by mouth.      atorvastatin (LIPITOR) 10 MG tablet TAKE 1 TABLET BY MOUTH EVERY DAY IN THE EVENING 90 tablet 1    docusate sodium (COLACE) 100 MG capsule Take 100 mg by mouth 2 (two) times daily.      hydroCHLOROthiazide (HYDRODIURIL) 25 MG tablet Take 1 tablet (25 mg total) by mouth once daily. 30 tablet 11    lisinopriL (PRINIVIL,ZESTRIL) 40 MG tablet Take 1 tablet (40 mg total) by mouth once daily. 90 tablet 3    methocarbamoL (ROBAXIN) 750 MG Tab Take 1 tablet (750 mg total) by mouth every 8 (eight) hours as needed (muscle spasms). 60 tablet 1    gabapentin (NEURONTIN) 300 MG capsule Take 1 capsule (300 mg total) by mouth 3 (three) times daily. (Patient taking differently: Take 400 mg by mouth 2 (two) times daily.) 90 capsule 0     No current facility-administered medications on file prior to visit.       ALLERGIES:  Review of patient's allergies indicates:  No Known Allergies    REVIEW OF SYSTEMS:  Review of Systems   Constitutional:  Negative for diaphoresis, fever and weight loss.   Respiratory:  Negative for shortness of breath.    Cardiovascular:  Negative for chest pain.   Gastrointestinal:  Negative for blood in stool.   Genitourinary:  Negative  for hematuria.   Endo/Heme/Allergies:  Does not bruise/bleed easily.   All other systems reviewed and are negative.        OBJECTIVE:    PHYSICAL EXAMINATION:   Vitals:    24 0948   BP: 130/83   Pulse: 72       Physical Exam:    Psych/Behavior: He is oriented to person, place, and time.     Neurological:        DTRs: Tricep reflexes are 0 on the right side and 0 on the left side. Bicep reflexes are 0 on the right side and 0 on the left side. Brachioradialis reflexes are 2+ on the right side and 0 on the left side. Patellar reflexes are 0 on the right side and 0 on the left side. Achilles reflexes are 0 on the right side and 0 on the left side.       Back Exam     Muscle Strength   Right Quadriceps:  5/5   Left Quadriceps:  5/5   Right Hamstrings:  5/5   Left Hamstrings:  5/5               Neurologic Exam     Mental Status   Oriented to person, place, and time.   Speech: speech is normal   Level of consciousness: alert    Cranial Nerves   Cranial nerves II through XII intact.     Motor Exam   Muscle bulk: normal  Overall muscle tone: normal    Strength   Right deltoid: 5/5  Left deltoid: 5/5  Right biceps: 5/5  Left biceps: 5/5  Right triceps: 5/5  Left triceps: 5/5  Right wrist flexion: 5/5  Left wrist flexion: 5/5  Right wrist extension: 5/5  Left wrist extension: 5/5  Right interossei: 5/5  Left interossei: 5/5  Right iliopsoas: 5/5  Left iliopsoas: 5/5  Right quadriceps: 5/5  Left quadriceps: 5/5  Right hamstrin/5  Left hamstrin/5  Right anterior tibial: 5/5  Left anterior tibial: 5/5  Right posterior tibial: 5/5  Left posterior tibial: 5/5  Right peroneal: 5/5  Left peroneal: 5/5  Right gastroc: 5/5  Left gastroc: 5/5    Sensory Exam   Light touch normal.   Pinprick normal.     Gait, Coordination, and Reflexes     Gait  Gait: (Leaning forward)    Coordination   Finger to nose coordination: normal    Reflexes   Right brachioradialis: 2+  Left brachioradialis: 0  Right biceps: 0  Left biceps:  0  Right triceps: 0  Left triceps: 0  Right patellar: 0  Left patellar: 0  Right achilles: 0  Left achilles: 0  Right plantar: normal  Left plantar: normal  Right Vidal: absent  Left Vidal: absent  Right ankle clonus: absent  Left ankle clonus: absent        DIAGNOSTIC DATA:  I personally interpreted the following imaging:   Cervical x-ray today shows C3 through 6 posterior instrumented fusion, consolidated  Lumbar spine MRI June 2023 was showing moderate-to-severe spinal stenosis at L1-2, severe foraminal stenosis bilaterally at L4-5, right more than left foraminal stenosis at L5-S1, significant facet arthropathy at L4-5 and L5-S1 with lateral recess stenosis    ASSESMENT:  This is a 68 y.o. male with     Problem List Items Addressed This Visit    None  Visit Diagnoses       Spinal stenosis of lumbar region with neurogenic claudication    -  Primary    Relevant Orders    X-Ray Scoliosis Complete    Foraminal stenosis of lumbar region        Status post cervical spinal fusion                  PLAN:  Follow up in June with scoliosis film  All questions answered    He has recovered well from his cervical fusion surgery  I think he would be a good candidate for a L4-5 oblique interbody fusion, L5-S1 anterior interbody fusion with L4-S1 posterior instrumentation and a L1-2 minimally invasive laminectomy, medial facetectomy, foraminotomy.  The goal of the surgery would be to treat the severe stenosis at L1-2 and neurogenic claudication symptoms.  The L4-5 and L5-S1 fusion is needed to treat the severe foraminal stenosis causing refractory radiculopathy symptoms at this patient.    More than 50% of the time was spent on discussing conservative management treatments (medication, physical therapy exercises) and possible interventions (spinal injections and surgical procedures). Care coordination was discussed.    30 min        Kael Flannery MD  Cell:[phone]

## 2024-03-10 ENCOUNTER — HOSPITAL ENCOUNTER (EMERGENCY)
Facility: HOSPITAL | Age: 69
Discharge: HOME | End: 2024-03-10
Attending: EMERGENCY MEDICINE
Payer: MEDICARE

## 2024-03-10 VITALS
RESPIRATION RATE: 20 BRPM | DIASTOLIC BLOOD PRESSURE: 86 MMHG | TEMPERATURE: 98 F | WEIGHT: 260 LBS | OXYGEN SATURATION: 100 % | HEART RATE: 75 BPM | BODY MASS INDEX: 34.46 KG/M2 | HEIGHT: 73 IN | SYSTOLIC BLOOD PRESSURE: 173 MMHG
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
## 2024-03-10 DIAGNOSIS — S30.811A ABRASION OF ABDOMINAL WALL, INITIAL ENCOUNTER: Primary | ICD-10-CM

## 2024-03-10 LAB
ALBUMIN SERPL-MCNC: 3.9 G/DL (ref 3.4–4.8)
ALBUMIN/GLOB SERPL: 1.4 RATIO (ref 1.1–2)
ALP SERPL-CCNC: 91 UNIT/L (ref 40–150)
ALT SERPL-CCNC: 34 UNIT/L (ref 0–55)
AST SERPL-CCNC: 27 UNIT/L (ref 5–34)
BASOPHILS # BLD AUTO: 0.05 X10(3)/MCL
BASOPHILS NFR BLD AUTO: 0.7 %
BILIRUB SERPL-MCNC: 0.5 MG/DL
BUN SERPL-MCNC: 20.9 MG/DL (ref 8.4–25.7)
CALCIUM SERPL-MCNC: 9.5 MG/DL (ref 8.8–10)
CHLORIDE SERPL-SCNC: 105 MMOL/L (ref 98–107)
CO2 SERPL-SCNC: 22 MMOL/L (ref 23–31)
CREAT SERPL-MCNC: 0.86 MG/DL (ref 0.73–1.18)
EOSINOPHIL # BLD AUTO: 0.16 X10(3)/MCL (ref 0–0.9)
EOSINOPHIL NFR BLD AUTO: 2.1 %
ERYTHROCYTE [DISTWIDTH] IN BLOOD BY AUTOMATED COUNT: 13.6 % (ref 11.5–17)
GFR SERPLBLD CREATININE-BSD FMLA CKD-EPI: >60 MLS/MIN/1.73/M2
GLOBULIN SER-MCNC: 2.7 GM/DL (ref 2.4–3.5)
GLUCOSE SERPL-MCNC: 117 MG/DL (ref 82–115)
HCT VFR BLD AUTO: 44.4 % (ref 42–52)
HGB BLD-MCNC: 15.2 G/DL (ref 14–18)
IMM GRANULOCYTES # BLD AUTO: 0.02 X10(3)/MCL (ref 0–0.04)
IMM GRANULOCYTES NFR BLD AUTO: 0.3 %
LYMPHOCYTES # BLD AUTO: 1.44 X10(3)/MCL (ref 0.6–4.6)
LYMPHOCYTES NFR BLD AUTO: 19 %
MCH RBC QN AUTO: 31.3 PG (ref 27–31)
MCHC RBC AUTO-ENTMCNC: 34.2 G/DL (ref 33–36)
MCV RBC AUTO: 91.4 FL (ref 80–94)
MONOCYTES # BLD AUTO: 0.58 X10(3)/MCL (ref 0.1–1.3)
MONOCYTES NFR BLD AUTO: 7.7 %
NEUTROPHILS # BLD AUTO: 5.33 X10(3)/MCL (ref 2.1–9.2)
NEUTROPHILS NFR BLD AUTO: 70.2 %
NRBC BLD AUTO-RTO: 0 %
PLATELET # BLD AUTO: 218 X10(3)/MCL (ref 130–400)
PMV BLD AUTO: 11.7 FL (ref 7.4–10.4)
POTASSIUM SERPL-SCNC: 4 MMOL/L (ref 3.5–5.1)
PROT SERPL-MCNC: 6.6 GM/DL (ref 5.8–7.6)
RBC # BLD AUTO: 4.86 X10(6)/MCL (ref 4.7–6.1)
SODIUM SERPL-SCNC: 139 MMOL/L (ref 136–145)
WBC # SPEC AUTO: 7.58 X10(3)/MCL (ref 4.5–11.5)

## 2024-03-10 PROCEDURE — 80053 COMPREHEN METABOLIC PANEL: CPT

## 2024-03-10 PROCEDURE — 90471 IMMUNIZATION ADMIN: CPT

## 2024-03-10 PROCEDURE — 85025 COMPLETE CBC W/AUTO DIFF WBC: CPT

## 2024-03-10 PROCEDURE — 90715 TDAP VACCINE 7 YRS/> IM: CPT

## 2024-03-10 PROCEDURE — 63600175 PHARM REV CODE 636 W HCPCS

## 2024-03-10 PROCEDURE — 99285 EMERGENCY DEPT VISIT HI MDM: CPT | Mod: 25

## 2024-03-10 PROCEDURE — 25500020 PHARM REV CODE 255: Performed by: EMERGENCY MEDICINE

## 2024-03-10 RX ORDER — METHOCARBAMOL 750 MG/1
750 TABLET, FILM COATED ORAL 3 TIMES DAILY PRN
Qty: 21 TABLET | Refills: 0 | Status: SHIPPED | OUTPATIENT
Start: 2024-03-10 | End: 2024-03-17

## 2024-03-10 RX ADMIN — TETANUS TOXOID, REDUCED DIPHTHERIA TOXOID AND ACELLULAR PERTUSSIS VACCINE, ADSORBED 0.5 ML: 5; 2.5; 8; 8; 2.5 SUSPENSION INTRAMUSCULAR at 01:03

## 2024-03-10 RX ADMIN — IOHEXOL 100 ML: 350 INJECTION, SOLUTION INTRAVENOUS at 02:03

## 2024-03-10 NOTE — ED NOTES
Bed: 11  Expected date:   Expected time:   Means of arrival:   Comments:  EMS- [facility], hit car door/step

## 2024-03-10 NOTE — ED PROVIDER NOTES
Encounter Date: 3/10/2024       History     Chief Complaint   Patient presents with    Fall     Pt was pushing his truck when he tripped on a concrete ledge and fell onto his abdomen. Denies being pinned between door of truck, ran over by vehicle, striking head, or LOC. No bruising to abdomen, no tender upon palpation. Circumferential abrasion to abdomen. Unknown last Tdap. 81 ASA daily     The patient is a 68 y.o. male with a history of hypertension who presents to the Emergency Department with a chief complaint of fall. Patient reports that he was pushing his truck up a hill with the door open when he tripped over a piece of concrete. States that he fell backwards but did not hit his head or neck. He states that open door of the truck hit his abdomen as the truck was rolling. He states that he did not hit his head or neck during this incident. States that he laid himself on the ground. He reports abrasion to his abdomen and some abdominal wall tenderness. Symptoms began just prior to arrival and have been constant since onset. His pain is currently rated as a 4/10 in severity and described as aching with no radiation. Associated symptoms include abrasion. Symptoms are aggravated with nothing and there are no alleviating factors. The patient denies head pain, neck pain, chest pain, or back pain. He reports taking nothing prior to arrival with no relief of symptoms. Unknown tdap. No other reported symptoms at this time     The history is provided by the patient. No  was used.   Fall  The accident occurred just prior to arrival. The fall occurred while walking. He fell from a height of 3 to 5 ft. He landed on [place]. There was no blood loss. Pain location: abdomen. The pain is at a severity of 4/10. He was Ambulatory at the scene. There was No entrapment after the fall. There was No drug use involved in the accident. There was No alcohol use involved in the accident. Associated symptoms include  abdominal pain. Pertinent negatives include no neck pain, no back pain, no paresthesias, no paralysis, no visual change, no fever, no numbness, no bowel incontinence, no nausea, no vomiting, no hematuria, no headaches, no hearing loss, no loss of consciousness and no tingling. He has tried nothing for the symptoms. The treatment provided no relief.     Review of patient's allergies indicates:  No Known Allergies  History reviewed. No pertinent past medical history.  Past Surgical History:   Procedure Laterality Date    ARTHROSCOPY OF KNEE  1974    CERVICAL LAMINECTOMY WITH SPINAL FUSION N/A 7/31/2023    Procedure: LAMINECTOMY, SPINE, CERVICAL, WITH FUSION;  Surgeon: Steven Clark MD;  Location: [facility] OR;  Service: Neurosurgery;  Laterality: N/A;  prone  chest rolls  neuromonitoring  DePuy  Randolph  C3-C6    COLONOSCOPY  07/16/2021    INSERTION OF PACEMAKER N/A 10/5/2023    Procedure: INSERTION, PACEMAKER;  Surgeon: Moiz Perez MD;  Location: [facility] CATH LAB;  Service: Cardiology;  Laterality: N/A;    TRANSFORAMINAL EPIDURAL INJECTION OF STEROID Left 11/30/2023    Procedure: left  L4/5 + L5/S1 TF DENNYS;  Surgeon: Julio César Mckinley MD;  Location: [facility] PAIN MGT;  Service: Pain Management;  Laterality: Left;    UMBILICAL HERNIA REPAIR       Family History   Problem Relation Age of Onset    Dementia Mother     Rheum arthritis Mother     Heart disease Father     Hypertension Father      Social History     Tobacco Use    Smoking status: Never     Passive exposure: Never    Smokeless tobacco: Never   Substance Use Topics    Alcohol use: Not Currently     Alcohol/week: 1.0 standard drink of alcohol     Types: 1 Cans of beer per week    Drug use: Not Currently     Types: Marijuana     Comment: Gummies     Review of Systems   Constitutional:  Negative for fever.   HENT:  Negative for sore throat.    Respiratory:  Negative for shortness of breath.    Cardiovascular:  Negative for chest pain.   Gastrointestinal:  Positive for  abdominal pain. Negative for bowel incontinence, nausea and vomiting.   Genitourinary:  Negative for dysuria and hematuria.   Musculoskeletal:  Negative for back pain and neck pain.   Skin:  Negative for rash.   Neurological:  Negative for tingling, loss of consciousness, weakness, numbness, headaches and paresthesias.   Hematological:  Does not bruise/bleed easily.   All other systems reviewed and are negative.      Physical Exam     Initial Vitals [03/10/24 1242]   BP Pulse Resp Temp SpO2   (!) 173/86 100 20 97.5 °F (36.4 °C) 97 %      MAP       --         Physical Exam    Nursing note and vitals reviewed.  Constitutional: Vital signs are normal. He appears well-developed and well-nourished.   HENT:   Head: Normocephalic.   Right Ear: Hearing and tympanic membrane normal.   Left Ear: Hearing and tympanic membrane normal.   Mouth/Throat: Uvula is midline, oropharynx is clear and moist and mucous membranes are normal.   Eyes: Conjunctivae and EOM are normal. Pupils are equal, round, and reactive to light.   Cardiovascular:  Normal rate, regular rhythm, normal heart sounds and normal pulses.           Pulmonary/Chest: Effort normal and breath sounds normal.   No bruising or signs of trauma to chest.  No chest wall tenderness on exam.   Abdominal: Abdomen is soft. Bowel sounds are normal. There is no abdominal tenderness.   Superficial redness and abrasion across mid abdomen. No ecchymosis present    Musculoskeletal:      Cervical back: Normal. No spinous process tenderness or muscular tenderness.      Thoracic back: Normal.      Lumbar back: Normal.      Comments: Patient has full range of motion of all extremities without pain or difficulty.  He has no spinal tenderness on exam.  No contusions or bruising to back.     Lymphadenopathy:     He has no cervical adenopathy.   Neurological: He is alert. GCS eye subscore is 4. GCS verbal subscore is 5. GCS motor subscore is 6.   Skin: Skin is warm, dry and intact.  Capillary refill takes less than 2 seconds.         ED Course   Procedures  Labs Reviewed   COMPREHENSIVE METABOLIC PANEL - Abnormal; Notable for the following components:       Result Value    Carbon Dioxide 22 (*)     Glucose Level 117 (*)     All other components within normal limits   CBC WITH DIFFERENTIAL - Abnormal; Notable for the following components:    MCH 31.3 (*)     MPV 11.7 (*)     All other components within normal limits   CBC W/ AUTO DIFFERENTIAL    Narrative:     The following orders were created for panel order CBC auto differential.  Procedure                               Abnormality         Status                     ---------                               -----------         ------                     CBC with Differential[[id number]]       Abnormal            Final result                 Please view results for these tests on the individual orders.          Imaging Results              CT Chest Abdomen Pelvis With IV Contrast (XPD) NO Oral Contrast (Final result)  Result time 03/10/24 14:30:41      Final result by Kael Davis MD (03/10/24 14:30:41)                   Impression:      No acute process is identified, and no acute fractures or subluxations are present.      Electronically signed by: Kael Davis MD  Date:    03/10/2024  Time:    14:30               Narrative:    EXAMINATION:  CT CHEST ABDOMEN PELVIS WITH IV CONTRAST (XPD)    CLINICAL HISTORY:  Polytrauma, blunt;    TECHNIQUE:  Low dose axial images, sagittal and coronal reformations were obtained from the thoracic inlet to the pubic symphysis following the IV administration of 100 mL of Omnipaque 350 Total DLP: 1499 mGy-cm. Automatic exposure control was utilized to limit the radiation dose to the patient.      COMPARISON:  None    FINDINGS:  No pneumothorax, effusion, lung nodule/mass, or suspicious interstitial or alveolar opacities are present.  The heart, aorta, and main pulmonary vessels are normal in size.  There is  no evidence for aortic dissection.  No soft tissue density foci are in the main or proximal segmental pulmonary arteries.  There is no mediastinal or hilar lymphadenopathy.  There are AICD leads and atherosclerotic changes.    There is no evidence for solid or hollow viscous organ injury in the abdomen and pelvis.  Hepatic steatosis cannot be excluded without noncontrast images.  The bowel gas pattern is nonspecific.  There is prostatomegaly, and recommend correlation with PSA values.  The appendix is unremarkable.  There are postsurgical changes from prior right inguinal herniorrhaphy.  No there is mild chronic anterior wedging of multiple thoracic vertebra.  No acute fractures or subluxations are identified.  There are prominent degenerative changes throughout the spine.                                       Medications   Tdap (BOOSTRIX) vaccine injection 0.5 mL (0.5 mLs Intramuscular Given 3/10/24 1320)   iohexoL (OMNIPAQUE 350) injection 100 mL (100 mLs Intravenous Given 3/10/24 1406)     Medical Decision Making  The patient is a 68 y.o. male with a history of hypertension who presents to the Emergency Department with a chief complaint of fall. Patient reports that he was pushing his truck up a hill with the door open when he tripped over a piece of concrete. States that he fell backwards but did not hit his head or neck. He states that open door of the truck hit his abdomen as the truck was rolling. He states that he did not hit his head or neck during this incident. States that he laid himself on the ground. He reports abrasion to his abdomen and some abdominal wall tenderness. Symptoms began just prior to arrival and have been constant since onset. His pain is currently rated as a 4/10 in severity and described as aching with no radiation. Associated symptoms include abrasion. Symptoms are aggravated with nothing and there are no alleviating factors. The patient denies head pain, neck pain, chest pain, or  back pain. He reports taking nothing prior to arrival with no relief of symptoms. No other reported symptoms at this time     Differential diagnosis include but are not limited to abdominal contusion, abrasion, abdominal trauma     Problems Addressed:  Abrasion of abdominal wall, initial encounter: acute illness or injury  Fall, initial encounter: acute illness or injury    Amount and/or Complexity of Data Reviewed  Labs: ordered.  Radiology: ordered. Decision-making details documented in ED Course.    Risk  Prescription drug management.               ED Course as of 03/10/24 1506   Sun Mar 10, 2024   1448 CT Chest Abdomen Pelvis With IV Contrast (XPD) NO Oral Contrast  Impression:     No acute process is identified, and no acute fractures or subluxations are present.   [LM]   1503 Imaging is unremarkable.  I discussed results in detail with the patient including follow up.  He is amenable to this plan and ready for discharge home.  Patient was given strict ER return precautions. [LM]      ED Course User Index  [LM] Gail Reyes NP                           Clinical Impression:  Final diagnoses:  [S30.811A] Abrasion of abdominal wall, initial encounter (Primary)  [W19.XXXA] Fall, initial encounter          ED Disposition Condition    Discharge Stable          ED Prescriptions    None       Follow-up Information       Follow up With Specialties Details Why Contact Info    Jere Suazo FNP Family Medicine Schedule an appointment as soon as possible for a visit   [address] [facility] [address] [phone]               Gail Reyes NP  03/10/24 1502

## 2024-05-10 ENCOUNTER — TELEPHONE (OUTPATIENT)
Dept: INTERNAL MEDICINE | Facility: CLINIC | Age: 69
End: 2024-05-10
Payer: MEDICARE

## 2024-05-10 NOTE — TELEPHONE ENCOUNTER
Pt requesting refill on following med but I see it was D/C on 8/2/2023. Please advise.    metoprolol succinate (TOPROL-XL) 24 hr tablet 50 mg

## 2024-05-13 NOTE — TELEPHONE ENCOUNTER
As this patient provider is no longer here and his NOV is not until June. I would advise the patient reach out to [facility] - his cardiologist- for refill on this medication if it is appropriate.     Thanks,    KATHLEEN Macedo

## 2024-05-15 DIAGNOSIS — E78.5 HYPERLIPIDEMIA, UNSPECIFIED HYPERLIPIDEMIA TYPE: ICD-10-CM

## 2024-05-20 RX ORDER — ATORVASTATIN CALCIUM 10 MG/1
10 TABLET, FILM COATED ORAL NIGHTLY
Qty: 90 TABLET | Refills: 1 | Status: SHIPPED | OUTPATIENT
Start: 2024-05-20

## 2024-06-06 ENCOUNTER — TELEPHONE (OUTPATIENT)
Dept: INTERNAL MEDICINE | Facility: CLINIC | Age: 69
End: 2024-06-06

## 2024-06-06 NOTE — TELEPHONE ENCOUNTER
Pt called and stated he and the pharmacy have been requesting a refill on the following medicine. Informed pt a refill for his other BP medicine was sent on 5/15/24 but pt stated that's not the medicine he's requesting. Please advise.    metoprolol succinate (TOPROL-XL) 50 MG 24 hr tablet (Discontinued)     LOV:11/13/23    NOV: 6/19/24

## 2024-06-06 NOTE — TELEPHONE ENCOUNTER
----- Message from Donaldo Hagan sent at 6/6/2024 10:35 AM CDT -----  .Who Called: Corey Montano    Caller is requesting assistance/information from provider's office.    Symptoms (please be specific): n/a   How long has patient had these symptoms:  n/a  List of preferred pharmacies on file (remove unneeded): [unfilled]  If different, enter pharmacy into here including location and phone number: n/a      Preferred Method of Contact: Phone Call  Patient's Preferred Phone Number on File: [phone]   Best Call Back Number, if different:  Additional Information: pt called requesting to speak with nurse to review medication list

## 2024-06-07 DIAGNOSIS — I10 HYPERTENSION, UNSPECIFIED TYPE: ICD-10-CM

## 2024-06-07 RX ORDER — METOPROLOL SUCCINATE 50 MG/1
50 TABLET, EXTENDED RELEASE ORAL DAILY
Qty: 90 TABLET | Refills: 1 | Status: SHIPPED | OUTPATIENT
Start: 2024-06-07 | End: 2024-12-04

## 2024-06-12 ENCOUNTER — TELEPHONE (OUTPATIENT)
Dept: NEUROSURGERY | Facility: CLINIC | Age: 69
End: 2024-06-12

## 2024-06-12 ENCOUNTER — OFFICE VISIT (OUTPATIENT)
Dept: NEUROSURGERY | Facility: CLINIC | Age: 69
End: 2024-06-12
Payer: MEDICARE

## 2024-06-12 ENCOUNTER — HOSPITAL ENCOUNTER (OUTPATIENT)
Dept: RADIOLOGY | Facility: HOSPITAL | Age: 69
Discharge: HOME | End: 2024-06-12
Attending: NEUROLOGICAL SURGERY
Payer: MEDICARE

## 2024-06-12 VITALS
BODY MASS INDEX: 34.45 KG/M2 | SYSTOLIC BLOOD PRESSURE: 130 MMHG | HEART RATE: 64 BPM | WEIGHT: 259.94 LBS | DIASTOLIC BLOOD PRESSURE: 79 MMHG | HEIGHT: 73 IN

## 2024-06-12 DIAGNOSIS — M48.062 SPINAL STENOSIS OF LUMBAR REGION WITH NEUROGENIC CLAUDICATION: ICD-10-CM

## 2024-06-12 DIAGNOSIS — M48.07 SPINAL STENOSIS, LUMBOSACRAL REGION: ICD-10-CM

## 2024-06-12 DIAGNOSIS — M48.062 SPINAL STENOSIS OF LUMBAR REGION WITH NEUROGENIC CLAUDICATION: Primary | ICD-10-CM

## 2024-06-12 DIAGNOSIS — M43.06 PARS DEFECT OF LUMBAR SPINE: ICD-10-CM

## 2024-06-12 DIAGNOSIS — M54.16 LUMBAR RADICULOPATHY: ICD-10-CM

## 2024-06-12 DIAGNOSIS — M48.061 FORAMINAL STENOSIS OF LUMBAR REGION: ICD-10-CM

## 2024-06-12 PROCEDURE — 3078F DIAST BP <80 MM HG: CPT | Mod: CPTII,S$GLB,, | Performed by: NEUROLOGICAL SURGERY

## 2024-06-12 PROCEDURE — 72082 X-RAY EXAM ENTIRE SPI 2/3 VW: CPT | Mod: TC,FY

## 2024-06-12 PROCEDURE — 3288F FALL RISK ASSESSMENT DOCD: CPT | Mod: CPTII,S$GLB,, | Performed by: NEUROLOGICAL SURGERY

## 2024-06-12 PROCEDURE — 99999 PR PBB SHADOW E&M-EST. PATIENT-LVL III: CPT | Mod: PBBFAC,,, | Performed by: NEUROLOGICAL SURGERY

## 2024-06-12 PROCEDURE — 72082 X-RAY EXAM ENTIRE SPI 2/3 VW: CPT | Mod: 26,,, | Performed by: INTERNAL MEDICINE

## 2024-06-12 PROCEDURE — 3008F BODY MASS INDEX DOCD: CPT | Mod: CPTII,S$GLB,, | Performed by: NEUROLOGICAL SURGERY

## 2024-06-12 PROCEDURE — 3075F SYST BP GE 130 - 139MM HG: CPT | Mod: CPTII,S$GLB,, | Performed by: NEUROLOGICAL SURGERY

## 2024-06-12 PROCEDURE — 1159F MED LIST DOCD IN RCRD: CPT | Mod: CPTII,S$GLB,, | Performed by: NEUROLOGICAL SURGERY

## 2024-06-12 PROCEDURE — 99214 OFFICE O/P EST MOD 30 MIN: CPT | Mod: S$GLB,,, | Performed by: NEUROLOGICAL SURGERY

## 2024-06-12 PROCEDURE — 1125F AMNT PAIN NOTED PAIN PRSNT: CPT | Mod: CPTII,S$GLB,, | Performed by: NEUROLOGICAL SURGERY

## 2024-06-12 PROCEDURE — 4010F ACE/ARB THERAPY RXD/TAKEN: CPT | Mod: CPTII,S$GLB,, | Performed by: NEUROLOGICAL SURGERY

## 2024-06-12 PROCEDURE — 1101F PT FALLS ASSESS-DOCD LE1/YR: CPT | Mod: CPTII,S$GLB,, | Performed by: NEUROLOGICAL SURGERY

## 2024-06-12 NOTE — PROGRESS NOTES
NEUROSURGICAL PROGRESS NOTE    DATE OF SERVICE:  06/12/2024    ATTENDING PHYSICIAN:  Kael Flannery MD    SUBJECTIVE:  02/12/24  This is a very pleasant 68 y.o. male, who is six months status post C3 through 6 laminectomy and posterior instrumented fusion for cervical spondylosis with myelopathy.  The patient also has lumbar stenosis with radiculopathy and neurogenic claudication.  He walks using a Rollator.  He has a tendency to lean forward.  Complaining of left more than right sciatica.  Has difficulty walking for long period of time.  Overall he is very satisfied with the outcome he had from his neck surgery.  He reports significant improvement in his upper extremity numbness and pain symptoms.  He recently had a transforaminal epidural steroid injection that gave him about 11 days of pain relief.     INTERIM HISTORY:    Still complains of significant feet pain, difficulty walking for long period of time. Relief of leg pain when sitting.               PAST MEDICAL HISTORY:  Active Ambulatory Problems     Diagnosis Date Noted    Hypertension 06/09/2022    Hyperlipidemia 06/09/2022    Vitamin D deficiency 06/09/2022    Obesity 06/09/2022    Arthritis 06/09/2022    Prediabetes 06/10/2022    Primary insomnia 06/10/2022    Erectile dysfunction 06/10/2022    Pain in both wrists 06/12/2023    Dorsalgia 06/12/2023    Grief 06/12/2023    Cauda equina syndrome 07/12/2023    Cervical myelopathy 08/02/2023    Bradycardia 10/03/2023    AV block 10/05/2023     Resolved Ambulatory Problems     Diagnosis Date Noted    Wellness examination 06/10/2022     No Additional Past Medical History       PAST SURGICAL HISTORY:  Past Surgical History:   Procedure Laterality Date    ARTHROSCOPY OF KNEE  1974    CERVICAL LAMINECTOMY WITH SPINAL FUSION N/A 7/31/2023    Procedure: LAMINECTOMY, SPINE, CERVICAL, WITH FUSION;  Surgeon: Steven Clark MD;  Location: [facility] OR;  Service: Neurosurgery;  Laterality: N/A;  prone  chest  rolls  neuromonitoring  DePuy  Randolph  C3-C6    COLONOSCOPY  07/16/2021    INSERTION OF PACEMAKER N/A 10/5/2023    Procedure: INSERTION, PACEMAKER;  Surgeon: Moiz Perez MD;  Location[facility];  Service: Cardiology;  Laterality: N/A;    TRANSFORAMINAL EPIDURAL INJECTION OF STEROID Left 11/30/2023    Procedure: left  L4/5 + L5/S1 TF DENNYS;  Surgeon: Julio César Mckinley MD;  Location: [facility] PAIN MGT;  Service: Pain Management;  Laterality: Left;    UMBILICAL HERNIA REPAIR         SOCIAL HISTORY:   Social History     Socioeconomic History    Marital status:      Spouse name: Barbara    Number of children: 2   Tobacco Use    Smoking status: Never     Passive exposure: Never    Smokeless tobacco: Never   Substance and Sexual Activity    Alcohol use: Not Currently     Alcohol/week: 1.0 standard drink of alcohol     Types: 1 Cans of beer per week    Drug use: Not Currently     Types: Marijuana     Comment: Gummies    Sexual activity: Not Currently     Partners: Female     Social Determinants of Health     Financial Resource Strain: Low Risk  (7/17/2023)    Overall Financial Resource Strain (CARDIA)     Difficulty of Paying Living Expenses: Not very hard   Food Insecurity: No Food Insecurity (7/17/2023)    Hunger Vital Sign     Worried About Running Out of Food in the Last Year: Never true     Ran Out of Food in the Last Year: Never true   Transportation Needs: No Transportation Needs (7/17/2023)    PRAPARE - Transportation     Lack of Transportation (Medical): No     Lack of Transportation (Non-Medical): No   Physical Activity: Insufficiently Active (12/12/2022)    Exercise Vital Sign     Days of Exercise per Week: 2 days     Minutes of Exercise per Session: 30 min   Stress: Stress Concern Present (7/17/2023)    [employer] - Occupational Stress Questionnaire     Feeling of Stress : Very much   Housing Stability: Low Risk  (7/17/2023)    Housing Stability Vital Sign     Unable to Pay  for Housing in the Last Year: No     Number of Places Lived in the Last Year: 1     Unstable Housing in the Last Year: No       FAMILY HISTORY:  Family History   Problem Relation Name Age of Onset    Dementia Mother      Rheum arthritis Mother      Heart disease Father      Hypertension Father         CURRENTS MEDICATIONS:  Current Outpatient Medications on File Prior to Visit   Medication Sig Dispense Refill    acetaminophen (TYLENOL 8 HOUR ORAL) Take by mouth.      gabapentin (NEURONTIN) 300 MG capsule Take 1 capsule (300 mg total) by mouth 3 (three) times daily. (Patient taking differently: Take 400 mg by mouth 2 (two) times daily.) 90 capsule 0    hydroCHLOROthiazide (HYDRODIURIL) 25 MG tablet Take 1 tablet (25 mg total) by mouth once daily. 30 tablet 11    lisinopriL (PRINIVIL,ZESTRIL) 40 MG tablet Take 1 tablet (40 mg total) by mouth once daily. 90 tablet 3    metoprolol succinate (TOPROL-XL) 50 MG 24 hr tablet Take 1 tablet (50 mg total) by mouth once daily. 90 tablet 1    atorvastatin (LIPITOR) 10 MG tablet TAKE 1 TABLET BY MOUTH EVERY DAY IN THE EVENING (Patient not taking: Reported on 6/12/2024) 90 tablet 1    docusate sodium (COLACE) 100 MG capsule Take 100 mg by mouth 2 (two) times daily. (Patient not taking: Reported on 6/12/2024)       No current facility-administered medications on file prior to visit.       ALLERGIES:  Review of patient's allergies indicates:  No Known Allergies    REVIEW OF SYSTEMS:  Review of Systems   Constitutional:  Negative for diaphoresis, fever and weight loss.   Respiratory:  Negative for shortness of breath.    Cardiovascular:  Negative for chest pain.   Gastrointestinal:  Negative for blood in stool.   Genitourinary:  Negative for hematuria.   Endo/Heme/Allergies:  Does not bruise/bleed easily.   All other systems reviewed and are negative.        OBJECTIVE:    PHYSICAL EXAMINATION:   Vitals:    06/12/24 1048   BP: 130/79   Pulse: 64       Physical Exam:  Vitals  reviewed.    Constitutional: He appears well-developed and well-nourished.     Eyes: Pupils are equal, round, and reactive to light. Conjunctivae and EOM are normal.     Cardiovascular: Normal distal pulses and edema.     Abdominal: Soft.     Skin: Skin displays no rash on trunk and no rash on extremities. Skin displays no lesions on trunk and no lesions on extremities.     Psych/Behavior: He is alert. He is oriented to person, place, and time. He has a normal mood and affect.     Musculoskeletal:        Neck: Range of motion is full.     Neurological:        DTRs: Tricep reflexes are 2+ on the right side and 2+ on the left side. Bicep reflexes are 2+ on the right side and 2+ on the left side. Brachioradialis reflexes are 2+ on the right side and 2+ on the left side. Patellar reflexes are 0 on the right side and 0 on the left side. Achilles reflexes are 0 on the right side and 0 on the left side.       Back Exam     Muscle Strength   Right Quadriceps:  5/5   Left Quadriceps:  5/5   Right Hamstrings:  5/5   Left Hamstrings:  5/5             SI joint:   Palpation at the right and left SI joints not painful  VALENTIN test is negative bilaterally  Gaenslen test is negative bilaterally  Thigh thrust test is negative bilaterally    Neurologic Exam     Mental Status   Oriented to person, place, and time.   Speech: speech is normal   Level of consciousness: alert    Cranial Nerves   Cranial nerves II through XII intact.     CN III, IV, VI   Pupils are equal, round, and reactive to light.  Extraocular motions are normal.     Motor Exam   Muscle bulk: normal  Overall muscle tone: normal    Strength   Right deltoid: 5/5  Left deltoid: 5/5  Right biceps: 5/5  Left biceps: 5/5  Right triceps: 5/5  Left triceps: 5/5  Right wrist flexion: 5/5  Left wrist flexion: 5/5  Right wrist extension: 5/5  Left wrist extension: 5/5  Right interossei: 5/5  Left interossei: 5/5  Right iliopsoas: 5/5  Left iliopsoas: 5/5  Right quadriceps:  5/5  Left quadriceps: 5/5  Right hamstrin/5  Left hamstrin/5  Right anterior tibial: 5/5  Left anterior tibial: 5/5  Right posterior tibial: 5/5  Left posterior tibial: 5/5  Right peroneal: 5/5  Left peroneal: 5/5  Right gastroc: 5/5  Left gastroc: 5/5    Sensory Exam   Light touch normal.   Pinprick normal.     Gait, Coordination, and Reflexes     Reflexes   Right brachioradialis: 2+  Left brachioradialis: 2+  Right biceps: 2+  Left biceps: 2+  Right triceps: 2+  Left triceps: 2+  Right patellar: 0  Left patellar: 0  Right achilles: 0  Left achilles: 0  Right plantar: normal  Left plantar: normal  Right Vidal: absent  Left Vidal: absent  Right ankle clonus: absent  Left ankle clonus: absent        DIAGNOSTIC DATA:  I personally interpreted the following imaging:   CT of the chest abdomen and pelvis of March 10/20/2024 shows ankylosis of L2-3, bilateral L5 pars defect, severe foraminal stenosis at L4-5 and L5-S1    Lumbar spine MRI 2023 shows severe spinal stenosis at L1-2, severe bilateral foraminal stenosis at L4-5 and L5-S1    ASSESSMENT:  This is a 68 y.o. male with     Problem List Items Addressed This Visit    None  Visit Diagnoses       Spinal stenosis of lumbar region with neurogenic claudication    -  Primary    Pars defect of lumbar spine        Foraminal stenosis of lumbar region        Lumbar radiculopathy        Spinal stenosis, lumbosacral region        Relevant Orders    MRI Lumbar Spine Without Contrast              PLAN:  I explained the natural history of the disease and all treatment options. I recommended a L4-5 oblique, L5-S1 anterior interbody fusion, placement of interbody spacers, DePuy Conduit cages filled with allograft BMP and DBM, L1-2 laminectomy, medial facetectomy, foraminotomy, L4-S1 posterior segmental instrumentation, using DePuy Viper Prime system.  The goals of the surgery is to improve the patient neurogenic claudication and radiculopathy symptoms.   Indirectly decompress the severe foraminal stenosis at L4-5 and L5-S1, provide long-term stability.      We have discussed the risks of surgery including death, coma, bleeding, infection, failure of surgery, CSF leak, nerve root injury, spinal cord injury, ureter injury, weakness, paralysis, peripheral neuropathy, malplaced hardware, migration of hardware, non-union, need for reoperation. Patient understands the risks and would like to proceed with surgery.    More than 50% of the time was spent on discussing conservative management treatments (medication, physical therapy exercises) and possible interventions (spinal injections and surgical procedures). Care coordination was discussed.    30 min      Kael Flannery MD  Cell:[phone]

## 2024-06-12 NOTE — TELEPHONE ENCOUNTER
Pt is scheduled to have surgery with Dr. Flannery on 7/12. Pt voiced understanding and confirmed procedure date. Primary clearance form was mailed to pt due to not being active on [facility].

## 2024-06-14 ENCOUNTER — TELEPHONE (OUTPATIENT)
Dept: NEUROSURGERY | Facility: CLINIC | Age: 69
End: 2024-06-14
Payer: MEDICARE

## 2024-06-14 DIAGNOSIS — M43.06 PARS DEFECT OF LUMBAR SPINE: Primary | ICD-10-CM

## 2024-06-14 DIAGNOSIS — Z98.1 S/P LUMBAR FUSION: ICD-10-CM

## 2024-06-18 ENCOUNTER — TELEPHONE (OUTPATIENT)
Dept: NEUROSURGERY | Facility: CLINIC | Age: 69
End: 2024-06-18
Payer: MEDICARE

## 2024-06-18 NOTE — TELEPHONE ENCOUNTER
Returned pt's call, pt stated that he would like to change his surgery date due to him trying to get his son down here. I rescheduled pt's surgery to July 19. Pt voiced understanding

## 2024-06-19 ENCOUNTER — LAB VISIT (OUTPATIENT)
Dept: LAB | Facility: HOSPITAL | Age: 69
End: 2024-06-19
Attending: NURSE PRACTITIONER
Payer: MEDICARE

## 2024-06-19 ENCOUNTER — TELEPHONE (OUTPATIENT)
Dept: NEUROSURGERY | Facility: CLINIC | Age: 69
End: 2024-06-19
Payer: MEDICARE

## 2024-06-19 ENCOUNTER — OFFICE VISIT (OUTPATIENT)
Dept: INTERNAL MEDICINE | Facility: CLINIC | Age: 69
End: 2024-06-19
Payer: MEDICARE

## 2024-06-19 VITALS
BODY MASS INDEX: 35.12 KG/M2 | TEMPERATURE: 98 F | HEART RATE: 69 BPM | WEIGHT: 265 LBS | HEIGHT: 73 IN | SYSTOLIC BLOOD PRESSURE: 136 MMHG | RESPIRATION RATE: 18 BRPM | DIASTOLIC BLOOD PRESSURE: 81 MMHG

## 2024-06-19 DIAGNOSIS — Z12.5 SCREENING FOR MALIGNANT NEOPLASM OF PROSTATE: ICD-10-CM

## 2024-06-19 DIAGNOSIS — M48.062 SPINAL STENOSIS OF LUMBAR REGION WITH NEUROGENIC CLAUDICATION: ICD-10-CM

## 2024-06-19 DIAGNOSIS — R79.1 ABNORMAL COAGULATION PROFILE: ICD-10-CM

## 2024-06-19 DIAGNOSIS — M19.09 PRIMARY OSTEOARTHRITIS, OTHER SPECIFIED SITE: ICD-10-CM

## 2024-06-19 DIAGNOSIS — I10 PRIMARY HYPERTENSION: Primary | ICD-10-CM

## 2024-06-19 DIAGNOSIS — I10 PRIMARY HYPERTENSION: ICD-10-CM

## 2024-06-19 DIAGNOSIS — R73.03 PREDIABETES: ICD-10-CM

## 2024-06-19 DIAGNOSIS — Z01.818 PREOP TESTING: ICD-10-CM

## 2024-06-19 DIAGNOSIS — E78.2 MIXED HYPERLIPIDEMIA: ICD-10-CM

## 2024-06-19 LAB
ALBUMIN SERPL-MCNC: 4.1 G/DL (ref 3.4–4.8)
ALBUMIN/GLOB SERPL: 1.2 RATIO (ref 1.1–2)
ALP SERPL-CCNC: 90 UNIT/L (ref 40–150)
ALT SERPL-CCNC: 36 UNIT/L (ref 0–55)
ANION GAP SERPL CALC-SCNC: 6 MEQ/L
APTT PPP: 32.7 SECONDS (ref 23.2–33.7)
AST SERPL-CCNC: 26 UNIT/L (ref 5–34)
BASOPHILS # BLD AUTO: 0.08 X10(3)/MCL
BASOPHILS NFR BLD AUTO: 1 %
BILIRUB SERPL-MCNC: 0.6 MG/DL
BUN SERPL-MCNC: 18 MG/DL (ref 8.4–25.7)
CALCIUM SERPL-MCNC: 10.1 MG/DL (ref 8.8–10)
CHLORIDE SERPL-SCNC: 103 MMOL/L (ref 98–107)
CHOLEST SERPL-MCNC: 195 MG/DL
CHOLEST/HDLC SERPL: 5 {RATIO} (ref 0–5)
CO2 SERPL-SCNC: 26 MMOL/L (ref 23–31)
CREAT SERPL-MCNC: 0.96 MG/DL (ref 0.73–1.18)
CREAT/UREA NIT SERPL: 19
EOSINOPHIL # BLD AUTO: 0.18 X10(3)/MCL (ref 0–0.9)
EOSINOPHIL NFR BLD AUTO: 2.3 %
ERYTHROCYTE [DISTWIDTH] IN BLOOD BY AUTOMATED COUNT: 13.2 % (ref 11.5–17)
EST. AVERAGE GLUCOSE BLD GHB EST-MCNC: 122.6 MG/DL
GFR SERPLBLD CREATININE-BSD FMLA CKD-EPI: >60 ML/MIN/1.73/M2
GLOBULIN SER-MCNC: 3.4 GM/DL (ref 2.4–3.5)
GLUCOSE SERPL-MCNC: 109 MG/DL (ref 82–115)
HBA1C MFR BLD: 5.9 %
HCT VFR BLD AUTO: 49.2 % (ref 42–52)
HDLC SERPL-MCNC: 36 MG/DL (ref 35–60)
HGB BLD-MCNC: 16.4 G/DL (ref 14–18)
IMM GRANULOCYTES # BLD AUTO: 0.02 X10(3)/MCL (ref 0–0.04)
IMM GRANULOCYTES NFR BLD AUTO: 0.3 %
INR PPP: 1
LDLC SERPL CALC-MCNC: 127 MG/DL (ref 50–140)
LYMPHOCYTES # BLD AUTO: 2 X10(3)/MCL (ref 0.6–4.6)
LYMPHOCYTES NFR BLD AUTO: 25.3 %
MCH RBC QN AUTO: 30.8 PG (ref 27–31)
MCHC RBC AUTO-ENTMCNC: 33.3 G/DL (ref 33–36)
MCV RBC AUTO: 92.5 FL (ref 80–94)
MONOCYTES # BLD AUTO: 0.73 X10(3)/MCL (ref 0.1–1.3)
MONOCYTES NFR BLD AUTO: 9.2 %
NEUTROPHILS # BLD AUTO: 4.9 X10(3)/MCL (ref 2.1–9.2)
NEUTROPHILS NFR BLD AUTO: 61.9 %
NRBC BLD AUTO-RTO: 0 %
PLATELET # BLD AUTO: 233 X10(3)/MCL (ref 130–400)
PMV BLD AUTO: 11.9 FL (ref 7.4–10.4)
POTASSIUM SERPL-SCNC: 4.4 MMOL/L (ref 3.5–5.1)
PROT SERPL-MCNC: 7.5 GM/DL (ref 5.8–7.6)
PROTHROMBIN TIME: 12.8 SECONDS (ref 11.4–14)
PSA SERPL-MCNC: 2.12 NG/ML
RBC # BLD AUTO: 5.32 X10(6)/MCL (ref 4.7–6.1)
SODIUM SERPL-SCNC: 135 MMOL/L (ref 136–145)
T4 FREE SERPL-MCNC: 0.91 NG/DL (ref 0.7–1.48)
TRIGL SERPL-MCNC: 160 MG/DL (ref 34–140)
TSH SERPL-ACNC: 2.25 UIU/ML (ref 0.35–4.94)
VLDLC SERPL CALC-MCNC: 32 MG/DL
WBC # BLD AUTO: 7.91 X10(3)/MCL (ref 4.5–11.5)

## 2024-06-19 PROCEDURE — 3075F SYST BP GE 130 - 139MM HG: CPT | Mod: CPTII,,, | Performed by: NURSE PRACTITIONER

## 2024-06-19 PROCEDURE — 85610 PROTHROMBIN TIME: CPT

## 2024-06-19 PROCEDURE — 84153 ASSAY OF PSA TOTAL: CPT

## 2024-06-19 PROCEDURE — 1160F RVW MEDS BY RX/DR IN RCRD: CPT | Mod: CPTII,,, | Performed by: NURSE PRACTITIONER

## 2024-06-19 PROCEDURE — 83036 HEMOGLOBIN GLYCOSYLATED A1C: CPT

## 2024-06-19 PROCEDURE — 84439 ASSAY OF FREE THYROXINE: CPT

## 2024-06-19 PROCEDURE — 80053 COMPREHEN METABOLIC PANEL: CPT

## 2024-06-19 PROCEDURE — 1159F MED LIST DOCD IN RCRD: CPT | Mod: CPTII,,, | Performed by: NURSE PRACTITIONER

## 2024-06-19 PROCEDURE — 80061 LIPID PANEL: CPT

## 2024-06-19 PROCEDURE — 4010F ACE/ARB THERAPY RXD/TAKEN: CPT | Mod: CPTII,,, | Performed by: NURSE PRACTITIONER

## 2024-06-19 PROCEDURE — 99214 OFFICE O/P EST MOD 30 MIN: CPT | Mod: S$PBB,,, | Performed by: NURSE PRACTITIONER

## 2024-06-19 PROCEDURE — 99214 OFFICE O/P EST MOD 30 MIN: CPT | Mod: PBBFAC | Performed by: NURSE PRACTITIONER

## 2024-06-19 PROCEDURE — 3008F BODY MASS INDEX DOCD: CPT | Mod: CPTII,,, | Performed by: NURSE PRACTITIONER

## 2024-06-19 PROCEDURE — 3079F DIAST BP 80-89 MM HG: CPT | Mod: CPTII,,, | Performed by: NURSE PRACTITIONER

## 2024-06-19 PROCEDURE — 36415 COLL VENOUS BLD VENIPUNCTURE: CPT

## 2024-06-19 PROCEDURE — 85025 COMPLETE CBC W/AUTO DIFF WBC: CPT

## 2024-06-19 PROCEDURE — 84443 ASSAY THYROID STIM HORMONE: CPT

## 2024-06-19 PROCEDURE — 85730 THROMBOPLASTIN TIME PARTIAL: CPT

## 2024-06-19 PROCEDURE — 1101F PT FALLS ASSESS-DOCD LE1/YR: CPT | Mod: CPTII,,, | Performed by: NURSE PRACTITIONER

## 2024-06-19 PROCEDURE — 3288F FALL RISK ASSESSMENT DOCD: CPT | Mod: CPTII,,, | Performed by: NURSE PRACTITIONER

## 2024-06-19 NOTE — ASSESSMENT & PLAN NOTE
Lab Results   Component Value Date    HGBA1C 5.5 07/28/2023    HGBA1C 5.8 06/06/2023    .00 06/06/2023    CREATININE 0.86 03/10/2024    CREATININE 0.8 08/02/2023       Discussed importance of diabetes prevention through lifestyle and dietary modifications.    Advised of ADA, low carb, low-fat, low-cholesterol diet, and importance of portion control.  Advised of increasing aerobic activity as tolerated and healthy weight maintenance.  Avoid soda, simple sweets, and limit rice/pasta/breads/starches (no more than 45-50 grams per meal).  Maintain healthy weight with goal BMI <30.  Exercise 5 times per week for 30 minutes per day.  Discussed importance of lifestyle and dietary modifications to prevent diabetes.

## 2024-06-19 NOTE — TELEPHONE ENCOUNTER
Returned pt's call, pt stated that he had a pacemaker and needs to reschedule his MRI. I rescheduled it to the right place. Pt voiced understanding

## 2024-06-19 NOTE — ASSESSMENT & PLAN NOTE
Goal BP < 140/90, best goal is BP <130/80 consistently   Reduce the amount of salt in your diet, follow a 2 gm sodium, DASH diet daily            Lose weight if you are overweight or have obesity  Avoid drinking too much alcohol  Stop smoking  Exercise at least 30 minutes per day most days of the week     Problem: NORMAL   Goal: Experiences normal transition  Description: INTERVENTIONS:  - Monitor vital signs  - Maintain thermoregulation  - Assess for hypoglycemia risk factors or signs and symptoms  - Assess for sepsis risk factors or signs and symptoms  - Assess for jaundice risk and/or signs and symptoms  Outcome: Progressing  Goal: Total weight loss less than 10% of birth weight  Description: INTERVENTIONS:  - Assess feeding patterns  - Weigh daily  Outcome: Progressing     Problem: PAIN -   Goal: Displays adequate comfort level or baseline comfort level  Description: INTERVENTIONS:  - Perform pain scoring using age-appropriate tool with hands-on care as needed. Notify physician/AP of high pain scores not responsive to comfort measures  - Administer analgesics based on type and severity of pain and evaluate response  - Sucrose analgesia per protocol for brief minor painful procedures  - Teach parents interventions for comforting infant  Outcome: Progressing     Problem: THERMOREGULATION - PEDIATRICS  Goal: Maintains normal body temperature  Description: Interventions:  - Monitor temperature (axillary for Newborns) as ordered  - Monitor for signs of hypothermia or hyperthermia  - Provide thermal support measures  - Wean to open crib when appropriate  Outcome: Progressing     Problem: INFECTION -   Goal: No evidence of infection  Description: INTERVENTIONS:  - Instruct family/visitors to use good hand hygiene technique  - Identify and instruct in appropriate isolation precautions for identified infection/condition  - Change incubator every 2 weeks or as needed. - Monitor for symptoms of infection  - Monitor surgical sites and insertion sites for all indwelling lines, tubes, and drains for drainage, redness, or edema.  - Monitor endotracheal and nasal secretions for changes in amount and color  - Monitor culture and CBC results  - Administer antibiotics as ordered.   Monitor drug levels  Outcome: Progressing     Problem: RISK FOR INFECTION (RISK FACTORS FOR MATERNAL CHORIOAMNIOITIS - )  Goal: No evidence of infection  Description: INTERVENTIONS:  - Instruct family/visitors to use good hand hygiene technique  - Monitor for symptoms of infection  - Monitor culture and CBC results  - Administer antibiotics as ordered. Monitor drug levels  Outcome: Progressing     Problem: SAFETY -   Goal: Patient will remain free from falls  Description: INTERVENTIONS:  - Instruct family/caregiver on patient safety  - Keep incubator doors and portholes closed when unattended  - Keep radiant warmer side rails and crib rails up when unattended  - Based on caregiver fall risk screen, instruct family/caregiver to ask for assistance with transferring infant if caregiver noted to have fall risk factors  Outcome: Progressing     Problem: Knowledge Deficit  Goal: Patient/family/caregiver demonstrates understanding of disease process, treatment plan, medications, and discharge instructions  Description: Complete learning assessment and assess knowledge base.   Interventions:  - Provide teaching at level of understanding  - Provide teaching via preferred learning methods  Outcome: Progressing  Goal: Infant caregiver verbalizes understanding of benefits of skin-to-skin with healthy   Description: Prior to delivery, educate patient regarding skin-to-skin practice and its benefits  Initiate immediate and uninterrupted skin-to-skin contact after birth until breastfeeding is initiated or a minimum of one hour  Encourage continued skin-to-skin contact throughout the post partum stay    Outcome: Progressing  Goal: Infant caregiver verbalizes understanding of benefits and management of breastfeeding their healthy   Description: Help initiate breastfeeding within one hour of birth  Educate/assist with breastfeeding positioning and latch  Educate on safe positioning and to monitor their  for safety  Educate on how to maintain lactation even if they are  from their   Educate/initiate pumping for a mom with a baby in the NICU within 6 hours after birth  Give infants no food or drink other than breast milk unless medically indicated  Educate on feeding cues and encourage breastfeeding on demand    Outcome: Progressing  Goal: Infant caregiver verbalizes understanding of benefits to rooming-in with their healthy   Description: Promote rooming in 23 out of 24 hours per day  Educate on benefits to rooming-in  Provide  care in room with parents as long as infant and mother condition allow    Outcome: Progressing  Goal: Provide formula feeding instructions and preparation information to caregivers who do not wish to breastfeed their   Description: Provide one on one information on frequency, amount, and burping for formula feeding caregivers throughout their stay and at discharge. Provide written information/video on formula preparation. Outcome: Progressing  Goal: Infant caregiver verbalizes understanding of support and resources for follow up after discharge  Description: Provide individual discharge education on when to call the doctor. Provide resources and contact information for post-discharge support.     Outcome: Progressing     Problem: DISCHARGE PLANNING  Goal: Discharge to home or other facility with appropriate resources  Description: INTERVENTIONS:  - Identify barriers to discharge w/patient and caregiver  - Arrange for needed discharge resources and transportation as appropriate  - Identify discharge learning needs (meds, wound care, etc.)  - Arrange for interpretive services to assist at discharge as needed  - Refer to Case Management Department for coordinating discharge planning if the patient needs post-hospital services based on physician/advanced practitioner order or complex needs related to functional status, cognitive ability, or social support system  Outcome: Progressing     Problem: Adequate NUTRIENT INTAKE -   Goal: Nutrient/Hydration intake appropriate for improving, restoring or maintaining nutritional needs  Description: INTERVENTIONS:  - Assess growth and nutritional status of patients and recommend course of action  - Monitor nutrient intake, labs, and treatment plans  - Recommend appropriate diets and vitamin/mineral supplements  - Monitor and recommend adjustments to tube feedings and TPN/PPN based on assessed needs  - Provide specific nutrition education as appropriate  Outcome: Progressing  Goal: Breast feeding baby will demonstrate adequate intake  Description: Interventions:  - Monitor/record daily weights and I&O  - Monitor milk transfer  - Increase maternal fluid intake  - Increase breastfeeding frequency and duration  - Teach mother to massage breast before feeding/during infant pauses during feeding  - Pump breast after feeding  - Review breastfeeding discharge plan with mother.  Refer to breast feeding support groups  - Initiate discussion/inform physician of weight loss and interventions taken  - Help mother initiate breast feeding within an hour of birth  - Encourage skin to skin time with  within 5 minutes of birth  - Give  no food or drink other than breast milk  - Encourage rooming in  - Encourage breast feeding on demand  - Initiate SLP consult as needed  Outcome: Progressing

## 2024-06-19 NOTE — ASSESSMENT & PLAN NOTE
He does have labs for preop testing, we will order those today reviewed order form from Neurosurgery.  Needs CBC, PT, PTT no known history of coagulopathy problems.

## 2024-06-19 NOTE — ASSESSMENT & PLAN NOTE
He is following with Neurosurgery.  Scheduled to have lumbar surgery in early July 20, 2024, with Dr. Wilder and [place].  Preop labs ordered.  He will follow up with his cardiologist for cardiology clearance.  He denies any exertional chest pain or shortness a breath, feels well.  Blood pressure well-controlled.  Reviewed recent labs and updating labs today.  He is using a walker and fall precautions are advised.  ER precautions.

## 2024-06-19 NOTE — PROGRESS NOTES
Internal Medicine Clinic  Mati Msua DNP     Patient ID: [id number]     Chief Complaint: Follow-up (Will needs pre-op testing ordered for back surgery clearance. )      HPI:     Corey Montano is a 68 y.o. male here today for follow up with PCP.     PmHx of HTN, HLD, vitamin D deficiency, arthritis, ED, and obesity, severe lumbar stenosis, bradycardia with pacemaker insertion follows Cardiology at [facility].  Health Maintenance         Date Due Completion Date    PROSTATE-SPECIFIC ANTIGEN 06/06/2024 6/6/2023    Hemoglobin A1c (Prediabetes) 07/28/2024 7/28/2023    Lipid Panel 06/06/2028 6/6/2023    Colorectal Cancer Screening 07/16/2031 7/16/2021    TETANUS VACCINE 03/10/2034 3/10/2024            History reviewed. No pertinent past medical history.     Past Surgical History:   Procedure Laterality Date    ARTHROSCOPY OF KNEE  1974    CERVICAL LAMINECTOMY WITH SPINAL FUSION N/A 7/31/2023    Procedure: LAMINECTOMY, SPINE, CERVICAL, WITH FUSION;  Surgeon: Steven Clark MD;  Location: [facility] OR;  Service: Neurosurgery;  Laterality: N/A;  prone  chest rolls  neuromonitoring  DePuy  Randolph  C3-C6    COLONOSCOPY  07/16/2021    INSERTION OF PACEMAKER N/A 10/5/2023    Procedure: INSERTION, PACEMAKER;  Surgeon: Moiz Perez MD;  Location: [facility] CATH LAB;  Service: Cardiology;  Laterality: N/A;    TRANSFORAMINAL EPIDURAL INJECTION OF STEROID Left 11/30/2023    Procedure: left  L4/5 + L5/S1 TF DENNYS;  Surgeon: Julio César Mckinley MD;  Location: [facility] PAIN T;  Service: Pain Management;  Laterality: Left;    UMBILICAL HERNIA REPAIR          Social History     Tobacco Use    Smoking status: Never     Passive exposure: Never    Smokeless tobacco: Never   Substance and Sexual Activity    Alcohol use: Not Currently     Alcohol/week: 1.0 standard drink of alcohol     Types: 1 Cans of beer per week    Drug use: Not Currently     Types: Marijuana     Comment: Gummies    Sexual activity: Not Currently     Partners: Female         Current Outpatient Medications   Medication Instructions    acetaminophen (TYLENOL 8 HOUR ORAL) Oral    atorvastatin (LIPITOR) 10 mg, Oral, Nightly    docusate sodium (COLACE) 100 mg, Oral, 2 times daily    gabapentin (NEURONTIN) 300 mg, Oral, 3 times daily    hydroCHLOROthiazide (HYDRODIURIL) 25 mg, Oral, Daily    lisinopriL (PRINIVIL,ZESTRIL) 40 mg, Oral, Daily    metoprolol succinate (TOPROL-XL) 50 mg, Oral, Daily       Review of patient's allergies indicates:  No Known Allergies     Patient Care Team:  Mati Musa FNP as PCP - General (Family Medicine)  Alonso Rojo MD as Consulting Physician (Colon and Rectal Surgery)     Subjective:     Review of Systems   Constitutional:  Negative for appetite change, chills, diaphoresis and fever.   HENT:  Negative for ear pain, sinus pain and sore throat.    Eyes:  Negative for pain and visual disturbance.   Respiratory:  Negative for cough, shortness of breath and wheezing.    Cardiovascular:  Negative for chest pain, palpitations and leg swelling.   Gastrointestinal:  Negative for abdominal pain, blood in stool, diarrhea, nausea and vomiting.   Endocrine: Negative for cold intolerance.   Genitourinary:  Negative for difficulty urinating, dysuria, frequency and hematuria.   Musculoskeletal:  Positive for arthralgias and back pain. Negative for joint swelling and myalgias.   Skin:  Negative for color change and rash.   Allergic/Immunologic: Negative.    Neurological: Negative.  Negative for dizziness, syncope, light-headedness and numbness.   Hematological: Negative.    Psychiatric/Behavioral: Negative.  Negative for dysphoric mood and suicidal ideas. The patient is not nervous/anxious.    All other systems reviewed and are negative.      12 point review of systems conducted, negative except as stated in the history of present illness. See HPI for details.    Objective:     Visit Vitals  /81 (BP Location: Left arm, Patient Position: Sitting, BP  "Method: Large (Automatic))   Pulse 69   Temp 98.2 °F (36.8 °C) (Oral)   Resp 18   Ht 6' 0.99" (1.854 m)   Wt 120.2 kg (265 lb)   BMI 34.97 kg/m²       Physical Exam  Vitals and nursing note reviewed.   Constitutional:       General: He is not in acute distress.     Appearance: He is not ill-appearing.   HENT:      Head: Normocephalic and atraumatic.      Mouth/Throat:      Mouth: Mucous membranes are moist.      Pharynx: Oropharynx is clear.   Eyes:      General: No scleral icterus.     Extraocular Movements: Extraocular movements intact.      Conjunctiva/sclera: Conjunctivae normal.      Pupils: Pupils are equal, round, and reactive to light.   Neck:      Vascular: No carotid bruit.   Cardiovascular:      Rate and Rhythm: Normal rate and regular rhythm.      Heart sounds: No murmur heard.     No friction rub. No gallop.   Pulmonary:      Effort: Pulmonary effort is normal. No respiratory distress.      Breath sounds: Normal breath sounds. No wheezing, rhonchi or rales.   Abdominal:      General: Abdomen is flat. Bowel sounds are normal. There is no distension.      Palpations: Abdomen is soft. There is no mass.      Tenderness: There is no abdominal tenderness.   Musculoskeletal:      Cervical back: Normal range of motion and neck supple.      Lumbar back: Tenderness present. Decreased range of motion.      Comments: Ambulating with a walker, bilateral lower extremity weakness noted   Skin:     General: Skin is warm and dry.   Neurological:      General: No focal deficit present.      Mental Status: He is alert.   Psychiatric:         Mood and Affect: Mood normal.         Labs Reviewed:     Chemistry:  Lab Results   Component Value Date     03/10/2024    K 4.0 03/10/2024    BUN 20.9 03/10/2024    CREATININE 0.86 03/10/2024    EGFRNORACEVR >60 03/10/2024    GLUCOSE 117 (H) 03/10/2024    CALCIUM 9.5 03/10/2024    ALKPHOS 91 03/10/2024    LABPROT 6.6 03/10/2024    ALBUMIN 3.9 03/10/2024    AST 27 03/10/2024    " ALT 34 03/10/2024    MG 2.40 10/05/2023    TSH 1.789 10/03/2023    PSA 2.77 06/06/2023        Lab Results   Component Value Date    HGBA1C 5.5 07/28/2023        Hematology:  Lab Results   Component Value Date    WBC 7.58 03/10/2024    HGB 15.2 03/10/2024    HCT 44.4 03/10/2024     03/10/2024       Lipid Panel:  Lab Results   Component Value Date    CHOL 166 06/06/2023    HDL 31 (L) 06/06/2023    .00 06/06/2023    TRIG 135 06/06/2023    TOTALCHOLEST 5 06/06/2023        Urine:  Lab Results   Component Value Date    APPEARANCEUA Clear 06/06/2023    SGUA 1.018 06/06/2023    PROTEINUA Negative 06/06/2023    KETONESUA Negative 06/06/2023    LEUKOCYTESUR Negative 06/06/2023    RBCUA 0-5 06/06/2023    WBCUA 0-5 06/06/2023    BACTERIA None Seen 06/06/2023    SQEPUA None Seen 06/06/2023    HYALINECASTS None Seen 06/06/2023        Assessment:       ICD-10-CM ICD-9-CM   1. Primary hypertension  I10 401.9   2. Mixed hyperlipidemia  E78.2 272.2   3. Prediabetes  R73.03 790.29   4. Screening for malignant neoplasm of prostate  Z12.5 V76.44   5. Preop testing  Z01.818 V72.84   6. Primary osteoarthritis, other specified site  M19.09 715.98   7. Abnormal coagulation profile  R79.1 790.92   8. Spinal stenosis of lumbar region with neurogenic claudication  M48.062 724.03        Plan:     1. Primary hypertension  Overview:  Managed with HCTZ 25mg po daily, Lisinopril 40 mg      Assessment & Plan:  Goal BP < 140/90, best goal is BP <130/80 consistently   Reduce the amount of salt in your diet, follow a 2 gm sodium, DASH diet daily            Lose weight if you are overweight or have obesity  Avoid drinking too much alcohol  Stop smoking  Exercise at least 30 minutes per day most days of the week      Orders:  -     CBC Auto Differential; Future; Expected date: 06/19/2024  -     Comprehensive Metabolic Panel; Future; Expected date: 06/19/2024  -     TSH; Future; Expected date: 06/19/2024  -     Urinalysis; Future; Expected  date: 06/19/2024  -     T4, Free; Future; Expected date: 06/19/2024  -     Microalbumin/Creatinine Ratio, Urine; Future; Expected date: 06/19/2024  -     CBC Auto Differential; Future; Expected date: 12/19/2024  -     Comprehensive Metabolic Panel; Future; Expected date: 12/19/2024  -     TSH; Future; Expected date: 12/19/2024  -     Urinalysis; Future; Expected date: 12/19/2024  -     T4, Free; Future; Expected date: 12/19/2024  -     Microalbumin/Creatinine Ratio, Urine; Future; Expected date: 12/19/2024    2. Mixed hyperlipidemia  Overview:  Managed with Atorvastatin 10 mg po daily    Assessment & Plan:  Counseled for low-sodium, low carb, low-cholesterol, good fat, Dash diet.  Recommend increasing fiber in the diet to lower LDL, increasing fish oil and fish such as salmon may help increase HDL good cholesterol.  Increasing aerobic activity as tolerated may also help lower LDL.  Healthy weight maintenance is advised, portion control, calorie counting, and discussed difference between complex carbs and simple carbs.    Continue current statin therapy.      Orders:  -     Lipid Panel; Future; Expected date: 06/19/2024    3. Prediabetes  Assessment & Plan:  Lab Results   Component Value Date    HGBA1C 5.5 07/28/2023    HGBA1C 5.8 06/06/2023    .00 06/06/2023    CREATININE 0.86 03/10/2024    CREATININE 0.8 08/02/2023       Discussed importance of diabetes prevention through lifestyle and dietary modifications.    Advised of ADA, low carb, low-fat, low-cholesterol diet, and importance of portion control.  Advised of increasing aerobic activity as tolerated and healthy weight maintenance.  Avoid soda, simple sweets, and limit rice/pasta/breads/starches (no more than 45-50 grams per meal).  Maintain healthy weight with goal BMI <30.  Exercise 5 times per week for 30 minutes per day.  Discussed importance of lifestyle and dietary modifications to prevent diabetes.          Orders:  -     Hemoglobin A1C; Future;  Expected date: 06/19/2024  -     Hemoglobin A1C; Future; Expected date: 12/19/2024    4. Screening for malignant neoplasm of prostate  Assessment & Plan:  He does have labs for preop testing, we will order those today reviewed order form from Neurosurgery.  Needs CBC, PT, PTT no known history of coagulopathy problems.    Orders:  -     PSA, Screening; Future; Expected date: 06/19/2024    5. Preop testing  Assessment & Plan:  He does have labs for preop testing, we will order those today reviewed order form from Neurosurgery.  Needs CBC, PT, PTT no known history of coagulopathy problems.      6. Primary osteoarthritis, other specified site  -     Protime-INR; Future; Expected date: 06/19/2024  -     APTT; Future; Expected date: 06/19/2024    7. Abnormal coagulation profile  -     APTT; Future; Expected date: 06/19/2024    8. Spinal stenosis of lumbar region with neurogenic claudication  Assessment & Plan:  He is following with Neurosurgery.  Scheduled to have lumbar surgery in early July 20, 2024, with Dr. Wilder and Jeff Perry.  Preop labs ordered.  He will follow up with his cardiologist for cardiology clearance.  He denies any exertional chest pain or shortness a breath, feels well.  Blood pressure well-controlled.  Reviewed recent labs and updating labs today.  He is using a walker and fall precautions are advised.  ER precautions.           Follow up in about 6 months (around 12/19/2024) for with lab work prior to visit, follow up. In addition to their scheduled follow up, the patient has also been instructed to follow up on as needed basis.     Future Appointments   Date Time Provider Department Center   6/26/2024  8:30 AM [facility] MRI1 500 LB LIMIT [facility] MRI [place] [facility]   7/31/2024  9:00 AM [facility] ODC XR-A LIMIT 350 LBS [facility] XRAY OP [place] [facility]   7/31/2024 10:00 AM Syeda Ceja PA-C [facility] NEUROSU [place] [facility]   9/3/2024 10:00 AM [facility] ODC XR-A LIMIT 350 LBS [facility] XRAY OP [place] [facility]   9/3/2024 11:00 AM  Syeda Ceja, PA-C [facility] NEUROSU [place] [facility]   10/23/2024 10:30 AM [facility] ODC XR-A LIMIT 350 LBS [facility] XRAY OP [place] [facility]   10/23/2024 11:30 AM Kael Flannery MD [facility] NEUROSU [place] [facility]   12/19/2024 10:40 AM Mati Musa FNP [facility] INTMED [facility]        KATHLEEN Jaramillo

## 2024-06-20 ENCOUNTER — TELEPHONE (OUTPATIENT)
Dept: PREADMISSION TESTING | Facility: HOSPITAL | Age: 69
End: 2024-06-20
Payer: MEDICARE

## 2024-06-20 ENCOUNTER — TELEPHONE (OUTPATIENT)
Dept: INTERNAL MEDICINE | Facility: CLINIC | Age: 69
End: 2024-06-20
Payer: MEDICARE

## 2024-06-20 DIAGNOSIS — G95.9 CERVICAL MYELOPATHY: ICD-10-CM

## 2024-06-20 DIAGNOSIS — Z01.818 PRE-OP TESTING: Primary | ICD-10-CM

## 2024-06-20 DIAGNOSIS — I10 HYPERTENSION, UNSPECIFIED TYPE: ICD-10-CM

## 2024-06-20 NOTE — TELEPHONE ENCOUNTER
Received surgery clearance form from [facility] [place]/Dr. Clark [facility]

## 2024-06-20 NOTE — TELEPHONE ENCOUNTER
Spoke to the patient and he would like his surgery on 7/19/24. He needs to have his son with him and this is the day he is available.   
,

## 2024-06-21 ENCOUNTER — OFFICE VISIT (OUTPATIENT)
Dept: INTERNAL MEDICINE | Facility: CLINIC | Age: 69
End: 2024-06-21
Payer: MEDICARE

## 2024-06-21 ENCOUNTER — HOSPITAL ENCOUNTER (OUTPATIENT)
Dept: RADIOLOGY | Facility: HOSPITAL | Age: 69
Discharge: HOME | End: 2024-06-21
Payer: MEDICARE

## 2024-06-21 VITALS
BODY MASS INDEX: 35.52 KG/M2 | HEART RATE: 72 BPM | SYSTOLIC BLOOD PRESSURE: 136 MMHG | OXYGEN SATURATION: 100 % | WEIGHT: 268 LBS | TEMPERATURE: 99 F | HEIGHT: 73 IN | DIASTOLIC BLOOD PRESSURE: 78 MMHG | RESPIRATION RATE: 14 BRPM

## 2024-06-21 DIAGNOSIS — Z01.818 PREOP EXAMINATION: ICD-10-CM

## 2024-06-21 DIAGNOSIS — G95.20 SPINAL CORD COMPRESSION: ICD-10-CM

## 2024-06-21 DIAGNOSIS — I10 PRIMARY HYPERTENSION: ICD-10-CM

## 2024-06-21 DIAGNOSIS — R00.1 BRADYCARDIA: Primary | ICD-10-CM

## 2024-06-21 LAB
OHS QRS DURATION: 164 MS
OHS QTC CALCULATION: 456 MS

## 2024-06-21 PROCEDURE — 99214 OFFICE O/P EST MOD 30 MIN: CPT | Mod: PBBFAC

## 2024-06-21 PROCEDURE — 93005 ELECTROCARDIOGRAM TRACING: CPT

## 2024-06-21 PROCEDURE — 71046 X-RAY EXAM CHEST 2 VIEWS: CPT | Mod: TC

## 2024-06-21 NOTE — PROGRESS NOTES
Attending physician addendum-  Patient discussed in clinic with the resident.   Care provided is appropriate.   I personally saw the patient in clinic, discussed his previous cervical surgery and pending lumbar surgery. He was recently seen by his cardiologist with no changes, no new medications.  Agree with risk assessment

## 2024-06-21 NOTE — PROGRESS NOTES
INTERNAL MEDICINE RESIDENT CLINIC  PREOPERATIVE CLINIC NOTE    Patient Name: Corey Montano  YOB: 1955    PRESENTING HISTORY       History of Present Illness:  Mr. Corey Montano is a 68 y.o. male with hx of CHB with Junctional Escape Rhythm s/p Dual Chamber PPM insertion, HTN, HLD presents to preoperative evaluation to undergo lumbar fusion.    Patient presents with labs (CBC, CMP, PT/INR, PSA) all wnl. Ordering CXR and EKG today: RBBB, with first degree AV block. No complaints except for back pain. He does not smoke. Does yardwork. No SOB, CP, Orthopnea, Leg Swelling. Not on any antiplatelet or anticoagulation.    CURRENT MEDICATIONS      Current Outpatient Medications on File Prior to Visit   Medication Sig    acetaminophen (TYLENOL 8 HOUR ORAL) Take by mouth.    atorvastatin (LIPITOR) 10 MG tablet TAKE 1 TABLET BY MOUTH EVERY DAY IN THE EVENING    docusate sodium (COLACE) 100 MG capsule Take 100 mg by mouth 2 (two) times daily.    hydroCHLOROthiazide (HYDRODIURIL) 25 MG tablet Take 1 tablet (25 mg total) by mouth once daily.    lisinopriL (PRINIVIL,ZESTRIL) 40 MG tablet Take 1 tablet (40 mg total) by mouth once daily.    metoprolol succinate (TOPROL-XL) 50 MG 24 hr tablet Take 1 tablet (50 mg total) by mouth once daily.    gabapentin (NEURONTIN) 300 MG capsule Take 1 capsule (300 mg total) by mouth 3 (three) times daily. (Patient taking differently: Take 400 mg by mouth 2 (two) times daily.)     No current facility-administered medications on file prior to visit.         Review of Systems   Respiratory:  Negative for cough and shortness of breath.    Cardiovascular:  Negative for chest pain and leg swelling.   Musculoskeletal:  Positive for back pain.       PAST HISTORY:     History reviewed. No pertinent past medical history.    Past Surgical History:   Procedure Laterality Date    ARTHROSCOPY OF KNEE  1974    CERVICAL LAMINECTOMY WITH SPINAL FUSION N/A 7/31/2023    Procedure: LAMINECTOMY,  SPINE, CERVICAL, WITH FUSION;  Surgeon: Steven Clark MD;  Location: [facility] OR;  Service: Neurosurgery;  Laterality: N/A;  prone  chest rolls  neuromonitoring  DePuy  Randolph  C3-C6    COLONOSCOPY  07/16/2021    INSERTION OF PACEMAKER N/A 10/5/2023    Procedure: INSERTION, PACEMAKER;  Surgeon: Moiz Perez MD;  Location: [facility] CATH LAB;  Service: Cardiology;  Laterality: N/A;    TRANSFORAMINAL EPIDURAL INJECTION OF STEROID Left 11/30/2023    Procedure: left  L4/5 + L5/S1 TF DENNYS;  Surgeon: Julio César Mckinley MD;  Location: [facility] PAIN MGT;  Service: Pain Management;  Laterality: Left;    UMBILICAL HERNIA REPAIR         Family History   Problem Relation Name Age of Onset    Dementia Mother      Rheum arthritis Mother      Heart disease Father      Hypertension Father         Social History     Socioeconomic History    Marital status:      Spouse name: Barbara    Number of children: 2   Tobacco Use    Smoking status: Never     Passive exposure: Never    Smokeless tobacco: Never   Substance and Sexual Activity    Alcohol use: Not Currently     Alcohol/week: 1.0 standard drink of alcohol     Types: 1 Cans of beer per week     Comment: occasional    Drug use: Yes     Types: Marijuana     Comment: Gummies    Sexual activity: Not Currently     Partners: Female     Social Determinants of Health     Financial Resource Strain: Medium Risk (6/18/2024)    Overall Financial Resource Strain (CARDIA)     Difficulty of Paying Living Expenses: Somewhat hard   Food Insecurity: Food Insecurity Present (6/18/2024)    Hunger Vital Sign     Worried About Running Out of Food in the Last Year: Sometimes true     Ran Out of Food in the Last Year: Never true   Transportation Needs: No Transportation Needs (7/17/2023)    PRAPARE - Transportation     Lack of Transportation (Medical): No     Lack of Transportation (Non-Medical): No   Physical Activity: Insufficiently Active (6/18/2024)    Exercise Vital Sign     Days of Exercise per Week:  "3 days     Minutes of Exercise per Session: 30 min   Stress: Stress Concern Present (6/18/2024)    [employer] [place] [employer] - Occupational Stress Questionnaire     Feeling of Stress : To some extent   Housing Stability: Low Risk  (7/17/2023)    Housing Stability Vital Sign     Unable to Pay for Housing in the Last Year: No     Number of Places Lived in the Last Year: 1     Unstable Housing in the Last Year: No       Review of patient's allergies indicates:  No Known Allergies    OBJECTIVE:   Vital Signs:  Vitals:    06/21/24 0738   BP: 136/78   Pulse: 72   Resp: 14   Temp: 99 °F (37.2 °C)   TempSrc: Oral   SpO2: 100%   Weight: 121.6 kg (268 lb)   Height: 6' 0.99" (1.854 m)       No results found for this or any previous visit (from the past 24 hour(s)).      Physical Exam  Constitutional:       Appearance: Normal appearance.   HENT:      Head: Normocephalic and atraumatic.      Nose: Nose normal.   Eyes:      Extraocular Movements: Extraocular movements intact.      Conjunctiva/sclera: Conjunctivae normal.      Pupils: Pupils are equal, round, and reactive to light.   Cardiovascular:      Rate and Rhythm: Normal rate and regular rhythm.      Pulses: Normal pulses.   Pulmonary:      Effort: Pulmonary effort is normal.   Abdominal:      General: Abdomen is flat.   Musculoskeletal:         General: Normal range of motion.   Skin:     General: Skin is warm.      Capillary Refill: Capillary refill takes less than 2 seconds.   Neurological:      General: No focal deficit present.      Mental Status: He is alert.   Psychiatric:         Mood and Affect: Mood normal.         Laboratory  CMP:   Recent Labs   Lab 10/03/23  1158 10/05/23  0529 03/10/24  1306 06/19/24  1008   Sodium 139 141 139 135 L   Potassium 4.2 4.8 4.0 4.4   CO2 23 23 22 L 26   Blood Urea Nitrogen 20.4 12.7 20.9 18.0   Creatinine 0.93 0.84 0.86 0.96   Glucose 105 112 117 H 109   Calcium 10.0 9.4 9.5 10.1 H   Albumin 4.0  --  3.9 4.1 "   Bilirubin Total 0.9  --  0.5 0.6   AST 26  --  27 26   ALT 40  --  34 36     --  91 90     CBC:   Recent Labs   Lab 10/05/23  0529 03/10/24  1306 06/19/24  1008   WBC 7.28 7.58 7.91   Neut # 3.93 5.33 4.90   RBC 4.70 4.86 5.32   Hgb 14.7 15.2 16.4   Hct 43.9 44.4 49.2   Platelet 218 218 233   MCV 93.4 91.4 92.5   RDW 13.5 13.6 13.2     FLP:   Recent Labs   Lab 12/05/22  0959 06/06/23  1110 06/19/24  1008   Cholesterol Total 154 166 195   HDL Cholesterol 32 L 31 L 36   LDL Cholesterol 87.00 108.00 127.00   Triglyceride 174 H 135 160 H     DM:   Recent Labs   Lab 06/06/23  1110 06/24/23  1226 07/28/23  1001 08/01/23  1113 10/05/23  0529 03/10/24  1306 06/19/24  1008 06/19/24  1009   Hemoglobin A1c 5.8  --  5.5  --   --   --  5.9  --    Urine Creatinine  --   --   --   --   --   --   --  65.3   Creatinine 0.83   < > 0.84   < > 0.84 0.86 0.96  --     < > = values in this interval not displayed.     Thyroid:   Recent Labs   Lab 06/06/23  1110 10/03/23  1158 06/19/24  1008   TSH 1.917 1.789 2.246   Thyroxine Free  --   --  0.91     Anemia:   Recent Labs   Lab 06/19/24  1008   Hgb 16.4   Hct 49.2           ASSESSMENT & PLAN:     Corey was seen today for pre-op exam.    Diagnoses and all orders for this visit:    Bradycardia  -     IN OFFICE EKG 12-LEAD (to Muse)    Primary hypertension    Spinal cord compression    Preop examination  -     X-Ray Chest PA And Lateral; Future        There are no preventive care reminders to display for this patient.      Perioperative Cardiovascular Risk Assessment and Management for Noncardiac Procedure/Surgery  - Planned/Proposed Procedure/Surgery: Lumbar Fusion  - Timing of Procedure/Surgery: Elective  - Risk of Procedure/Surgery: Low to Moderate Risk  - METS: 6.36  - DASI: 29.45  - Active Cardiac Conditions: HTN, CHB s/p PPM  - RCRI Score: 0  - RCRI Risk Factors: None  - Other CV Risk Factors: HLD  - The patient is low risk for a low to moderate risk procedure, no further  cardiac work up necessary.  - He is without modifiable risk factors that could be performed prior to her surgery that would warrant delay       Future Appointments     Future Appointments   Date Time Provider Department Center   6/26/2024 10:45 AM [facility] OIC-MRI2 [facility]   6/27/2024 10:40 AM PRE-ADMIT, MARICHUY NURSE DANI [facility] PREADMT [place] [facility]   7/31/2024  9:00 AM [facility] ODC XR-A LIMIT 350 LBS [facility] XRAY OP [place] [facility]   7/31/2024 10:00 AM Syeda Ceja PA-C [facility] NEUROSU [place] [facility]   9/3/2024 10:00 AM [facility] ODC XR-A LIMIT 350 LBS [facility] XRAY OP [place] [facility]   9/3/2024 11:00 AM Syeda Ceja PA-C [facility] NEUROSU [place] [facility]   10/23/2024 10:30 AM [facility] ODC XR-A LIMIT 350 LBS [facility] XRAY OP [place] [facility]   10/23/2024 11:30 AM Kael Flannery MD [facility] NEUROSU [place] [facility]   12/19/2024 10:40 AM Mati Musa, [facility] Un      Orders Placed This Encounter   Procedures    X-Ray Chest PA And Lateral     Standing Status:   Future     Standing Expiration Date:   6/21/2025     Order Specific Question:   May the Radiologist modify the order per protocol to meet the clinical needs of the patient?     Answer:   Yes     Order Specific Question:   Release to patient     Answer:   Immediate    IN OFFICE EKG 12-LEAD (to Muse)         Discussed with Dr. Aguila  - Staff Attestation to Follow    Ramsey Archer MD  [facility] INTERNAL MEDICINE PGY-2  06/21/2024 8:06 AM  [facility]

## 2024-06-26 ENCOUNTER — HOSPITAL ENCOUNTER (OUTPATIENT)
Dept: RADIOLOGY | Facility: HOSPITAL | Age: 69
Discharge: HOME | End: 2024-06-26
Attending: NEUROLOGICAL SURGERY
Payer: MEDICARE

## 2024-06-26 DIAGNOSIS — M48.07 SPINAL STENOSIS, LUMBOSACRAL REGION: ICD-10-CM

## 2024-06-27 ENCOUNTER — HOSPITAL ENCOUNTER (OUTPATIENT)
Dept: PREADMISSION TESTING | Facility: HOSPITAL | Age: 69
Discharge: HOME | End: 2024-06-27
Attending: NURSE PRACTITIONER
Payer: MEDICARE

## 2024-06-27 ENCOUNTER — TELEPHONE (OUTPATIENT)
Dept: PREADMISSION TESTING | Facility: HOSPITAL | Age: 69
End: 2024-06-27
Payer: MEDICARE

## 2024-06-27 NOTE — TELEPHONE ENCOUNTER
Spoke to the patient he is very upset because his surgery is suppose to be scheduled on 7/19/24 not on 7/12/24. Also very upset because he went to [place] to get his MRI on 6/25/24 and his pacemaker needed to be turned off prior to the MRI. Please reach out to him.

## 2024-06-27 NOTE — PRE-PROCEDURE INSTRUCTIONS
Admit.    Allergies, medical, surgical, family and psychosocial histories reviewed with patient. Periop plan of care reviewed. Preop instructions given, including medications to take and to hold. Hibiclens soap and instructions on use given. Time allotted for questions to be addressed.      Arrival time 0530.    Instructed to take Metoprolol and Lisinopril the morning of surgery.       Ted Jeansonne with [facility] [phone] informed of pacemaker.     [facility]ed to [facility] for updated Pacemaker Interrogation

## 2024-06-27 NOTE — DISCHARGE INSTRUCTIONS

## 2024-07-03 ENCOUNTER — TELEPHONE (OUTPATIENT)
Dept: PREADMISSION TESTING | Facility: HOSPITAL | Age: 69
End: 2024-07-03
Payer: MEDICARE

## 2024-07-03 NOTE — TELEPHONE ENCOUNTER
Left message for [facility] for updated pacemaker interrogation. [phone]

## 2024-07-11 ENCOUNTER — TELEPHONE (OUTPATIENT)
Dept: RADIOLOGY | Facility: HOSPITAL | Age: 69
End: 2024-07-11
Payer: MEDICARE

## 2024-07-22 ENCOUNTER — HOSPITAL ENCOUNTER (OUTPATIENT)
Dept: RADIOLOGY | Facility: HOSPITAL | Age: 69
Discharge: HOME | End: 2024-07-22
Attending: NEUROLOGICAL SURGERY
Payer: MEDICARE

## 2024-07-22 PROCEDURE — 72148 MRI LUMBAR SPINE W/O DYE: CPT | Mod: TC

## 2024-07-31 ENCOUNTER — NURSE TRIAGE (OUTPATIENT)
Dept: ADMINISTRATIVE | Facility: CLINIC | Age: 69
End: 2024-07-31
Payer: MEDICARE

## 2024-07-31 NOTE — TELEPHONE ENCOUNTER
Pt calling to find out arrival time for procedure scheduled 8/5. Procedure time noted for 0915,. Informed most often arrival time ab 1.5hrs prior to scheduled time, would recommend to be there by 0730, but that different procedures may require different times for preop. Informed message would be routed to clinic requesting callback. Pt vu.     Reason for Disposition   [1] Caller requesting NON-URGENT health information AND [2] PCP's office is the best resource    Protocols used: Information Only Call - No Triage-A-

## 2024-08-05 ENCOUNTER — HOSPITAL ENCOUNTER (INPATIENT)
Facility: HOSPITAL | Age: 69
LOS: 4 days | Discharge: HOME HEALTH | DRG: 455 | End: 2024-08-09
Attending: NEUROLOGICAL SURGERY | Admitting: NEUROLOGICAL SURGERY
Payer: MEDICARE

## 2024-08-05 ENCOUNTER — ANESTHESIA EVENT (OUTPATIENT)
Dept: SURGERY | Facility: HOSPITAL | Age: 69
End: 2024-08-05
Payer: MEDICARE

## 2024-08-05 ENCOUNTER — ANESTHESIA (OUTPATIENT)
Dept: SURGERY | Facility: HOSPITAL | Age: 69
End: 2024-08-05
Payer: MEDICARE

## 2024-08-05 DIAGNOSIS — M48.062 SPINAL STENOSIS OF LUMBAR REGION WITH NEUROGENIC CLAUDICATION: Primary | ICD-10-CM

## 2024-08-05 DIAGNOSIS — M48.061 FORAMINAL STENOSIS OF LUMBAR REGION: ICD-10-CM

## 2024-08-05 DIAGNOSIS — M43.06 PARS DEFECT OF LUMBAR SPINE: ICD-10-CM

## 2024-08-05 LAB
ABO + RH BLD: NORMAL
BLD GP AB SCN CELLS X3 SERPL QL: NORMAL
SPECIMEN OUTDATE: NORMAL

## 2024-08-05 PROCEDURE — 11000001 HC ACUTE MED/SURG PRIVATE ROOM

## 2024-08-05 PROCEDURE — 63047 LAM FACETEC & FORAMOT LUMBAR: CPT | Mod: 59,,, | Performed by: NEUROLOGICAL SURGERY

## 2024-08-05 PROCEDURE — 22842 INSERT SPINE FIXATION DEVICE: CPT | Mod: ,,, | Performed by: NEUROLOGICAL SURGERY

## 2024-08-05 PROCEDURE — 71000033 HC RECOVERY, INTIAL HOUR: Performed by: NEUROLOGICAL SURGERY

## 2024-08-05 PROCEDURE — C1713 ANCHOR/SCREW BN/BN,TIS/BN: HCPCS | Performed by: NEUROLOGICAL SURGERY

## 2024-08-05 PROCEDURE — 27800903 OPTIME MED/SURG SUP & DEVICES OTHER IMPLANTS: Performed by: NEUROLOGICAL SURGERY

## 2024-08-05 PROCEDURE — 25000003 PHARM REV CODE 250: Performed by: ANESTHESIOLOGY

## 2024-08-05 PROCEDURE — 61783 SCAN PROC SPINAL: CPT | Mod: 59,,, | Performed by: NEUROLOGICAL SURGERY

## 2024-08-05 PROCEDURE — 86850 RBC ANTIBODY SCREEN: CPT | Performed by: NEUROLOGICAL SURGERY

## 2024-08-05 PROCEDURE — D9220A PRA ANESTHESIA: Mod: CRNA,,, | Performed by: NURSE ANESTHETIST, CERTIFIED REGISTERED

## 2024-08-05 PROCEDURE — D9220A PRA ANESTHESIA: Mod: ANES,,, | Performed by: ANESTHESIOLOGY

## 2024-08-05 PROCEDURE — 22853 INSJ BIOMECHANICAL DEVICE: CPT | Mod: ,,, | Performed by: NEUROLOGICAL SURGERY

## 2024-08-05 PROCEDURE — P9045 ALBUMIN (HUMAN), 5%, 250 ML: HCPCS | Mod: JZ,JG | Performed by: NURSE ANESTHETIST, CERTIFIED REGISTERED

## 2024-08-05 PROCEDURE — 63600175 PHARM REV CODE 636 W HCPCS: Performed by: ANESTHESIOLOGY

## 2024-08-05 PROCEDURE — 22558 ARTHRD ANT NTRBD MIN DSC LUM: CPT | Mod: 59,,, | Performed by: NEUROLOGICAL SURGERY

## 2024-08-05 PROCEDURE — 3E0U0GB INTRODUCTION OF RECOMBINANT BONE MORPHOGENETIC PROTEIN INTO JOINTS, OPEN APPROACH: ICD-10-PCS | Performed by: NEUROLOGICAL SURGERY

## 2024-08-05 PROCEDURE — 25000003 PHARM REV CODE 250: Performed by: NEUROLOGICAL SURGERY

## 2024-08-05 PROCEDURE — 94799 UNLISTED PULMONARY SVC/PX: CPT

## 2024-08-05 PROCEDURE — 36000710: Performed by: NEUROLOGICAL SURGERY

## 2024-08-05 PROCEDURE — 63600175 PHARM REV CODE 636 W HCPCS: Mod: JZ,JG | Performed by: NURSE ANESTHETIST, CERTIFIED REGISTERED

## 2024-08-05 PROCEDURE — 22633 ARTHRD CMBN 1NTRSPC LUMBAR: CPT | Mod: ,,, | Performed by: NEUROLOGICAL SURGERY

## 2024-08-05 PROCEDURE — 86900 BLOOD TYPING SEROLOGIC ABO: CPT | Performed by: NEUROLOGICAL SURGERY

## 2024-08-05 PROCEDURE — 25000003 PHARM REV CODE 250: Performed by: NURSE ANESTHETIST, CERTIFIED REGISTERED

## 2024-08-05 PROCEDURE — 20930 SP BONE ALGRFT MORSEL ADD-ON: CPT | Mod: ,,, | Performed by: NEUROLOGICAL SURGERY

## 2024-08-05 PROCEDURE — 0SG00A0 FUSION OF LUMBAR VERTEBRAL JOINT WITH INTERBODY FUSION DEVICE, ANTERIOR APPROACH, ANTERIOR COLUMN, OPEN APPROACH: ICD-10-PCS | Performed by: NEUROLOGICAL SURGERY

## 2024-08-05 PROCEDURE — 0ST40ZZ RESECTION OF LUMBOSACRAL DISC, OPEN APPROACH: ICD-10-PCS | Performed by: NEUROLOGICAL SURGERY

## 2024-08-05 PROCEDURE — 63600175 PHARM REV CODE 636 W HCPCS: Performed by: NEUROLOGICAL SURGERY

## 2024-08-05 PROCEDURE — 27201423 OPTIME MED/SURG SUP & DEVICES STERILE SUPPLY: Performed by: NEUROLOGICAL SURGERY

## 2024-08-05 PROCEDURE — 25000003 PHARM REV CODE 250

## 2024-08-05 PROCEDURE — 0SG30AJ FUSION OF LUMBOSACRAL JOINT WITH INTERBODY FUSION DEVICE, POSTERIOR APPROACH, ANTERIOR COLUMN, OPEN APPROACH: ICD-10-PCS | Performed by: NEUROLOGICAL SURGERY

## 2024-08-05 PROCEDURE — 37000008 HC ANESTHESIA 1ST 15 MINUTES: Performed by: NEUROLOGICAL SURGERY

## 2024-08-05 PROCEDURE — 36415 COLL VENOUS BLD VENIPUNCTURE: CPT | Performed by: NEUROLOGICAL SURGERY

## 2024-08-05 PROCEDURE — 01NB0ZZ RELEASE LUMBAR NERVE, OPEN APPROACH: ICD-10-PCS | Performed by: NEUROLOGICAL SURGERY

## 2024-08-05 PROCEDURE — 0SG0071 FUSION OF LUMBAR VERTEBRAL JOINT WITH AUTOLOGOUS TISSUE SUBSTITUTE, POSTERIOR APPROACH, POSTERIOR COLUMN, OPEN APPROACH: ICD-10-PCS | Performed by: NEUROLOGICAL SURGERY

## 2024-08-05 PROCEDURE — C1729 CATH, DRAINAGE: HCPCS | Performed by: NEUROLOGICAL SURGERY

## 2024-08-05 PROCEDURE — 36000711: Performed by: NEUROLOGICAL SURGERY

## 2024-08-05 PROCEDURE — 71000039 HC RECOVERY, EACH ADD'L HOUR: Performed by: NEUROLOGICAL SURGERY

## 2024-08-05 PROCEDURE — 20936 SP BONE AGRFT LOCAL ADD-ON: CPT | Mod: ,,, | Performed by: NEUROLOGICAL SURGERY

## 2024-08-05 PROCEDURE — 37000009 HC ANESTHESIA EA ADD 15 MINS: Performed by: NEUROLOGICAL SURGERY

## 2024-08-05 PROCEDURE — 94761 N-INVAS EAR/PLS OXIMETRY MLT: CPT

## 2024-08-05 PROCEDURE — 0SG3071 FUSION OF LUMBOSACRAL JOINT WITH AUTOLOGOUS TISSUE SUBSTITUTE, POSTERIOR APPROACH, POSTERIOR COLUMN, OPEN APPROACH: ICD-10-PCS | Performed by: NEUROLOGICAL SURGERY

## 2024-08-05 DEVICE — CHIPS CRUSH CANCELLOUS 15ML: Type: IMPLANTABLE DEVICE | Site: SPINE LUMBAR | Status: FUNCTIONAL

## 2024-08-05 DEVICE — SCREW SET SPINAL SINGLE INNER: Type: IMPLANTABLE DEVICE | Site: SPINE LUMBAR | Status: FUNCTIONAL

## 2024-08-05 DEVICE — GRAFT BONE PRIME DBM HD 5CC: Type: IMPLANTABLE DEVICE | Site: SPINE LUMBAR | Status: FUNCTIONAL

## 2024-08-05 DEVICE — IMPLANTABLE DEVICE: Type: IMPLANTABLE DEVICE | Site: SPINE LUMBAR | Status: FUNCTIONAL

## 2024-08-05 DEVICE — KIT BONE GRFT BMP XS: Type: IMPLANTABLE DEVICE | Site: SPINE LUMBAR | Status: FUNCTIONAL

## 2024-08-05 RX ORDER — ACETAMINOPHEN 325 MG/1
650 TABLET ORAL EVERY 6 HOURS
Status: DISCONTINUED | OUTPATIENT
Start: 2024-08-05 | End: 2024-08-09 | Stop reason: HOSPADM

## 2024-08-05 RX ORDER — OXYCODONE HYDROCHLORIDE 5 MG/1
10 TABLET ORAL EVERY 6 HOURS PRN
Status: DISCONTINUED | OUTPATIENT
Start: 2024-08-05 | End: 2024-08-09 | Stop reason: HOSPADM

## 2024-08-05 RX ORDER — HEPARIN SODIUM 5000 [USP'U]/ML
5000 INJECTION, SOLUTION INTRAVENOUS; SUBCUTANEOUS EVERY 12 HOURS
Status: DISCONTINUED | OUTPATIENT
Start: 2024-08-05 | End: 2024-08-09 | Stop reason: HOSPADM

## 2024-08-05 RX ORDER — PREGABALIN 75 MG/1
75 CAPSULE ORAL
Status: COMPLETED | OUTPATIENT
Start: 2024-08-05 | End: 2024-08-05

## 2024-08-05 RX ORDER — ALUMINUM HYDROXIDE, MAGNESIUM HYDROXIDE, AND SIMETHICONE 1200; 120; 1200 MG/30ML; MG/30ML; MG/30ML
30 SUSPENSION ORAL EVERY 4 HOURS PRN
Status: DISCONTINUED | OUTPATIENT
Start: 2024-08-05 | End: 2024-08-09 | Stop reason: HOSPADM

## 2024-08-05 RX ORDER — PROCHLORPERAZINE EDISYLATE 5 MG/ML
5 INJECTION INTRAMUSCULAR; INTRAVENOUS EVERY 6 HOURS PRN
Status: DISCONTINUED | OUTPATIENT
Start: 2024-08-05 | End: 2024-08-09 | Stop reason: HOSPADM

## 2024-08-05 RX ORDER — ACETAMINOPHEN 325 MG/1
650 TABLET ORAL
Status: COMPLETED | OUTPATIENT
Start: 2024-08-05 | End: 2024-08-05

## 2024-08-05 RX ORDER — GLUCAGON 1 MG
1 KIT INJECTION
Status: DISCONTINUED | OUTPATIENT
Start: 2024-08-05 | End: 2024-08-05 | Stop reason: HOSPADM

## 2024-08-05 RX ORDER — DEXAMETHASONE SODIUM PHOSPHATE 4 MG/ML
INJECTION, SOLUTION INTRA-ARTICULAR; INTRALESIONAL; INTRAMUSCULAR; INTRAVENOUS; SOFT TISSUE
Status: DISCONTINUED | OUTPATIENT
Start: 2024-08-05 | End: 2024-08-05

## 2024-08-05 RX ORDER — EPHEDRINE SULFATE 50 MG/ML
INJECTION, SOLUTION INTRAVENOUS
Status: DISCONTINUED | OUTPATIENT
Start: 2024-08-05 | End: 2024-08-05

## 2024-08-05 RX ORDER — MIDAZOLAM HYDROCHLORIDE 1 MG/ML
INJECTION, SOLUTION INTRAMUSCULAR; INTRAVENOUS
Status: DISCONTINUED | OUTPATIENT
Start: 2024-08-05 | End: 2024-08-05

## 2024-08-05 RX ORDER — METOPROLOL SUCCINATE 50 MG/1
50 TABLET, EXTENDED RELEASE ORAL DAILY
Status: DISCONTINUED | OUTPATIENT
Start: 2024-08-06 | End: 2024-08-09 | Stop reason: HOSPADM

## 2024-08-05 RX ORDER — ONDANSETRON HYDROCHLORIDE 2 MG/ML
4 INJECTION, SOLUTION INTRAVENOUS ONCE AS NEEDED
Status: DISCONTINUED | OUTPATIENT
Start: 2024-08-05 | End: 2024-08-05 | Stop reason: HOSPADM

## 2024-08-05 RX ORDER — LIDOCAINE HYDROCHLORIDE 20 MG/ML
INJECTION INTRAVENOUS
Status: DISCONTINUED | OUTPATIENT
Start: 2024-08-05 | End: 2024-08-05

## 2024-08-05 RX ORDER — KETAMINE HCL IN 0.9 % NACL 50 MG/5 ML
SYRINGE (ML) INTRAVENOUS
Status: DISCONTINUED | OUTPATIENT
Start: 2024-08-05 | End: 2024-08-05

## 2024-08-05 RX ORDER — MEPERIDINE HYDROCHLORIDE 50 MG/ML
12.5 INJECTION INTRAMUSCULAR; INTRAVENOUS; SUBCUTANEOUS ONCE AS NEEDED
Status: DISCONTINUED | OUTPATIENT
Start: 2024-08-05 | End: 2024-08-05 | Stop reason: HOSPADM

## 2024-08-05 RX ORDER — HYDROMORPHONE HYDROCHLORIDE 2 MG/ML
2 INJECTION, SOLUTION INTRAMUSCULAR; INTRAVENOUS; SUBCUTANEOUS
Status: DISCONTINUED | OUTPATIENT
Start: 2024-08-05 | End: 2024-08-09 | Stop reason: HOSPADM

## 2024-08-05 RX ORDER — PROPOFOL 10 MG/ML
VIAL (ML) INTRAVENOUS
Status: DISCONTINUED | OUTPATIENT
Start: 2024-08-05 | End: 2024-08-05

## 2024-08-05 RX ORDER — GABAPENTIN 400 MG/1
400 CAPSULE ORAL 2 TIMES DAILY
Status: DISCONTINUED | OUTPATIENT
Start: 2024-08-05 | End: 2024-08-09 | Stop reason: HOSPADM

## 2024-08-05 RX ORDER — ONDANSETRON HYDROCHLORIDE 2 MG/ML
INJECTION, SOLUTION INTRAVENOUS
Status: DISCONTINUED | OUTPATIENT
Start: 2024-08-05 | End: 2024-08-05

## 2024-08-05 RX ORDER — CEFAZOLIN SODIUM 2 G/50ML
2 SOLUTION INTRAVENOUS ONCE
Status: DISCONTINUED | OUTPATIENT
Start: 2024-08-05 | End: 2024-08-05

## 2024-08-05 RX ORDER — AMOXICILLIN 250 MG
2 CAPSULE ORAL NIGHTLY PRN
Status: DISCONTINUED | OUTPATIENT
Start: 2024-08-05 | End: 2024-08-09 | Stop reason: HOSPADM

## 2024-08-05 RX ORDER — FENTANYL CITRATE 50 UG/ML
INJECTION, SOLUTION INTRAMUSCULAR; INTRAVENOUS
Status: DISCONTINUED | OUTPATIENT
Start: 2024-08-05 | End: 2024-08-05

## 2024-08-05 RX ORDER — METHOCARBAMOL 750 MG/1
750 TABLET, FILM COATED ORAL 3 TIMES DAILY
Status: DISCONTINUED | OUTPATIENT
Start: 2024-08-05 | End: 2024-08-09 | Stop reason: HOSPADM

## 2024-08-05 RX ORDER — TRAMADOL HYDROCHLORIDE 50 MG/1
50 TABLET ORAL EVERY 6 HOURS PRN
Status: DISCONTINUED | OUTPATIENT
Start: 2024-08-05 | End: 2024-08-09 | Stop reason: HOSPADM

## 2024-08-05 RX ORDER — NEOMYCIN SULFATE, POLYMYXIN B SULFATE, BACITRACIN ZINC, HYDROCORTISONE 3.5; 10000; 400; 1 MG/G; [USP'U]/G; [USP'U]/G; MG/G
OINTMENT OPHTHALMIC
Status: DISCONTINUED | OUTPATIENT
Start: 2024-08-05 | End: 2024-08-05 | Stop reason: HOSPADM

## 2024-08-05 RX ORDER — OXYCODONE HCL 10 MG/1
10 TABLET, FILM COATED, EXTENDED RELEASE ORAL
Status: COMPLETED | OUTPATIENT
Start: 2024-08-05 | End: 2024-08-05

## 2024-08-05 RX ORDER — KETOROLAC TROMETHAMINE 30 MG/ML
15 INJECTION, SOLUTION INTRAMUSCULAR; INTRAVENOUS EVERY 6 HOURS
Status: COMPLETED | OUTPATIENT
Start: 2024-08-05 | End: 2024-08-07

## 2024-08-05 RX ORDER — PHENYLEPHRINE HYDROCHLORIDE 10 MG/ML
INJECTION INTRAVENOUS
Status: DISCONTINUED | OUTPATIENT
Start: 2024-08-05 | End: 2024-08-05

## 2024-08-05 RX ORDER — ROCURONIUM BROMIDE 10 MG/ML
INJECTION, SOLUTION INTRAVENOUS
Status: DISCONTINUED | OUTPATIENT
Start: 2024-08-05 | End: 2024-08-05

## 2024-08-05 RX ORDER — HYDROMORPHONE HYDROCHLORIDE 2 MG/ML
INJECTION, SOLUTION INTRAMUSCULAR; INTRAVENOUS; SUBCUTANEOUS
Status: DISCONTINUED | OUTPATIENT
Start: 2024-08-05 | End: 2024-08-05

## 2024-08-05 RX ORDER — CELECOXIB 100 MG/1
200 CAPSULE ORAL
Status: COMPLETED | OUTPATIENT
Start: 2024-08-05 | End: 2024-08-05

## 2024-08-05 RX ORDER — HYDROMORPHONE HYDROCHLORIDE 2 MG/ML
0.2 INJECTION, SOLUTION INTRAMUSCULAR; INTRAVENOUS; SUBCUTANEOUS EVERY 5 MIN PRN
Status: DISCONTINUED | OUTPATIENT
Start: 2024-08-05 | End: 2024-08-05 | Stop reason: HOSPADM

## 2024-08-05 RX ORDER — SODIUM CHLORIDE, SODIUM LACTATE, POTASSIUM CHLORIDE, CALCIUM CHLORIDE 600; 310; 30; 20 MG/100ML; MG/100ML; MG/100ML; MG/100ML
INJECTION, SOLUTION INTRAVENOUS CONTINUOUS
Status: DISCONTINUED | OUTPATIENT
Start: 2024-08-05 | End: 2024-08-06

## 2024-08-05 RX ORDER — ONDANSETRON 8 MG/1
8 TABLET, ORALLY DISINTEGRATING ORAL EVERY 6 HOURS PRN
Status: DISCONTINUED | OUTPATIENT
Start: 2024-08-05 | End: 2024-08-09 | Stop reason: HOSPADM

## 2024-08-05 RX ORDER — BISACODYL 10 MG/1
10 SUPPOSITORY RECTAL DAILY
Status: DISCONTINUED | OUTPATIENT
Start: 2024-08-06 | End: 2024-08-09 | Stop reason: HOSPADM

## 2024-08-05 RX ORDER — ALBUMIN HUMAN 50 G/1000ML
SOLUTION INTRAVENOUS
Status: DISCONTINUED | OUTPATIENT
Start: 2024-08-05 | End: 2024-08-05

## 2024-08-05 RX ORDER — PHENYLEPHRINE HYDROCHLORIDE 10 MG/ML
INJECTION INTRAVENOUS CONTINUOUS PRN
Status: DISCONTINUED | OUTPATIENT
Start: 2024-08-05 | End: 2024-08-05

## 2024-08-05 RX ORDER — VASOPRESSIN 20 [USP'U]/ML
INJECTION, SOLUTION INTRAMUSCULAR; SUBCUTANEOUS
Status: DISCONTINUED | OUTPATIENT
Start: 2024-08-05 | End: 2024-08-05

## 2024-08-05 RX ORDER — MUPIROCIN 20 MG/G
OINTMENT TOPICAL 2 TIMES DAILY
Status: COMPLETED | OUTPATIENT
Start: 2024-08-05 | End: 2024-08-07

## 2024-08-05 RX ORDER — OXYCODONE HYDROCHLORIDE 5 MG/1
5 TABLET ORAL
Status: DISCONTINUED | OUTPATIENT
Start: 2024-08-05 | End: 2024-08-05 | Stop reason: HOSPADM

## 2024-08-05 RX ORDER — SODIUM CHLORIDE 0.9 % (FLUSH) 0.9 %
3 SYRINGE (ML) INJECTION
Status: DISCONTINUED | OUTPATIENT
Start: 2024-08-05 | End: 2024-08-05

## 2024-08-05 RX ADMIN — KETOROLAC TROMETHAMINE 15 MG: 30 INJECTION, SOLUTION INTRAMUSCULAR; INTRAVENOUS at 05:08

## 2024-08-05 RX ADMIN — FENTANYL CITRATE 150 MCG: 50 INJECTION INTRAMUSCULAR; INTRAVENOUS at 08:08

## 2024-08-05 RX ADMIN — HYDROMORPHONE HYDROCHLORIDE 0.2 MG: 2 INJECTION INTRAMUSCULAR; INTRAVENOUS; SUBCUTANEOUS at 02:08

## 2024-08-05 RX ADMIN — ALBUMIN (HUMAN) 250 ML: 12.5 SOLUTION INTRAVENOUS at 09:08

## 2024-08-05 RX ADMIN — DEXAMETHASONE SODIUM PHOSPHATE 8 MG: 4 INJECTION, SOLUTION INTRA-ARTICULAR; INTRALESIONAL; INTRAMUSCULAR; INTRAVENOUS; SOFT TISSUE at 07:08

## 2024-08-05 RX ADMIN — PHENYLEPHRINE HYDROCHLORIDE 200 MCG: 10 INJECTION INTRAVENOUS at 08:08

## 2024-08-05 RX ADMIN — PHENYLEPHRINE HYDROCHLORIDE 0.5 MCG/KG/MIN: 10 INJECTION INTRAVENOUS at 08:08

## 2024-08-05 RX ADMIN — EPHEDRINE SULFATE 10 MG: 50 INJECTION, SOLUTION INTRAMUSCULAR; INTRAVENOUS; SUBCUTANEOUS at 08:08

## 2024-08-05 RX ADMIN — FENTANYL CITRATE 100 MCG: 50 INJECTION INTRAMUSCULAR; INTRAVENOUS at 12:08

## 2024-08-05 RX ADMIN — VASOPRESSIN 1 UNITS: 20 INJECTION INTRAVENOUS at 08:08

## 2024-08-05 RX ADMIN — OXYCODONE HYDROCHLORIDE 10 MG: 10 TABLET, FILM COATED, EXTENDED RELEASE ORAL at 06:08

## 2024-08-05 RX ADMIN — ALBUMIN (HUMAN) 250 ML: 12.5 SOLUTION INTRAVENOUS at 07:08

## 2024-08-05 RX ADMIN — PROPOFOL 200 MG: 10 INJECTION, EMULSION INTRAVENOUS at 07:08

## 2024-08-05 RX ADMIN — LIDOCAINE HYDROCHLORIDE 100 MG: 20 INJECTION, SOLUTION INTRAVENOUS at 07:08

## 2024-08-05 RX ADMIN — EPHEDRINE SULFATE 15 MG: 50 INJECTION, SOLUTION INTRAMUSCULAR; INTRAVENOUS; SUBCUTANEOUS at 09:08

## 2024-08-05 RX ADMIN — CEFAZOLIN 3 G: 2 INJECTION, POWDER, FOR SOLUTION INTRAMUSCULAR; INTRAVENOUS at 09:08

## 2024-08-05 RX ADMIN — DEXTROSE 3 G: 50 INJECTION, SOLUTION INTRAVENOUS at 07:08

## 2024-08-05 RX ADMIN — SODIUM CHLORIDE: 0.9 INJECTION, SOLUTION INTRAVENOUS at 12:08

## 2024-08-05 RX ADMIN — METHOCARBAMOL TABLETS 750 MG: 750 TABLET, COATED ORAL at 09:08

## 2024-08-05 RX ADMIN — DEXTROSE 3 G: 50 INJECTION, SOLUTION INTRAVENOUS at 11:08

## 2024-08-05 RX ADMIN — ALBUMIN (HUMAN) 250 ML: 12.5 SOLUTION INTRAVENOUS at 08:08

## 2024-08-05 RX ADMIN — OXYCODONE 5 MG: 5 TABLET ORAL at 03:08

## 2024-08-05 RX ADMIN — VASOPRESSIN 2 UNITS: 20 INJECTION INTRAVENOUS at 08:08

## 2024-08-05 RX ADMIN — ONDANSETRON 8 MG: 2 INJECTION, SOLUTION INTRAMUSCULAR; INTRAVENOUS at 12:08

## 2024-08-05 RX ADMIN — Medication 30 MG: at 12:08

## 2024-08-05 RX ADMIN — ACETAMINOPHEN 650 MG: 325 TABLET ORAL at 06:08

## 2024-08-05 RX ADMIN — EPHEDRINE SULFATE 15 MG: 50 INJECTION, SOLUTION INTRAMUSCULAR; INTRAVENOUS; SUBCUTANEOUS at 12:08

## 2024-08-05 RX ADMIN — GABAPENTIN 400 MG: 400 CAPSULE ORAL at 09:08

## 2024-08-05 RX ADMIN — ACETAMINOPHEN 650 MG: 325 TABLET ORAL at 11:08

## 2024-08-05 RX ADMIN — Medication 20 MG: at 09:08

## 2024-08-05 RX ADMIN — SODIUM CHLORIDE, POTASSIUM CHLORIDE, SODIUM LACTATE AND CALCIUM CHLORIDE: 600; 310; 30; 20 INJECTION, SOLUTION INTRAVENOUS at 05:08

## 2024-08-05 RX ADMIN — MIDAZOLAM 2 MG: 1 INJECTION INTRAMUSCULAR; INTRAVENOUS at 07:08

## 2024-08-05 RX ADMIN — PREGABALIN 75 MG: 75 CAPSULE ORAL at 06:08

## 2024-08-05 RX ADMIN — PROPOFOL 100 MG: 10 INJECTION, EMULSION INTRAVENOUS at 12:08

## 2024-08-05 RX ADMIN — MUPIROCIN: 20 OINTMENT TOPICAL at 09:08

## 2024-08-05 RX ADMIN — SODIUM CHLORIDE, SODIUM LACTATE, POTASSIUM CHLORIDE, AND CALCIUM CHLORIDE: .6; .31; .03; .02 INJECTION, SOLUTION INTRAVENOUS at 07:08

## 2024-08-05 RX ADMIN — ACETAMINOPHEN 650 MG: 325 TABLET ORAL at 05:08

## 2024-08-05 RX ADMIN — GLYCOPYRROLATE 0.2 MG: 0.2 INJECTION, SOLUTION INTRAMUSCULAR; INTRAVITREAL at 10:08

## 2024-08-05 RX ADMIN — CELECOXIB 200 MG: 100 CAPSULE ORAL at 06:08

## 2024-08-05 RX ADMIN — FENTANYL CITRATE 100 MCG: 50 INJECTION INTRAMUSCULAR; INTRAVENOUS at 07:08

## 2024-08-05 RX ADMIN — KETOROLAC TROMETHAMINE 15 MG: 30 INJECTION, SOLUTION INTRAMUSCULAR; INTRAVENOUS at 11:08

## 2024-08-05 RX ADMIN — HYDROMORPHONE HYDROCHLORIDE 1 MG: 2 INJECTION INTRAMUSCULAR; INTRAVENOUS; SUBCUTANEOUS at 12:08

## 2024-08-05 RX ADMIN — SODIUM CHLORIDE: 0.9 INJECTION, SOLUTION INTRAVENOUS at 07:08

## 2024-08-05 RX ADMIN — ROCURONIUM BROMIDE 50 MG: 10 INJECTION, SOLUTION INTRAVENOUS at 07:08

## 2024-08-05 RX ADMIN — VASOPRESSIN 2 UNITS: 20 INJECTION INTRAVENOUS at 12:08

## 2024-08-05 RX ADMIN — HEPARIN SODIUM 5000 UNITS: 5000 INJECTION INTRAVENOUS; SUBCUTANEOUS at 09:08

## 2024-08-05 RX ADMIN — PHENYLEPHRINE HYDROCHLORIDE 100 MCG: 10 INJECTION INTRAVENOUS at 08:08

## 2024-08-05 RX ADMIN — GLYCOPYRROLATE 0.2 MG: 0.2 INJECTION, SOLUTION INTRAMUSCULAR; INTRAVITREAL at 07:08

## 2024-08-05 NOTE — OP NOTE
Date of surgery 08/05/2024    Preop diagnosis   1. L5-S1 isthmic spondylolisthesis with foraminal stenosis and radiculopathy   2. L4-5 bilateral foraminal stenosis with radiculopathy   3. L1-2 central canal stenosis with radiculopathy and neurogenic claudication    Postop diagnosis   Same    Surgery   1. Left L4-5 oblique interbody fusion, placement of interbody spacer, DePuy Conduit LLIF cage filled with allograft BMP and DBM  2. Right L5-S1 tubular circumferential fusion including interbody fusion and placement of interbody spacer, DePuy Conduit TLIF cage filled with allograft DBM, BMP and autograft harvested from the same incision, posterolateral arthrodesis using allograft morselized bone chips and autograft harvested from the same incision  3. L1-2 tubular bilateral laminectomy, medial facetectomy, foraminotomy  4. L4-S1 posterior segmental instrumentation using DePuy Viper Prime system  5. Registration of navigated instruments including intraoperative 3D imaging [facility] and [facility] station    Surgeon   Kael Flannery MD    Indication   02/12/24  This is a very pleasant 68 y.o. male, who is six months status post C3 through 6 laminectomy and posterior instrumented fusion for cervical spondylosis with myelopathy.  The patient also has lumbar stenosis with radiculopathy and neurogenic claudication.  He walks using a Rollator.  He has a tendency to lean forward.  Complaining of left more than right sciatica.  Has difficulty walking for long period of time.  Overall he is very satisfied with the outcome he had from his neck surgery.  He reports significant improvement in his upper extremity numbness and pain symptoms.  He recently had a transforaminal epidural steroid injection that gave him about 11 days of pain relief.      INTERIM HISTORY:     Still complains of significant feet pain, difficulty walking for long period of time. Relief of leg pain when sitting.     Procedure   The patient was intubated under  general anesthesia and positioned in lateral decubitus, left side up on a radiolucent table.  All pressure points were carefully padded.  The left flank and abdominal area was prepped and draped in a typical sterile fashion.  Using fluoroscopy we planned to anterolateral incision in line with the L4-5 and L5-S1 disc space.  Local anesthesia with 1% lidocaine with epi.  We started at L4-5.  The skin was incised and hemostasis was carried out.  The external oblique muscle fascia was divided sharply.  Blunt dissection through the muscle layer and down to the retroperitoneal space.  Quarter was dissected in front of the psoas muscle.  Using serial dilation we placed a 3 blade retractor in front of the L4-5 disc space and psoas muscle.  Under AP and lateral fluoroscopy we divided the ipsilateral and contralateral annulus, shaved anterior on the endplates and used serial trials.  After preparing the endplates we placed a final interbody spacer measuring 26 mm by 14 mm x 60 mm with 8° of lordosis filled with allograft BMP and DBM.  Good placement of the spacer was confirmed with fluoroscopy.  Good indirect decompression was achieved as well as good lordosis.  We then access the L5-S1 disc space using the same retroperitoneal technique.  There was a large anterior sacral vein that was ligatured with 2-0 silk and divided.  We then proceeded with the anterior diskectomy.  During retraction we noticed some venous bleeding coming from the divided proximal anterior sacral vein.  Hemostasis was carried out with fibular and compression.  We decided then to abort the anterior approach.  The muscular fascia was closed with interrupted 0 Vicryl.  The dermal layer was closed with inverted interrupted 3-0 Vicryl.  The skin was closed with staples.    The patient was then flipped prone on the Dillon table.  All pressure points were carefully padded.  The thoracolumbar area was prepped and draped in a typical sterile fashion.  We then  inserted 2 array pins in the left PSIS and placed our navigation array.  Patient was covered with a transparent drape and a 3D imaging was performed.  The reconstructed images were transferred to the [facility] station and then we confirmed accuracy with the pointer.  The navigated drill guide and pedicle screwdrivers were registered.  Using the pointer we planned bilateral paramedian incisions 4 posterior segmental instrumentation.  Local anesthesia with 1% lidocaine with epi.  The skin was incised and hemostasis was carried out.  The thoracolumbar fascia was divided sharply.  Blunt dissection through the muscle layers.  Using the navigated drill guide at 35 mm of depth we cannulated the pedicles of L4, L5 and S1 bilaterally, we then inserted K-wires into the trajectories.  Over the K-wires we used our navigated drill guide to place our pedicle screws.  6.0 x 50 mm pedicle screws were inserted in L4, L5 and S1 bilaterally.  Using navigation we then planned our trajectory for our right-sided posterior interbody fusion.  We used serial dilation and navigation and placed a tubular retractor measuring 22 mm x 8 cm over the medial right L5-S1 facet joint complex.  Under microscopic visualization we proceeded with subperiosteal dissection.  When then used a high-speed drill and a Lukens trap and drilled the inferior L5 as well as superior S1 facet joint down to the lateral L5-S1 disc space.  The bone was kept as autograft.  The annulus was divided and we then used serial Mio.  All disc material were shaved from the endplates and the endplates were prepared.  We packed the disc space with autograft harvested from the same incision, BMP and DBM.  We used a trial and placed a final interbody spacer, filled with BMP and DBM, measuring 26 mm x 9 mm by 13 mm of height with 12° of lordosis in the L5-S1 disc space.  Good positioning of the spacer was confirmed with fluoroscopy.  To achieve the posterolateral arthrodesis we  then decorticated the facet joints at L5-S1, the bone dust was left in place.  We then added morselized bone chips.  The tubular retractor was removed.    Using fluoroscopy we planned a right-sided paramedian incision at L1-2 measuring 20 mm in length.  Local anesthesia with 1% lidocaine with epi.  The skin was incised and hemostasis was carried out.  The thoracolumbar fascia was divided.  Using serial dilation we placed a tubular retractor measuring 18 mm x 5 cm at the junction of the right L1-2 lamina and medial facet.  Under microscopic visualization we proceeded with subperiosteal dissection.  Using the high-speed drill we drilled the inferior bilateral L1 laminectomy and superior bilateral L2 laminectomy.  The yellow ligament was resected in a piecemeal fashion to decompress the dura.  We drilled the medial facet bilaterally to decompress the lateral recesses and proceeded with bilateral foraminotomy.  Hemostasis with Gelfoam powder mixed with thrombin.  Irrigation and hemostasis.  We left a 10 [place] Juan drain in the epidural space and tunneled a drain superiorly through the skin, fixating the drain with 2-0 nylon.  The tubular retractor was removed.  The muscular fascia was closed with interrupted 0 Vicryl.  The dermal layer was closed with inverted interrupted 2-0 Vicryl.    We then measured and contoured titanium rods and reduced the rods over the screw tulips with caps.  All caps were torqued.  The tower tabs were snapped.  Good positioning of the instrumentation was confirmed with fluoroscopy.  Irrigation hemostasis.  The muscular fascia was closed with interrupted 0 Vicryl.  The dermal layer was closed with inverted interrupted 2-0 Vicryl.  The skin was closed with staples.  Blood loss was estimated at 400 cc.  No complication.

## 2024-08-05 NOTE — PLAN OF CARE
VN cued into pt's room for introduction with pt's permission.  VN role explained and informed pt that VN would be working with bedside nurse and the rest of the care team.  Fall risk and bed alarm protocol education provided.  Instructed pt to call for assistance and agreeable.  Allowed time for questions. NAD noted.  Will cont to be available as needed.      08/05/24 1715   Admission   Initial VN Admission Questions Complete   Communication Issues? None   Shift   Virtual Nurse - Patient Verbalized Approval Of Camera Use;VN Rounding   Safety/Activity   Patient Rounds call light in patient/parent reach;clutter free environment maintained;visualized patient   Safety Promotion/Fall Prevention Fall Risk reviewed with patient/family;instructed to call staff for mobility   Pain/Comfort/Sleep   Preferred Pain Scale number (Numeric Rating Pain Scale)   Pain Body Location - Orientation medial   Pain Body Location back   Pain Rating (0-10): Rest 7

## 2024-08-05 NOTE — H&P
NEUROSURGICAL PROGRESS NOTE     DATE OF SERVICE:  08/05/2024     ATTENDING PHYSICIAN:  Kael Flannery MD     SUBJECTIVE:  02/12/24  This is a very pleasant 68 y.o. male, who is six months status post C3 through 6 laminectomy and posterior instrumented fusion for cervical spondylosis with myelopathy.  The patient also has lumbar stenosis with radiculopathy and neurogenic claudication.  He walks using a Rollator.  He has a tendency to lean forward.  Complaining of left more than right sciatica.  Has difficulty walking for long period of time.  Overall he is very satisfied with the outcome he had from his neck surgery.  He reports significant improvement in his upper extremity numbness and pain symptoms.  He recently had a transforaminal epidural steroid injection that gave him about 11 days of pain relief.      INTERIM HISTORY:     Still complains of significant feet pain, difficulty walking for long period of time. Relief of leg pain when sitting.            PAST MEDICAL HISTORY:       Active Ambulatory Problems     Diagnosis Date Noted    Hypertension 06/09/2022    Hyperlipidemia 06/09/2022    Vitamin D deficiency 06/09/2022    Obesity 06/09/2022    Arthritis 06/09/2022    Prediabetes 06/10/2022    Primary insomnia 06/10/2022    Erectile dysfunction 06/10/2022    Pain in both wrists 06/12/2023    Dorsalgia 06/12/2023    Grief 06/12/2023    Cauda equina syndrome 07/12/2023    Cervical myelopathy 08/02/2023    Bradycardia 10/03/2023    AV block 10/05/2023           Resolved Ambulatory Problems     Diagnosis Date Noted    Wellness examination 06/10/2022      No Additional Past Medical History         PAST SURGICAL HISTORY:        Past Surgical History:   Procedure Laterality Date    ARTHROSCOPY OF KNEE   1974    CERVICAL LAMINECTOMY WITH SPINAL FUSION N/A 7/31/2023     Procedure: LAMINECTOMY, SPINE, CERVICAL, WITH FUSION;  Surgeon: Steven Clark MD;  Location: [facility] OR;  Service: Neurosurgery;  Laterality: N/A;   prone  chest rolls  neuromonitoring  DePuy  Randolph  C3-C6    COLONOSCOPY   07/16/2021    INSERTION OF PACEMAKER N/A 10/5/2023     Procedure: INSERTION, PACEMAKER;  Surgeon: Moiz Perez MD;  Location: [facility] CATH LAB;  Service: Cardiology;  Laterality: N/A;    TRANSFORAMINAL EPIDURAL INJECTION OF STEROID Left 11/30/2023     Procedure: left  L4/5 + L5/S1 TF DENNYS;  Surgeon: Julio César Mckinley MD;  Location: [facility] PAIN MGT;  Service: Pain Management;  Laterality: Left;    UMBILICAL HERNIA REPAIR             SOCIAL HISTORY:   Social History            Socioeconomic History    Marital status:        Spouse name: Barbara    Number of children: 2   Tobacco Use    Smoking status: Never       Passive exposure: Never    Smokeless tobacco: Never   Substance and Sexual Activity    Alcohol use: Not Currently       Alcohol/week: 1.0 standard drink of alcohol       Types: 1 Cans of beer per week    Drug use: Not Currently       Types: Marijuana       Comment: Gummies    Sexual activity: Not Currently       Partners: Female      Social Determinants of Health           Financial Resource Strain: Low Risk  (7/17/2023)     Overall Financial Resource Strain (CARDIA)      Difficulty of Paying Living Expenses: Not very hard   Food Insecurity: No Food Insecurity (7/17/2023)     Hunger Vital Sign      Worried About Running Out of Food in the Last Year: Never true      Ran Out of Food in the Last Year: Never true   Transportation Needs: No Transportation Needs (7/17/2023)     PRAPARE - Transportation      Lack of Transportation (Medical): No      Lack of Transportation (Non-Medical): No   Physical Activity: Insufficiently Active (12/12/2022)     Exercise Vital Sign      Days of Exercise per Week: 2 days      Minutes of Exercise per Session: 30 min   Stress: Stress Concern Present (7/17/2023)     [employer] - Occupational Stress Questionnaire      Feeling of Stress : Very much   Housing Stability: Low Risk   (7/17/2023)     Housing Stability Vital Sign      Unable to Pay for Housing in the Last Year: No      Number of Places Lived in the Last Year: 1      Unstable Housing in the Last Year: No         FAMILY HISTORY:         Family History   Problem Relation Name Age of Onset    Dementia Mother        Rheum arthritis Mother        Heart disease Father        Hypertension Father             CURRENTS MEDICATIONS:  Medications Ordered Prior to Encounter          Current Outpatient Medications on File Prior to Visit   Medication Sig Dispense Refill    acetaminophen (TYLENOL 8 HOUR ORAL) Take by mouth.        gabapentin (NEURONTIN) 300 MG capsule Take 1 capsule (300 mg total) by mouth 3 (three) times daily. (Patient taking differently: Take 400 mg by mouth 2 (two) times daily.) 90 capsule 0    hydroCHLOROthiazide (HYDRODIURIL) 25 MG tablet Take 1 tablet (25 mg total) by mouth once daily. 30 tablet 11    lisinopriL (PRINIVIL,ZESTRIL) 40 MG tablet Take 1 tablet (40 mg total) by mouth once daily. 90 tablet 3    metoprolol succinate (TOPROL-XL) 50 MG 24 hr tablet Take 1 tablet (50 mg total) by mouth once daily. 90 tablet 1    atorvastatin (LIPITOR) 10 MG tablet TAKE 1 TABLET BY MOUTH EVERY DAY IN THE EVENING (Patient not taking: Reported on 6/12/2024) 90 tablet 1    docusate sodium (COLACE) 100 MG capsule Take 100 mg by mouth 2 (two) times daily. (Patient not taking: Reported on 6/12/2024)          No current facility-administered medications on file prior to visit.            ALLERGIES:  Review of patient's allergies indicates:  No Known Allergies     REVIEW OF SYSTEMS:  Review of Systems   Constitutional:  Negative for diaphoresis, fever and weight loss.   Respiratory:  Negative for shortness of breath.    Cardiovascular:  Negative for chest pain.   Gastrointestinal:  Negative for blood in stool.   Genitourinary:  Negative for hematuria.   Endo/Heme/Allergies:  Does not bruise/bleed easily.   All other systems reviewed and are  negative.           OBJECTIVE:     PHYSICAL EXAMINATION:       Vitals:     06/12/24 1048   BP: 130/79   Pulse: 64         Physical Exam:  Vitals reviewed.     Constitutional: He appears well-developed and well-nourished.      Eyes: Pupils are equal, round, and reactive to light. Conjunctivae and EOM are normal.      Cardiovascular: Normal distal pulses and edema.      Abdominal: Soft.      Skin: Skin displays no rash on trunk and no rash on extremities. Skin displays no lesions on trunk and no lesions on extremities.      Psych/Behavior: He is alert. He is oriented to person, place, and time. He has a normal mood and affect.      Musculoskeletal:        Neck: Range of motion is full.      Neurological:        DTRs: Tricep reflexes are 2+ on the right side and 2+ on the left side. Bicep reflexes are 2+ on the right side and 2+ on the left side. Brachioradialis reflexes are 2+ on the right side and 2+ on the left side. Patellar reflexes are 0 on the right side and 0 on the left side. Achilles reflexes are 0 on the right side and 0 on the left side.         Back Exam      Muscle Strength   Right Quadriceps:  5/5   Left Quadriceps:  5/5   Right Hamstrings:  5/5   Left Hamstrings:  5/5                  SI joint:   Palpation at the right and left SI joints not painful  VALENTIN test is negative bilaterally  Gaenslen test is negative bilaterally  Thigh thrust test is negative bilaterally     Neurologic Exam      Mental Status   Oriented to person, place, and time.   Speech: speech is normal   Level of consciousness: alert     Cranial Nerves   Cranial nerves II through XII intact.      CN III, IV, VI   Pupils are equal, round, and reactive to light.  Extraocular motions are normal.      Motor Exam   Muscle bulk: normal  Overall muscle tone: normal     Strength   Right deltoid: 5/5  Left deltoid: 5/5  Right biceps: 5/5  Left biceps: 5/5  Right triceps: 5/5  Left triceps: 5/5  Right wrist flexion: 5/5  Left wrist flexion:  5/5  Right wrist extension: 5/5  Left wrist extension: 5/5  Right interossei: 5/5  Left interossei: 5/5  Right iliopsoas: 5/5  Left iliopsoas: 5/5  Right quadriceps: 5/5  Left quadriceps: 5/5  Right hamstrin/5  Left hamstrin/5  Right anterior tibial: 5/5  Left anterior tibial: 5/5  Right posterior tibial: 5/5  Left posterior tibial: 5/5  Right peroneal: 5/5  Left peroneal: 5/5  Right gastroc: 5/5  Left gastroc: 5/5     Sensory Exam   Light touch normal.   Pinprick normal.      Gait, Coordination, and Reflexes      Reflexes   Right brachioradialis: 2+  Left brachioradialis: 2+  Right biceps: 2+  Left biceps: 2+  Right triceps: 2+  Left triceps: 2+  Right patellar: 0  Left patellar: 0  Right achilles: 0  Left achilles: 0  Right plantar: normal  Left plantar: normal  Right Vidal: absent  Left Vidal: absent  Right ankle clonus: absent  Left ankle clonus: absent           DIAGNOSTIC DATA:  I personally interpreted the following imaging:   CT of the chest abdomen and pelvis of March 10/20/2024 shows ankylosis of L2-3, bilateral L5 pars defect, severe foraminal stenosis at L4-5 and L5-S1     Lumbar spine MRI 2023 shows severe spinal stenosis at L1-2, severe bilateral foraminal stenosis at L4-5 and L5-S1     ASSESSMENT:  This is a 68 y.o. male with      Problem List Items Addressed This Visit    None  Visit Diagnoses         Spinal stenosis of lumbar region with neurogenic claudication    -  Primary     Pars defect of lumbar spine         Foraminal stenosis of lumbar region         Lumbar radiculopathy         Spinal stenosis, lumbosacral region         Relevant Orders     MRI Lumbar Spine Without Contrast                   PLAN:  I explained the natural history of the disease and all treatment options. I recommended a L4-5 oblique, L5-S1 anterior interbody fusion, placement of interbody spacers, DePuy Conduit cages filled with allograft BMP and DBM, L1-2 laminectomy, medial facetectomy, foraminotomy,  L4-S1 posterior segmental instrumentation, using DePuy Viper Prime system.  The goals of the surgery is to improve the patient neurogenic claudication and radiculopathy symptoms.  Indirectly decompress the severe foraminal stenosis at L4-5 and L5-S1, provide long-term stability.       We have discussed the risks of surgery including death, coma, bleeding, infection, failure of surgery, CSF leak, nerve root injury, spinal cord injury, ureter injury, weakness, paralysis, peripheral neuropathy, malplaced hardware, migration of hardware, non-union, need for reoperation. Patient understands the risks and would like to proceed with surgery.     More than 50% of the time was spent on discussing conservative management treatments (medication, physical therapy exercises) and possible interventions (spinal injections and surgical procedures). Care coordination was discussed.     30 min       Kael Flannery MD  Cell:[phone]

## 2024-08-06 LAB
ANION GAP SERPL CALC-SCNC: 8 MMOL/L (ref 8–16)
BASOPHILS # BLD AUTO: 0.01 K/UL (ref 0–0.2)
BASOPHILS NFR BLD: 0.1 % (ref 0–1.9)
BUN SERPL-MCNC: 20 MG/DL (ref 8–23)
CALCIUM SERPL-MCNC: 8.7 MG/DL (ref 8.7–10.5)
CHLORIDE SERPL-SCNC: 105 MMOL/L (ref 95–110)
CO2 SERPL-SCNC: 22 MMOL/L (ref 23–29)
CREAT SERPL-MCNC: 0.8 MG/DL (ref 0.5–1.4)
DIFFERENTIAL METHOD BLD: ABNORMAL
EOSINOPHIL # BLD AUTO: 0 K/UL (ref 0–0.5)
EOSINOPHIL NFR BLD: 0 % (ref 0–8)
ERYTHROCYTE [DISTWIDTH] IN BLOOD BY AUTOMATED COUNT: 14 % (ref 11.5–14.5)
EST. GFR  (NO RACE VARIABLE): >60 ML/MIN/1.73 M^2
GLUCOSE SERPL-MCNC: 141 MG/DL (ref 70–110)
HCT VFR BLD AUTO: 35.2 % (ref 40–54)
HGB BLD-MCNC: 11.9 G/DL (ref 14–18)
IMM GRANULOCYTES # BLD AUTO: 0.02 K/UL (ref 0–0.04)
IMM GRANULOCYTES NFR BLD AUTO: 0.2 % (ref 0–0.5)
LYMPHOCYTES # BLD AUTO: 1.2 K/UL (ref 1–4.8)
LYMPHOCYTES NFR BLD: 11.9 % (ref 18–48)
MCH RBC QN AUTO: 31.4 PG (ref 27–31)
MCHC RBC AUTO-ENTMCNC: 33.8 G/DL (ref 32–36)
MCV RBC AUTO: 93 FL (ref 82–98)
MONOCYTES # BLD AUTO: 0.9 K/UL (ref 0.3–1)
MONOCYTES NFR BLD: 8.8 % (ref 4–15)
NEUTROPHILS # BLD AUTO: 7.7 K/UL (ref 1.8–7.7)
NEUTROPHILS NFR BLD: 79 % (ref 38–73)
NRBC BLD-RTO: 0 /100 WBC
PLATELET # BLD AUTO: 158 K/UL (ref 150–450)
PMV BLD AUTO: 12.5 FL (ref 9.2–12.9)
POTASSIUM SERPL-SCNC: 4.2 MMOL/L (ref 3.5–5.1)
RBC # BLD AUTO: 3.79 M/UL (ref 4.6–6.2)
SODIUM SERPL-SCNC: 135 MMOL/L (ref 136–145)
WBC # BLD AUTO: 9.68 K/UL (ref 3.9–12.7)

## 2024-08-06 PROCEDURE — 97165 OT EVAL LOW COMPLEX 30 MIN: CPT

## 2024-08-06 PROCEDURE — 36415 COLL VENOUS BLD VENIPUNCTURE: CPT | Performed by: NEUROLOGICAL SURGERY

## 2024-08-06 PROCEDURE — 97116 GAIT TRAINING THERAPY: CPT

## 2024-08-06 PROCEDURE — 99900035 HC TECH TIME PER 15 MIN (STAT)

## 2024-08-06 PROCEDURE — 11000001 HC ACUTE MED/SURG PRIVATE ROOM

## 2024-08-06 PROCEDURE — 97161 PT EVAL LOW COMPLEX 20 MIN: CPT

## 2024-08-06 PROCEDURE — 25000003 PHARM REV CODE 250: Performed by: NEUROLOGICAL SURGERY

## 2024-08-06 PROCEDURE — 80048 BASIC METABOLIC PNL TOTAL CA: CPT | Performed by: NEUROLOGICAL SURGERY

## 2024-08-06 PROCEDURE — 97535 SELF CARE MNGMENT TRAINING: CPT

## 2024-08-06 PROCEDURE — 94799 UNLISTED PULMONARY SVC/PX: CPT

## 2024-08-06 PROCEDURE — 85025 COMPLETE CBC W/AUTO DIFF WBC: CPT | Performed by: NEUROLOGICAL SURGERY

## 2024-08-06 PROCEDURE — 94761 N-INVAS EAR/PLS OXIMETRY MLT: CPT

## 2024-08-06 PROCEDURE — 63600175 PHARM REV CODE 636 W HCPCS: Performed by: NEUROLOGICAL SURGERY

## 2024-08-06 RX ADMIN — MUPIROCIN: 20 OINTMENT TOPICAL at 09:08

## 2024-08-06 RX ADMIN — HYDROMORPHONE HYDROCHLORIDE 2 MG: 2 INJECTION INTRAMUSCULAR; INTRAVENOUS; SUBCUTANEOUS at 01:08

## 2024-08-06 RX ADMIN — ACETAMINOPHEN 650 MG: 325 TABLET ORAL at 06:08

## 2024-08-06 RX ADMIN — METHOCARBAMOL TABLETS 750 MG: 750 TABLET, COATED ORAL at 01:08

## 2024-08-06 RX ADMIN — KETOROLAC TROMETHAMINE 15 MG: 30 INJECTION, SOLUTION INTRAMUSCULAR; INTRAVENOUS at 06:08

## 2024-08-06 RX ADMIN — OXYCODONE 10 MG: 5 TABLET ORAL at 12:08

## 2024-08-06 RX ADMIN — CEFAZOLIN 3 G: 2 INJECTION, POWDER, FOR SOLUTION INTRAMUSCULAR; INTRAVENOUS at 04:08

## 2024-08-06 RX ADMIN — ACETAMINOPHEN 650 MG: 325 TABLET ORAL at 05:08

## 2024-08-06 RX ADMIN — KETOROLAC TROMETHAMINE 15 MG: 30 INJECTION, SOLUTION INTRAMUSCULAR; INTRAVENOUS at 11:08

## 2024-08-06 RX ADMIN — HEPARIN SODIUM 5000 UNITS: 5000 INJECTION INTRAVENOUS; SUBCUTANEOUS at 09:08

## 2024-08-06 RX ADMIN — METHOCARBAMOL TABLETS 750 MG: 750 TABLET, COATED ORAL at 09:08

## 2024-08-06 RX ADMIN — TRAMADOL HYDROCHLORIDE 50 MG: 50 TABLET, COATED ORAL at 04:08

## 2024-08-06 RX ADMIN — GABAPENTIN 400 MG: 400 CAPSULE ORAL at 09:08

## 2024-08-06 RX ADMIN — SODIUM CHLORIDE, POTASSIUM CHLORIDE, SODIUM LACTATE AND CALCIUM CHLORIDE: 600; 310; 30; 20 INJECTION, SOLUTION INTRAVENOUS at 04:08

## 2024-08-06 RX ADMIN — SENNOSIDES AND DOCUSATE SODIUM 2 TABLET: 8.6; 5 TABLET ORAL at 09:08

## 2024-08-06 RX ADMIN — ACETAMINOPHEN 650 MG: 325 TABLET ORAL at 11:08

## 2024-08-06 RX ADMIN — METOPROLOL SUCCINATE 50 MG: 50 TABLET, EXTENDED RELEASE ORAL at 09:08

## 2024-08-06 RX ADMIN — OXYCODONE 10 MG: 5 TABLET ORAL at 09:08

## 2024-08-06 NOTE — PROGRESS NOTES
NEUROSURGICAL POST-OPERATIVE PROGRESS NOTE    DATE OF SERVICE:  08/06/2024      ATTENDING PHYSICIAN:  Kael Flannery MD    SUBJECTIVE:    INTERIM HISTORY:    This is a very pleasant 68 y.o. y.o. male, who is status postop day 1 L4-5 oblique interbody fusion and L5-S1 posterior interbody fusion, L1-2 laminectomy.  Patient is doing well.  Slept well.  After this noticed an improvement in his ability to move his ankles and feet this morning.  No complaints.  No abdominal pain.              OBJECTIVE:    PHYSICAL EXAMINATION:   Vitals:    08/06/24 0742   BP: (!) 150/69   Pulse: 83   Resp: 18   Temp: 97.6 °F (36.4 °C)       Physical Exam:  Vitals reviewed.    Constitutional: He appears well-developed and well-nourished.     Eyes: Pupils are equal, round, and reactive to light. Conjunctivae and EOM are normal.     Cardiovascular: Normal distal pulses and no edema.     Abdominal: Soft.     Skin: Skin displays no rash on trunk and no rash on extremities. Skin displays no lesions on trunk and no lesions on extremities.     Psych/Behavior: He is alert. He is oriented to person, place, and time. He has a normal mood and affect.     Musculoskeletal:        Neck: Range of motion is full.       Ortho Exam    Neurologic Exam     Mental Status   Oriented to person, place, and time.     Cranial Nerves     CN III, IV, VI   Pupils are equal, round, and reactive to light.  Extraocular motions are normal.     Motor Exam     Strength   Strength 5/5 throughout.         DIAGNOSTIC DATA:  Lab reviewed      ASSESSMENT:    This is a 68 y.o. male who is s/p day 1 L4-S1 fusion, L1 to laminectomy.  Doing well    Problem List Items Addressed This Visit          Neuro    Spinal stenosis of lumbar region with neurogenic claudication - Primary    Relevant Orders    Admit to Inpatient (Completed)    Vital signs    Turn cough deep breathe    Oral Care    Neurovascular checks:  RLE, LLE    Intake and output    Place sequential compression device     Chlorohexidine Bath    Pulse Oximetry Q4H    PT evaluate and treat    OT evaluate and treat    Transfer patient (Completed)    PT assistance with ambulation    Drain - full suction    Document drain output    Discontinue Michel Catheter (1 day after surgery)    LSO brace    Diet Adult Regular    CBC auto differential (Completed)    Basic metabolic panel (Completed)    X-Ray Lumbar Spine Ap And Lateral    Incentive spirometry    * (Principal) Foraminal stenosis of lumbar region    Relevant Orders    Vital signs    Turn cough deep breathe    Oral Care    Neurovascular checks:  RLE, LLE    Intake and output    Place sequential compression device    Chlorohexidine Bath    Pulse Oximetry Q4H    PT evaluate and treat    OT evaluate and treat    Transfer patient (Completed)    PT assistance with ambulation    Drain - full suction    Document drain output    Discontinue Michel Catheter (1 day after surgery)    LSO brace    Diet Adult Regular    CBC auto differential (Completed)    Basic metabolic panel (Completed)    X-Ray Lumbar Spine Ap And Lateral    Incentive spirometry       Orthopedic    Pars defect of lumbar spine    Relevant Orders    Vital signs    Turn cough deep breathe    Oral Care    Neurovascular checks:  RLE, LLE    Intake and output    Place sequential compression device    Chlorohexidine Bath    Pulse Oximetry Q4H    PT evaluate and treat    OT evaluate and treat    Transfer patient (Completed)    PT assistance with ambulation    Drain - full suction    Document drain output    Discontinue Michel Catheter (1 day after surgery)    LSO brace    Diet Adult Regular    CBC auto differential (Completed)    Basic metabolic panel (Completed)    X-Ray Lumbar Spine Ap And Lateral    Incentive spirometry       PLAN:    Okay to mobilize with PT with LSO brace  DC IV fluid        Kael Flannery MD  Pager: [phone]

## 2024-08-06 NOTE — PLAN OF CARE
AAOx4. RA. Medication administered per MAR. Scheduled tylenol and Toradol administered for pain. Incision dressing to back changed x2. IVF maintained. SCDs in place. CHG / marin care perfomred. KARLA drain emptied. Bed alarm active. Call light within reach. Pt encouraged to call for assistance.       Problem: Adult Inpatient Plan of Care  Goal: Patient-Specific Goal (Individualized)  Outcome: Progressing  Goal: Optimal Comfort and Wellbeing  Outcome: Progressing     Problem: Wound  Goal: Optimal Coping  Outcome: Progressing  Goal: Skin Health and Integrity  Outcome: Progressing     Problem: Spinal Surgery  Goal: Absence of Bleeding  Outcome: Progressing  Goal: Absence of Infection Signs and Symptoms  Outcome: Progressing  Goal: Optimal Pain Control and Function  Outcome: Progressing  Goal: Effective Urinary Elimination  Outcome: Progressing     Problem: Fall Injury Risk  Goal: Absence of Fall and Fall-Related Injury  Outcome: Progressing

## 2024-08-06 NOTE — PLAN OF CARE
Plan of care reviewed with patient. Patient verbalized understanding. Medicated per MAR. Instructed to use call light for assistance. Call light in reach. Bed in low position. Bed alarm set.

## 2024-08-06 NOTE — PLAN OF CARE
SOCIAL WORK DISCHARGE PLANNING ASSESSMENT    SW completed discharge planning with pt. Pt's son Victorino ([phone]) was at bedside during assessment. Pt was easily engaged and education on the role of  was provided. Pt reported he lives alone but has good support from family and friends and advised Victorino will provide assistance as needed after returning home. Pt stated he has the following DME: rollator and shower chair. Pt reported he is not current with  services. Pt drives himself to doctor appointments and Victorino will provide transportation home following discharge. No needs for community resources were reported. Pt was encouraged to call with any questions or concerns. Pt verbalized understanding.     Future Appointments   Date Time Provider Department Center   8/20/2024 10:00 AM [facility] XR-A LIMIT 350 LBS [facility] X[facility]   8/20/2024 11:00 AM Syeda Ceja PA-C [facility] NEUROSU [facility]   9/18/2024  9:30 AM [facility] XR-A LIMIT 350 LBS [facility]   9/18/2024 10:30 AM Syeda Ceja PA-C [facility] NEUROSU [facility]   11/6/2024  9:30 AM [facility] XR-A LIMIT 350 LBS [facility] XRAY OP [facility]   11/6/2024 10:30 AM Kael Flannery MD [facility] NEUROSU [facility]   12/19/2024 10:40 AM Mati Musa FNP [facility]        Patient Active Problem List   Diagnosis    Hypertension    Hyperlipidemia    Vitamin D deficiency    Obesity    Arthritis    Preop testing    Prediabetes    Primary insomnia    Erectile dysfunction    Pain in both wrists    Dorsalgia    Grief    Cauda equina syndrome    Cervical myelopathy    Bradycardia    AV block    Preop examination    Primary osteoarthritis, other specified site    Abnormal coagulation profile    Spinal stenosis of lumbar region with neurogenic claudication    Foraminal stenosis of lumbar region    Pars defect of lumbar spine      08/06/24 1118   Discharge Assessment   Assessment Type Discharge Planning  Assessment   Confirmed/corrected address, phone number and insurance Yes   Confirmed Demographics Correct on Facesheet   Source of Information patient   Communicated CHRISTY with patient/caregiver Date not available/Unable to determine   Reason For Admission foraminal stenosis of lumbar region   People in Home alone   Facility Arrived From: home   Do you expect to return to your current living situation? Yes   Do you have help at home or someone to help you manage your care at home? Yes   Who are your caregiver(s) and their phone number(s)? pt's son Victorino [phone]   Prior to hospitilization cognitive status: Alert/Oriented   Current cognitive status: Alert/Oriented   Walking or Climbing Stairs Difficulty yes   Walking or Climbing Stairs ambulation difficulty, requires equipment   Dressing/Bathing Difficulty yes   Dressing/Bathing bathing difficulty, requires equipment   Home Accessibility wheelchair accessible   Home Layout Able to live on 1st floor   Equipment Currently Used at Home rollator;shower chair   Readmission within 30 days? No   Patient currently being followed by outpatient case management? No   Do you currently have service(s) that help you manage your care at home? No   Do you take prescription medications? Yes   Do you have prescription coverage? Yes   Coverage PHN   Do you have any problems affording any of your prescribed medications? No   Is the patient taking medications as prescribed? yes   Who is going to help you get home at discharge? pt's son Victorino [phone]   How do you get to doctors appointments? car, drives self   Are you on dialysis? No   Do you take coumadin? No   Discharge Plan A Home with family   DME Needed Upon Discharge    (TBD)   Discharge Plan discussed with: Patient   Transition of Care Barriers None   Physical Activity   On average, how many days per week do you engage in moderate to strenuous exercise (like a brisk walk)? 5 days   On average, how many minutes do you engage  in exercise at this level? 60 min   Financial Resource Strain   How hard is it for you to pay for the very basics like food, housing, medical care, and heating? Not hard   Housing Stability   In the last 12 months, was there a time when you were not able to pay the mortgage or rent on time? N   At any time in the past 12 months, were you homeless or living in a shelter (including now)? N   Transportation Needs   Has the lack of transportation kept you from medical appointments, meetings, work or from getting things needed for daily living? No   Food Insecurity   Within the past 12 months, you worried that your food would run out before you got the money to buy more. Never true   Within the past 12 months, the food you bought just didn't last and you didn't have money to get more. Never true   Stress   Do you feel stress - tense, restless, nervous, or anxious, or unable to sleep at night because your mind is troubled all the time - these days? Not at all   Social Isolation   How often do you feel lonely or isolated from those around you?  Never   Alcohol Use   Q1: How often do you have a drink containing alcohol? Pt Unable   Q2: How many drinks containing alcohol do you have on a typical day when you are drinking? Pt Unable   Q3: How often do you have six or more drinks on one occasion? Pt Unable   Utilities   In the past 12 months has the electric, gas, oil, or water company threatened to shut off services in your home? No   Health Literacy   How often do you need to have someone help you when you read instructions, pamphlets, or other written material from your doctor or pharmacy? Never

## 2024-08-06 NOTE — PT/OT/SLP EVAL
Physical Therapy Evaluation and Treatment    Patient Name:  Corey Montano   MRN:  [id number]    Recommendations:     Discharge Recommendations: Low Intensity Therapy (with family assistance)   Discharge Equipment Recommendations: none   Barriers to discharge: None    Assessment:     Corey Montano is a 68 y.o. male admitted with a medical diagnosis of Foraminal stenosis of lumbar region.  He presents with the following impairments/functional limitations: weakness, impaired endurance, impaired self care skills, impaired functional mobility, gait instability, impaired balance, decreased lower extremity function, orthopedic precautions.    PT evaluation completed. Pt's PLOF: MOD I with use of rollator. Pt at this time requires CGA with use of RW. Therapy recommending low intensity therapy and family assistance. Therapy will continue to progress pt as able.     Rehab Prognosis: Good; patient would benefit from acute skilled PT services to address these deficits and reach maximum level of function.    Recent Surgery: Procedure(s) (LRB):  FUSION, SPINE, LUMBAR, ANTERIOR APPROACH (N/A)  FUSION, SPINE, WITH INSTRUMENTATION (N/A)  LAMINECTOMY, SPINE, MINIMALLY INVASIVE, POSTERIOR APPROACH (N/A) 1 Day Post-Op    Plan:     During this hospitalization, patient to be seen 5 x/week to address the identified rehab impairments via gait training, therapeutic activities, therapeutic exercises, neuromuscular re-education and progress toward the following goals:    Plan of Care Expires:  09/06/24    Subjective     Chief Complaint: none stated  Patient/Family Comments/goals: to return to PLOF  Pain/Comfort:  Pain Rating 1: 0/10  Pain Rating Post-Intervention 1: 0/10    Patients cultural, spiritual, Religion conflicts given the current situation: no    Living Environment:  Lives alone in 1 story home with ramp to enter with LHR. Tub/shower with TTB.   Prior to admission, patients level of function was MOD I with use of rollator.   Equipment used at home: rollator, walker, rolling, bath bench.  DME owned (not currently used): none.  Upon discharge, patient will have assistance from family.    Objective:     Communicated with Nurse prior to session.  Patient found HOB elevated with bed alarm (drain)  upon PT entry to room.    General Precautions: Standard, fall  Orthopedic Precautions:spinal precautions   Braces: LSO  Respiratory Status: Room air    Exams:  Cognitive Exam:  Patient is oriented to Person, Place, Time, and Situation  Sensation:    -       Intact  light/touch to BLE  RLE ROM: WFL  RLE Strength: WFL  LLE ROM: WFL except ankle DF due to weakness  LLE Strength: 3-/5 hip flexion, 3-/5 knee extension, 2+/5 ankle DF, 3-/5 ankle PF    Functional Mobility:  Bed Mobility:     Supine to Sit: contact guard assistance  Transfers:     Sit to Stand:  contact guard assistance with rolling walker  Gait: ~20ft with use of RW and CGA to ambulate to bedside chair.       AM-PAC 6 CLICK MOBILITY  Total Score:17       Treatment & Education:  Pt educated on role of PT.   Pt educated on use of LSO with all OOB mobility as well as spinal precautions; use of log roll technique with bed mobility.   Pt educated on proper hand placement/sequencing with use of RW.   Pt educated to call for staff assistance when ready to return to bed after sitting up in chair.   Pt understanding of education provided.    Patient left up in chair with all lines intact, call button in reach, chair alarm on, Nurse notified, and son present.    GOALS:   Multidisciplinary Problems       Physical Therapy Goals          Problem: Physical Therapy    Goal Priority Disciplines Outcome Goal Variances Interventions   Physical Therapy Goal     PT, PT/OT Progressing     Description: Goals to be met by: 24     Patient will increase functional independence with mobility by performin. Supine to sit with Modified [place]  2. Sit to supine with Modified [place]  3. Sit to  stand transfer with Modified [place] with use of RW.  4. Bed to chair transfer with Modified [place] using Rolling Walker  5. Gait  x 150 feet with Modified [place] using Rolling Walker.                          History:     History reviewed. No pertinent past medical history.    Past Surgical History:   Procedure Laterality Date    ARTHROSCOPY OF KNEE  1974    CERVICAL LAMINECTOMY WITH SPINAL FUSION N/A 7/31/2023    Procedure: LAMINECTOMY, SPINE, CERVICAL, WITH FUSION;  Surgeon: Steven Clark MD;  Location: [facility];  Service: Neurosurgery;  Laterality: N/A;  prone  chest rolls  neuromonitoring  DePuy  [place]  C3-C6    COLONOSCOPY  07/16/2021    FUSION OF LUMBAR SPINE BY ANTERIOR APPROACH N/A 8/5/2024    Procedure: FUSION, SPINE, LUMBAR, ANTERIOR APPROACH;  Surgeon: Kael Flannery MD;  Location: [facility];  Service: Neurosurgery;  Laterality: N/A;  Procedure:L4-5 OLiF, L5-S1 ALiF, Depuy conduit  Length of procedure: 2 hours  Anesthesia:General  Blood:Type and screen  Radiology:C-arm  Brace:LSO  Bed:Blende top  Position:Lateral right [place]  Equipment:Depuy-Mauricio, Othofix-Bone Stim    FUSION OF SPINE WITH INSTRUMENTATION N/A 8/5/2024    Procedure: FUSION, SPINE, WITH INSTRUMENTATION;  Surgeon: Kael Flannery MD;  Location: [facility];  Service: Neurosurgery;  Laterality: N/A;  Procedure:L1-2 laminectomy, L4-S1 posterior instrumentation  Length of procedure: 3 hours  LOS: 3 nights  Anesthesia:General  Blood:Type and screen  Radiology:Brain [facility] James, Ziehm  Microscope:Metrx  Brace:LSO  Bed:[place] [id number] Poster  Position:Prone  Equipment:    INSERTION OF PACEMAKER N/A 10/5/2023    Procedure: INSERTION, PACEMAKER;  Surgeon: Moiz Perez MD;  Location: [facility] CATH LAB;  Service: Cardiology;  Laterality: N/A;    LAMINECTOMY, SPINE, MINIMALLY INVASIVE, POSTERIOR APPROACH N/A 8/5/2024    Procedure: LAMINECTOMY, SPINE, MINIMALLY INVASIVE, POSTERIOR APPROACH;  Surgeon: Kael Flannery MD;  Location: [facility];   Service: Neurosurgery;  Laterality: N/A;    TRANSFORAMINAL EPIDURAL INJECTION OF STEROID Left 11/30/2023    Procedure: left  L4/5 + L5/S1 TF DENNYS;  Surgeon: Julio César Mckinley MD;  Location: [facility];  Service: Pain Management;  Laterality: Left;    UMBILICAL HERNIA REPAIR         Time Tracking:     PT Received On: 08/06/24  PT Start Time: 1055     PT Stop Time: 1118  PT Total Time (min): 23 min     Billable Minutes: Evaluation 10 and Gait Training 13      08/06/2024

## 2024-08-06 NOTE — PLAN OF CARE
Problem: Physical Therapy  Goal: Physical Therapy Goal  Description: Goals to be met by: 24     Patient will increase functional independence with mobility by performin. Supine to sit with Modified [place]  2. Sit to supine with Modified [place]  3. Sit to stand transfer with Modified [place] with use of RW.  4. Bed to chair transfer with Modified [place] using Rolling Walker  5. Gait  x 150 feet with Modified [place] using Rolling Walker.     Outcome: Progressing     PT evaluation completed. Pt's PLOF: MOD I with use of rollator. Pt at this time requires CGA with use of RW. Therapy recommending low intensity therapy and family assistance. Therapy will continue to progress pt as able.

## 2024-08-07 PROCEDURE — 11000001 HC ACUTE MED/SURG PRIVATE ROOM

## 2024-08-07 PROCEDURE — 97116 GAIT TRAINING THERAPY: CPT | Mod: CQ

## 2024-08-07 PROCEDURE — 97530 THERAPEUTIC ACTIVITIES: CPT | Mod: CQ

## 2024-08-07 PROCEDURE — 99900035 HC TECH TIME PER 15 MIN (STAT)

## 2024-08-07 PROCEDURE — 94761 N-INVAS EAR/PLS OXIMETRY MLT: CPT

## 2024-08-07 PROCEDURE — 97530 THERAPEUTIC ACTIVITIES: CPT | Mod: CO

## 2024-08-07 PROCEDURE — 97535 SELF CARE MNGMENT TRAINING: CPT | Mod: CO

## 2024-08-07 PROCEDURE — 94799 UNLISTED PULMONARY SVC/PX: CPT | Mod: XB

## 2024-08-07 PROCEDURE — 63600175 PHARM REV CODE 636 W HCPCS: Performed by: NEUROLOGICAL SURGERY

## 2024-08-07 PROCEDURE — 25000003 PHARM REV CODE 250: Performed by: NEUROLOGICAL SURGERY

## 2024-08-07 RX ORDER — POLYETHYLENE GLYCOL 3350 17 G/17G
17 POWDER, FOR SOLUTION ORAL DAILY
Status: DISCONTINUED | OUTPATIENT
Start: 2024-08-07 | End: 2024-08-09 | Stop reason: HOSPADM

## 2024-08-07 RX ADMIN — OXYCODONE 10 MG: 5 TABLET ORAL at 06:08

## 2024-08-07 RX ADMIN — METHOCARBAMOL TABLETS 750 MG: 750 TABLET, COATED ORAL at 09:08

## 2024-08-07 RX ADMIN — ACETAMINOPHEN 650 MG: 325 TABLET ORAL at 12:08

## 2024-08-07 RX ADMIN — HYDROMORPHONE HYDROCHLORIDE 2 MG: 2 INJECTION INTRAMUSCULAR; INTRAVENOUS; SUBCUTANEOUS at 12:08

## 2024-08-07 RX ADMIN — MUPIROCIN: 20 OINTMENT TOPICAL at 09:08

## 2024-08-07 RX ADMIN — OXYCODONE 10 MG: 5 TABLET ORAL at 09:08

## 2024-08-07 RX ADMIN — POLYETHYLENE GLYCOL (3350) 17 G: 17 POWDER, FOR SOLUTION ORAL at 10:08

## 2024-08-07 RX ADMIN — HYDROMORPHONE HYDROCHLORIDE 2 MG: 2 INJECTION INTRAMUSCULAR; INTRAVENOUS; SUBCUTANEOUS at 10:08

## 2024-08-07 RX ADMIN — GABAPENTIN 400 MG: 400 CAPSULE ORAL at 09:08

## 2024-08-07 RX ADMIN — KETOROLAC TROMETHAMINE 15 MG: 30 INJECTION, SOLUTION INTRAMUSCULAR; INTRAVENOUS at 06:08

## 2024-08-07 RX ADMIN — HEPARIN SODIUM 5000 UNITS: 5000 INJECTION INTRAVENOUS; SUBCUTANEOUS at 09:08

## 2024-08-07 RX ADMIN — KETOROLAC TROMETHAMINE 15 MG: 30 INJECTION, SOLUTION INTRAMUSCULAR; INTRAVENOUS at 01:08

## 2024-08-07 RX ADMIN — SENNOSIDES AND DOCUSATE SODIUM 2 TABLET: 8.6; 5 TABLET ORAL at 09:08

## 2024-08-07 RX ADMIN — ACETAMINOPHEN 650 MG: 325 TABLET ORAL at 01:08

## 2024-08-07 RX ADMIN — METHOCARBAMOL TABLETS 750 MG: 750 TABLET, COATED ORAL at 03:08

## 2024-08-07 RX ADMIN — ACETAMINOPHEN 650 MG: 325 TABLET ORAL at 10:08

## 2024-08-07 RX ADMIN — ACETAMINOPHEN 650 MG: 325 TABLET ORAL at 05:08

## 2024-08-07 RX ADMIN — METOPROLOL SUCCINATE 50 MG: 50 TABLET, EXTENDED RELEASE ORAL at 09:08

## 2024-08-07 RX ADMIN — KETOROLAC TROMETHAMINE 15 MG: 30 INJECTION, SOLUTION INTRAMUSCULAR; INTRAVENOUS at 12:08

## 2024-08-07 RX ADMIN — TRAMADOL HYDROCHLORIDE 50 MG: 50 TABLET, COATED ORAL at 01:08

## 2024-08-07 NOTE — PT/OT/SLP PROGRESS
"Occupational Therapy   Treatment    Name: Corey Montano  MRN: [id number]  Admitting Diagnosis:  Foraminal stenosis of lumbar region  2 Days Post-Op    Recommendations:     Discharge Recommendations: Low Intensity Therapy (with family assist)  Discharge Equipment Recommendations:  hip kit (pt may order through [facility])  Barriers to discharge:  Other (Comment) (Increased level of assistance)    Assessment:     Corey Montano is a 68 y.o. male with a medical diagnosis of Foraminal stenosis of lumbar region.  Performance deficits affecting function are weakness, impaired endurance, impaired self care skills, impaired functional mobility, gait instability, impaired balance, decreased lower extremity function, pain, decreased ROM, impaired coordination, impaired skin, edema, impaired muscle length.    Pt found in bed, agreeable to therapy.  Pt is progressing well towards goals. Continue OT services to address functional goals, progressing as able.      Rehab Prognosis:  Good; patient would benefit from acute skilled OT services to address these deficits and reach maximum level of function.       Plan:     Patient to be seen 5 x/week to address the above listed problems via self-care/home management, therapeutic activities, therapeutic exercises  Plan of Care Expires:    Plan of Care Reviewed with: patient, son    Subjective     Chief Complaint: "I had really bad pain after lunch but pain medicine helped."  Patient/Family Comments/goals: to go home   Pain/Comfort:  Pain Rating 1: 3/10  Location - Side 1: Bilateral  Location - Orientation 1: lower  Location 1: back  Pain Addressed 1: Reposition, Distraction, Pre-medicate for activity  Pain Rating Post-Intervention 1: 3/10    Objective:     Communicated with: nurse prior to session.  Patient found HOB elevated with bed alarm, KARLA drain, peripheral IV upon OT entry to room.    General Precautions: Standard, fall    Orthopedic Precautions:spinal precautions  Braces: " LSO  Respiratory Status: Room air     Occupational Performance:     Bed Mobility:    Patient completed Rolling/Turning to Right with stand by assistance and with side rail  Patient completed Scooting/Bridging with stand by assistance  Patient completed Supine to Sit with stand by assistance and with side rail HOB elevated, increased time and effort, vc's for effective log roll technique    Functional Mobility/Transfers:  Patient completed Sit <> Stand Transfer with contact guard assistance  with  rolling walker   Patient completed Bed <> Chair Transfer using Stand Pivot technique with contact guard assistance with rolling walker  Functional Mobility: Pt ambulated around bed to chair with CGA using RW. Pt dragging B feet at times.  He reports numbness is decreasing.      Activities of Daily Living:  Grooming: stand by assistance with CGA for standing balance at sink   Lower Body Dressing: minimum assistance use of AE: reacher, sock aid       [facility] 6 Click ADL: 21    Treatment & Education:  Educated pt and pts son on:   -home environment modifications to increase safety, reduce risk of fall as well as increase pt independence in home environment   -RW safety/mgmt during transfers and BADL's   -DME recommendations and use of- RW, TTB, ADL AE (hip kit)   -LB drsg technique using AE: reacher, sock aid. Pt unable to place BLEs in Figure 4 position.    -Spinal precautions and maintaining during BADL's and mobility.    Encouraged OOB in chair 1-2 hours and to call for assistance for back to bed. Pt verbalizes understanding.        Patient left up in chair with all lines intact, call button in reach, and PTA present    GOALS:   Multidisciplinary Problems       Occupational Therapy Goals          Problem: Occupational Therapy    Goal Priority Disciplines Outcome Interventions   Occupational Therapy Goal     OT, PT/OT Progressing    Description: Goals to be met by: 8/13/2024     Patient will increase functional independence  with ADLs by performing:    UE Dressing with Modified [place].  LE Dressing with Modified [place] and Assistive Devices as needed.  Grooming while standing at sink with Modified [place].  Toileting from toilet with Modified [place] for hygiene and clothing management.   Rolling to Bilateral with Modified [place].   Supine to sit with Modified [place].  Step transfer with Modified [place]  Toilet transfer to toilet with Modified [place].  Pt will demonstrate 100% return of spinal precautions.                       Time Tracking:     OT Date of Treatment: 08/07/24  OT Start Time: 1330  OT Stop Time: 1353  OT Total Time (min): 23 min    Billable Minutes:Self Care/Home Management 13  Therapeutic Activity 10               8/7/2024

## 2024-08-07 NOTE — PLAN OF CARE
Problem: Physical Therapy  Goal: Physical Therapy Goal  Description: Goals to be met by: 24     Patient will increase functional independence with mobility by performin. Supine to sit with Modified [place]  2. Sit to supine with Modified [place]  3. Sit to stand transfer with Modified [place] with use of RW.  4. Bed to chair transfer with Modified [place] using Rolling Walker  5. Gait  x 150 feet with Modified [place] using Rolling Walker.     Outcome: Progressing   Pt continues to work toward all goals. Able to perform ambulation training ~65-70 ft with RW, LSO donned, requiring CGA/SBA.

## 2024-08-07 NOTE — PLAN OF CARE
Plan of care reviewed with patient and son. Both verbalized understanding. Medicated per MAR. Instructed to use call light for assistance. Call light in reach.

## 2024-08-07 NOTE — PT/OT/SLP PROGRESS
"Physical Therapy Treatment    Patient Name:  Corey Montano   MRN:  [id number]    Recommendations:     Discharge Recommendations: Low Intensity Therapy (With family assist)  Discharge Equipment Recommendations: none  Barriers to discharge: None    Assessment:     Corey Montano is a 68 y.o. male admitted with a medical diagnosis of Foraminal stenosis of lumbar region.  He presents with the following impairments/functional limitations: weakness, impaired endurance, impaired functional mobility, impaired self care skills, gait instability, impaired balance, decreased lower extremity function, decreased safety awareness, pain, decreased ROM, impaired coordination, impaired skin, edema, orthopedic precautions. Pt able to perform ambulation training ~65-70 ft with RW, LSO donned, requiring CGA/SBA. Recommending low intensity therapy with family assist upon discharge.    Rehab Prognosis: Good; patient would benefit from acute skilled PT services to address these deficits and reach maximum level of function.    Recent Surgery: Procedure(s) (LRB):  FUSION, SPINE, LUMBAR, ANTERIOR APPROACH (N/A)  FUSION, SPINE, WITH INSTRUMENTATION (N/A)  LAMINECTOMY, SPINE, MINIMALLY INVASIVE, POSTERIOR APPROACH (N/A) 2 Days Post-Op    Plan:     During this hospitalization, patient to be seen 5 x/week to address the identified rehab impairments via gait training, therapeutic activities, therapeutic exercises, neuromuscular re-education and progress toward the following goals:    Plan of Care Expires:  09/06/24    Subjective     Chief Complaint: None Expressed  Patient/Family Comments/goals: "I feel better today but very tight around here". Pt pointing to hip flexor area.  Pain/Comfort:         Objective:     Communicated with nurse prior to session.  Patient found up in chair with KARLA drain, peripheral IV upon PT entry to room.     General Precautions: Standard, fall  Orthopedic Precautions: spinal precautions  Braces: LSO  Respiratory Status: " "Room air     Functional Mobility:  Transfers:     Sit to Stand:  stand by assistance and contact guard assistance with rolling walker  Gait: ~65-70 ft with RW, LSO donned, requiring CGA/SBA      AM-PAC 6 CLICK MOBILITY  Turning over in bed (including adjusting bedclothes, sheets and blankets)?: 3  Sitting down on and standing up from a chair with arms (e.g., wheelchair, bedside commode, etc.): 3  Moving from lying on back to sitting on the side of the bed?: 3  Moving to and from a bed to a chair (including a wheelchair)?: 3  Need to walk in hospital room?: 3  Climbing 3-5 steps with a railing?: 2  Basic Mobility Total Score: 17       Treatment & Education:  Pt in B/S chair finishing with OT. Able to perform transfer training ~8-10 ft around bed to sit at EOB. Instructed in and performed 1 x 10 hip/knee flexion(marches) within RW, LSO donned, requiring SBA/CGA. Pt instructed in gastroc/soleus self stretch seated at EOB. Received gentle manual stretching to gastroc/soleus complex 1 x 5 each BLE. Heel raises with pt being instructed to increase raise to bend at toe box while seated EOB to achieve stretch to plantar surface of foot secondary to pt expressing increased tightness in area. Also, performed 1 x 10 heel raises within RW boundaries requiring SBA. Ambulated ~65-70 ft with RW, LSO donned, requiring CGA/SBA. Pt demonstrates a steppage gait pattern LLE stating this is how he was walking secondary to pain prior to surgery. Pt stated, "It will take me a while to go back to normal walking".     Patient left  transferred to sit in transport chair as transport arrived to take pt to x-ray  with  left with transport ..    GOALS:   Multidisciplinary Problems       Physical Therapy Goals          Problem: Physical Therapy    Goal Priority Disciplines Outcome Goal Variances Interventions   Physical Therapy Goal     PT, PT/OT Progressing     Description: Goals to be met by: 9/6/24     Patient will increase functional " independence with mobility by performin. Supine to sit with Modified [place]  2. Sit to supine with Modified [place]  3. Sit to stand transfer with Modified [place] with use of RW.  4. Bed to chair transfer with Modified [place] using Rolling Walker  5. Gait  x 150 feet with Modified [place] using Rolling Walker.                          Time Tracking:     PT Received On: 24  PT Start Time: 1350     PT Stop Time: 1420  PT Total Time (min): 30 min     Billable Minutes: Gait Training 18 and Therapeutic Activity 12    Treatment Type: Treatment  PT/PTA: PTA     Number of PTA visits since last PT visit: 2024

## 2024-08-07 NOTE — PLAN OF CARE
Problem: Occupational Therapy  Goal: Occupational Therapy Goal  Outcome: Progressing     Problem: Occupational Therapy  Goal: Occupational Therapy Goal  Description: Goals to be met by: 8/13/2024     Patient will increase functional independence with ADLs by performing:    UE Dressing with Modified [place].  LE Dressing with Modified [place] and Assistive Devices as needed.  Grooming while standing at sink with Modified [place].  Toileting from toilet with Modified [place] for hygiene and clothing management.   Rolling to Bilateral with Modified [place].   Supine to sit with Modified [place].  Step transfer with Modified [place]  Toilet transfer to toilet with Modified [place].  Pt will demonstrate 100% return of spinal precautions.  8/6/2024 2254 by Aline Oneal, [facility]  Outcome: Progressing

## 2024-08-07 NOTE — PLAN OF CARE
Problem: Adult Inpatient Plan of Care  Goal: Plan of Care Review  Outcome: Progressing  Goal: Patient-Specific Goal (Individualized)  Outcome: Progressing  Goal: Optimal Comfort and Wellbeing  Outcome: Progressing  Goal: Readiness for Transition of Care  Outcome: Progressing     Problem: Infection  Goal: Absence of Infection Signs and Symptoms  Outcome: Progressing     Problem: Wound  Goal: Optimal Coping  Outcome: Progressing  Goal: Optimal Functional Ability  Outcome: Progressing     Problem: Spinal Surgery  Goal: Optimal Coping with Surgery  Outcome: Progressing  Goal: Absence of Bleeding  Outcome: Progressing  Goal: Effective Bowel Elimination  Outcome: Progressing  Goal: Fluid and Electrolyte Balance  Outcome: Progressing  Goal: Optimal Functional Ability  Outcome: Progressing

## 2024-08-07 NOTE — PLAN OF CARE
Problem: Occupational Therapy  Goal: Occupational Therapy Goal  Description: Goals to be met by: 8/13/2024     Patient will increase functional independence with ADLs by performing:    UE Dressing with Modified [place].  LE Dressing with Modified [place] and Assistive Devices as needed.  Grooming while standing at sink with Modified [place].  Toileting from toilet with Modified [place] for hygiene and clothing management.   Rolling to Bilateral with Modified [place].   Supine to sit with Modified [place].  Step transfer with Modified [place]  Toilet transfer to toilet with Modified [place].  Pt will demonstrate 100% return of spinal precautions.  Outcome: Trey Montano is a 68 y.o. male with a medical diagnosis of Foraminal stenosis of lumbar region.  Performance deficits affecting function are weakness, impaired endurance, impaired self care skills, impaired functional mobility, gait instability, impaired balance, decreased lower extremity function, pain, decreased ROM, impaired coordination, impaired skin, edema, impaired muscle length.    Pt found in bed, agreeable to therapy.  Pt is progressing well towards goals. Continue OT services to address functional goals, progressing as able.

## 2024-08-07 NOTE — PT/OT/SLP EVAL
"Occupational Therapy   Evaluation, tx    Name: Corey Montano  MRN: [id number]  Admitting Diagnosis: Foraminal stenosis of lumbar region  Recent Surgery: Procedure(s) (LRB):  FUSION, SPINE, LUMBAR, ANTERIOR APPROACH (N/A)  FUSION, SPINE, WITH INSTRUMENTATION (N/A)  LAMINECTOMY, SPINE, MINIMALLY INVASIVE, POSTERIOR APPROACH (N/A) 1 Day Post-Op    Recommendations:     Discharge Recommendations: Low Intensity Therapy (with family assist)  Discharge Equipment Recommendations:  hip kit (pt would like to purchase from [employer])  Barriers to discharge:       Assessment:     Corey Montano is a 68 y.o. male with a medical diagnosis of Foraminal stenosis of lumbar region.  He presents with s/p FUSION, SPINE, LUMBAR, ANTERIOR APPROACH  FUSION, SPINE, WITH INSTRUMENTATION  LAMINECTOMY, SPINE, MINIMALLY INVASIVE, POSTERIOR APPROACH   . Performance deficits affecting function: weakness, impaired self care skills, impaired functional mobility, gait instability, impaired balance, decreased lower extremity function, pain, impaired skin, orthopedic precautions.      Pt c/o only of "soreness" in his back near incision. He performed bed mobility CGA for log roll and supine>sit, donned LSO brace with supervision seated EOB. Brace was widened for better fit and support prior per pt's request. He dressed LE with SBA to  don socks with sock aid and doff with reacher seated unsupported EOB. He performed sit<>stand CGA with RW, ambulate 4ft and 8ft CGA with RW in room to sink and BS chair. Pt declined toileting. Pt left sitting UIC for meal. Nurse made aware. Pt was Indep-Mod I PTO. He would benefit from Low Intensity Therapy and a hip kit to increase Indep with LE ADLs in order to maintain spinal precautions. Pt is willing to pay out of pocket for hip kit. Continue with OT POC.     Rehab Prognosis: Good; patient would benefit from acute skilled OT services to address these deficits and reach maximum level of function.       Plan:     Patient to " be seen 5 x/week to address the above listed problems via self-care/home management, therapeutic activities, therapeutic exercises  Plan of Care Expires:    Plan of Care Reviewed with: patient    Subjective     Chief Complaint: soreness near back incision.  Patient/Family Comments/goals: pt agreeable. I am ready to get up, I feel pretty good.    Occupational Profile:  Living Environment: Pt. lives alone in a SSH with ramp, t/s combo with TTB.  Previous level of function: Mod I with rollator; drives.  Roles and Routines: active, father.  Equipment Used at Home: bath bench, walker, rolling, rollator (HH shower head)  Assistance upon Discharge: girlfriend    Pain/Comfort:  Pain Rating 1: 0/10  Location - Orientation 1: lower  Location 1: back  Pain Addressed 1: Reposition, Distraction, Pre-medicate for activity  Pain Rating Post-Intervention 1: 0/10    Patients cultural, spiritual, Tenriism conflicts given the current situation: no    Objective:     Communicated with: nurse prior to session.  Patient found HOB elevated with bed alarm, KARLA drain upon OT entry to room.    General Precautions: Standard, fall  Orthopedic Precautions: spinal precautions  Braces: LSO  Respiratory Status: Room air    Occupational Performance:    Bed Mobility:    Patient completed Rolling/Turning to Right with contact guard assistance, with side rail, and log roll  Patient completed Scooting/Bridging with contact guard assistance  Patient completed Supine to Sit with contact guard assistance, with side rail, and log roll    Functional Mobility/Transfers:  Patient completed Sit <> Stand Transfer with contact guard assistance  with  rolling walker   Patient completed Bed <> Chair Transfer using Step Transfer technique with contact guard assistance with rolling walker  Functional Mobility: ambulated 4ft and 6ft CGA RW to sink and bs chair on opposite room.     Activities of Daily Living:  Feeding:  independence .  Grooming: contact guard  assistance standing inside RW at sink, modified tandem stance for low back protection  Upper Body Dressing: stand by assistance donned LSO brace seated EOB  Lower Body Dressing: total assistance socks, limited bending and pending when attempted to lift leg to cross  Toileting: declined. Indep urinal use in bed per pt report.      Cognitive/Visual Perceptual:  Cognitive/Psychosocial Skills:     -       Oriented to: Person, Place, Time, and Situation   -       Follows Commands/attention:Follows multistep  commands  -       Communication: clear/fluent  -       Memory: No Deficits noted  -       Safety awareness/insight to disability: impaired   -       Mood/Affect/Coping skills/emotional control: Cooperative  Visual/Perceptual:      -Intact glasses    Physical Exam:  Balance:    -       sitting: fair plus  dynamic: fair  standing: fair dynamic: poor plus  Postural examination/scapula alignment:    -       No postural abnormalities identified  Skin integrity: LB incision an KARLA drain  Dominant hand:    -       right  Upper Extremity Range of Motion:     -       Right Upper Extremity: WFL  -       Left Upper Extremity: WFL  Upper Extremity Strength:    -       Right Upper Extremity: WFL  -       Left Upper Extremity: WFL   Strength:    -       Right Upper Extremity: WFL  -       Left Upper Extremity: WFL    AMPAC 6 Click ADL:  AMPAC Total Score: 20    Treatment & Education:  Purpose of OT and POC  Pt instructed in post op spinal precautions: no bending, no lifting more than 5lbs, no bending. He verbalized understanding. Ongoing.  Pt instructed in hip kit for LE ADLS: donned socks SBA with sock aid, doffed with reacher, simulated LH shoe horn use, reacher, LH sponge seated EOB. Ongoing.   Pt agreeable to purchase from [employer].   Pt instructed and performed log rolling for bed mobility as stated above. Ongoing.  Pt instructed/performed safe self care activities with RW and spinal precautions as stated above. Ongoing.  All  questions/concerns addressed within scope.   Pt educated in the Impt of OOB activity and sitting UIC 3 at least 3 times day for meals with staff assist. Pt agreeable.  Call don't fall and call bell use explained. Pt acknowledged.  Discussed findings with nurse.    Patient left up in chair with all lines intact, call button in reach, chair alarm on, and nurse notified    GOALS:   Multidisciplinary Problems       Occupational Therapy Goals          Problem: Occupational Therapy    Goal Priority Disciplines Outcome Interventions   Occupational Therapy Goal     OT, PT/OT Progressing    Description: Goals to be met by: 8/13/2024     Patient will increase functional independence with ADLs by performing:    UE Dressing with Modified [place].  LE Dressing with Modified [place] and Assistive Devices as needed.  Grooming while standing at sink with Modified [place].  Toileting from toilet with Modified [place] for hygiene and clothing management.   Rolling to Bilateral with Modified [place].   Supine to sit with Modified [place].  Step transfer with Modified [place]  Toilet transfer to toilet with Modified [place].  Pt will demonstrate 100% return of spinal precautions.                       History:     History reviewed. No pertinent past medical history.      Past Surgical History:   Procedure Laterality Date    ARTHROSCOPY OF KNEE  1974    CERVICAL LAMINECTOMY WITH SPINAL FUSION N/A 7/31/2023    Procedure: LAMINECTOMY, SPINE, CERVICAL, WITH FUSION;  Surgeon: Steven Clark MD;  Location: [facility] OR;  Service: Neurosurgery;  Laterality: N/A;  prone  chest rolls  neuromonitoring  DePuy  [place]  C3-C6    COLONOSCOPY  07/16/2021    FUSION OF LUMBAR SPINE BY ANTERIOR APPROACH N/A 8/5/2024    Procedure: FUSION, SPINE, LUMBAR, ANTERIOR APPROACH;  Surgeon: Kael Flannery MD;  Location: [facility] OR;  Service: Neurosurgery;  Laterality: N/A;  Procedure:L4-5 OLiF, L5-S1 ALiF, Depuy  conduit  Length of procedure: 2 hours  Anesthesia:General  Blood:Type and screen  Radiology:C-arm  Brace:LSO  Bed:Flat top  Position:Lateral right brianda  Equipment:Depuy-Mauricio, Othofix-Bone Stim    FUSION OF SPINE WITH INSTRUMENTATION N/A 8/5/2024    Procedure: FUSION, SPINE, WITH INSTRUMENTATION;  Surgeon: Kael Flannery MD;  Location: [facility] OR;  Service: Neurosurgery;  Laterality: N/A;  Procedure:L1-2 laminectomy, L4-S1 posterior instrumentation  Length of procedure: 3 hours  LOS: 3 nights  Anesthesia:General  Blood:Type and screen  Radiology:Brain Lab James, Ziehm  Microscope:Metrx  Brace:LSO  Bed:[place] [id number] Poster  Position:Prone  Equipment:    INSERTION OF PACEMAKER N/A 10/5/2023    Procedure: INSERTION, PACEMAKER;  Surgeon: Moiz Perez MD;  Location: [facility] CATH LAB;  Service: Cardiology;  Laterality: N/A;    LAMINECTOMY, SPINE, MINIMALLY INVASIVE, POSTERIOR APPROACH N/A 8/5/2024    Procedure: LAMINECTOMY, SPINE, MINIMALLY INVASIVE, POSTERIOR APPROACH;  Surgeon: Kael Flannery MD;  Location: [facility] OR;  Service: Neurosurgery;  Laterality: N/A;    TRANSFORAMINAL EPIDURAL INJECTION OF STEROID Left 11/30/2023    Procedure: left  L4/5 + L5/S1 TF DENNYS;  Surgeon: Julio César Mckinley MD;  Location: [facility];  Service: Pain Management;  Laterality: Left;    UMBILICAL HERNIA REPAIR         Time Tracking:     OT Date of Treatment: 08/06/24  OT Start Time: 1542  OT Stop Time: 1603  OT Total Time (min): 21 min    Billable Minutes:Evaluation 10  Self Care/Home Management 11  Total Time 21    8/6/2024

## 2024-08-08 PROCEDURE — 94761 N-INVAS EAR/PLS OXIMETRY MLT: CPT

## 2024-08-08 PROCEDURE — 63600175 PHARM REV CODE 636 W HCPCS: Performed by: NEUROLOGICAL SURGERY

## 2024-08-08 PROCEDURE — 99900035 HC TECH TIME PER 15 MIN (STAT)

## 2024-08-08 PROCEDURE — 97530 THERAPEUTIC ACTIVITIES: CPT | Mod: CQ

## 2024-08-08 PROCEDURE — 11000001 HC ACUTE MED/SURG PRIVATE ROOM

## 2024-08-08 PROCEDURE — 94799 UNLISTED PULMONARY SVC/PX: CPT | Mod: XB

## 2024-08-08 PROCEDURE — 25000003 PHARM REV CODE 250: Performed by: NEUROLOGICAL SURGERY

## 2024-08-08 PROCEDURE — 97530 THERAPEUTIC ACTIVITIES: CPT | Mod: CO

## 2024-08-08 PROCEDURE — 97116 GAIT TRAINING THERAPY: CPT | Mod: CQ

## 2024-08-08 RX ADMIN — BISACODYL 10 MG: 10 SUPPOSITORY RECTAL at 10:08

## 2024-08-08 RX ADMIN — ALUMINUM HYDROXIDE, MAGNESIUM HYDROXIDE, AND SIMETHICONE 30 ML: 1200; 120; 1200 SUSPENSION ORAL at 08:08

## 2024-08-08 RX ADMIN — METHOCARBAMOL TABLETS 750 MG: 750 TABLET, COATED ORAL at 10:08

## 2024-08-08 RX ADMIN — ACETAMINOPHEN 650 MG: 325 TABLET ORAL at 06:08

## 2024-08-08 RX ADMIN — HYDROMORPHONE HYDROCHLORIDE 2 MG: 2 INJECTION INTRAMUSCULAR; INTRAVENOUS; SUBCUTANEOUS at 06:08

## 2024-08-08 RX ADMIN — ACETAMINOPHEN 650 MG: 325 TABLET ORAL at 11:08

## 2024-08-08 RX ADMIN — POLYETHYLENE GLYCOL (3350) 17 G: 17 POWDER, FOR SOLUTION ORAL at 10:08

## 2024-08-08 RX ADMIN — METHOCARBAMOL TABLETS 750 MG: 750 TABLET, COATED ORAL at 03:08

## 2024-08-08 RX ADMIN — HEPARIN SODIUM 5000 UNITS: 5000 INJECTION INTRAVENOUS; SUBCUTANEOUS at 08:08

## 2024-08-08 RX ADMIN — HYDROMORPHONE HYDROCHLORIDE 2 MG: 2 INJECTION INTRAMUSCULAR; INTRAVENOUS; SUBCUTANEOUS at 11:08

## 2024-08-08 RX ADMIN — OXYCODONE 10 MG: 5 TABLET ORAL at 05:08

## 2024-08-08 RX ADMIN — METOPROLOL SUCCINATE 50 MG: 50 TABLET, EXTENDED RELEASE ORAL at 10:08

## 2024-08-08 RX ADMIN — GABAPENTIN 400 MG: 400 CAPSULE ORAL at 10:08

## 2024-08-08 RX ADMIN — OXYCODONE 10 MG: 5 TABLET ORAL at 03:08

## 2024-08-08 RX ADMIN — OXYCODONE 10 MG: 5 TABLET ORAL at 10:08

## 2024-08-08 RX ADMIN — GABAPENTIN 400 MG: 400 CAPSULE ORAL at 08:08

## 2024-08-08 RX ADMIN — TRAMADOL HYDROCHLORIDE 50 MG: 50 TABLET, COATED ORAL at 08:08

## 2024-08-08 RX ADMIN — HEPARIN SODIUM 5000 UNITS: 5000 INJECTION INTRAVENOUS; SUBCUTANEOUS at 10:08

## 2024-08-08 RX ADMIN — METHOCARBAMOL TABLETS 750 MG: 750 TABLET, COATED ORAL at 08:08

## 2024-08-08 RX ADMIN — ACETAMINOPHEN 650 MG: 325 TABLET ORAL at 05:08

## 2024-08-08 NOTE — PLAN OF CARE
Problem: Occupational Therapy  Goal: Occupational Therapy Goal  Description: Goals to be met by: 8/13/2024     Patient will increase functional independence with ADLs by performing:    UE Dressing with Modified [place].  LE Dressing with Modified [place] and Assistive Devices as needed.  Grooming while standing at sink with Modified [place].  Toileting from toilet with Modified [place] for hygiene and clothing management.   Rolling to Bilateral with Modified [place].   Supine to sit with Modified [place].  Step transfer with Modified [place]  Toilet transfer to toilet with Modified [place].  Pt will demonstrate 100% return of spinal precautions.  Outcome: Trey Montano is a 68 y.o. male with a medical diagnosis of Foraminal stenosis of lumbar region.  Performance deficits affecting function are weakness, impaired endurance, impaired self care skills, impaired functional mobility, gait instability, impaired balance, decreased lower extremity function, pain, decreased ROM, impaired coordination, impaired skin, edema, orthopedic precautions.    Pt found in bed, agreeable to minimal therapy 2/2 sleepy.  Pt is progressing well towards goals. Pain decreasing. Continue OT services to address functional goals, progressing as able.

## 2024-08-08 NOTE — NURSING
Received report from Willy, received the patient lying supine with HOB elevated, bed locked in low with alarm on and call light in reach, instructed to call for assistance.

## 2024-08-08 NOTE — PT/OT/SLP PROGRESS
"Occupational Therapy   Treatment    Name: Corey Montano  MRN: [id number]  Admitting Diagnosis:  Foraminal stenosis of lumbar region  3 Days Post-Op    Recommendations:     Discharge Recommendations: Low Intensity Therapy (assist from family)  Discharge Equipment Recommendations:  none  Barriers to discharge:  Other (Comment) (Increased level of assistance)    Assessment:     Corey Montano is a 68 y.o. male with a medical diagnosis of Foraminal stenosis of lumbar region.  Performance deficits affecting function are weakness, impaired endurance, impaired self care skills, impaired functional mobility, gait instability, impaired balance, decreased lower extremity function, pain, decreased ROM, impaired coordination, impaired skin, edema, orthopedic precautions.    Pt found in bed, agreeable to minimal therapy 2/2 sleepy.  Pt is progressing well towards goals. Pain decreasing. Continue OT services to address functional goals, progressing as able.      Rehab Prognosis:  Good; patient would benefit from acute skilled OT services to address these deficits and reach maximum level of function.       Plan:     Patient to be seen 5 x/week to address the above listed problems via self-care/home management, therapeutic activities, therapeutic exercises  Plan of Care Expires:    Plan of Care Reviewed with: patient    Subjective     Chief Complaint: "I think my feet are coming back to life."  Patient/Family Comments/goals: to go home   Pain/Comfort:  Pain Rating 1: 3/10  Location - Side 1: Bilateral  Location - Orientation 1: lower  Location 1: back  Pain Addressed 1: Distraction, Reposition  Pain Rating Post-Intervention 1: 3/10    Objective:     Communicated with: nurse prior to session.  Patient found HOB elevated with bed alarm, peripheral IV upon OT entry to room.    General Precautions: Standard, fall    Orthopedic Precautions:spinal precautions  Braces: LSO  Respiratory Status: Room air     Occupational Performance:     Bed " Mobility:    Patient completed Rolling/Turning to Right with modified independence  Patient completed Scooting/Bridging with modified independence to scoot seated to EOB  Patient completed Supine to Sit with supervision, increased time and effort, vcs for log roll tech  Patient completed Sit to Supine with minimum assistance for BLE assist using log roll tech    Functional Mobility/Transfers:  Patient completed Sit <> Stand Transfer with stand by assistance  with  rolling walker   Functional Mobility: Pt ambulated extended distances in white with SBA/CGA using RW in preparation for ambulatory level ADL/IADL.     Activities of Daily Living:  Upper Body Dressing: modified independence don/dof LSO brace       [facility] 6 Click ADL: 21    Treatment & Education:  Pt Mod I sitting EOB.    Patient left HOB elevated with all lines intact, call button in reach, and bed alarm on    GOALS:   Multidisciplinary Problems       Occupational Therapy Goals          Problem: Occupational Therapy    Goal Priority Disciplines Outcome Interventions   Occupational Therapy Goal     OT, PT/OT Progressing    Description: Goals to be met by: 8/13/2024     Patient will increase functional independence with ADLs by performing:    UE Dressing with Modified [place].  LE Dressing with Modified [place] and Assistive Devices as needed.  Grooming while standing at sink with Modified [place].  Toileting from toilet with Modified [place] for hygiene and clothing management.   Rolling to Bilateral with Modified [place].   Supine to sit with Modified [place].  Step transfer with Modified [place]  Toilet transfer to toilet with Modified [place].  Pt will demonstrate 100% return of spinal precautions.                       Time Tracking:     OT Date of Treatment: 08/08/24  OT Start Time: 1510  OT Stop Time: 1528  OT Total Time (min): 18 min    Billable Minutes:Therapeutic Activity 18               8/8/2024

## 2024-08-08 NOTE — PLAN OF CARE
SW met with pt to discuss d/c planning. Pt's son was at the bedside. PT/OT are recommending HH services upon discharge. SW discussed this with pt and pt's son. Pt and pt's son are agreeable for pt to discharge home with HH services. Pt and pt's son had no HH agency preference. [facility] No needs for community resources were reported. Pt and pt's son were encouraged to call with any questions or concerns. Pt and pt's son verbalized understanding.     9:50p.m. SW received call from Deisy with [facility]. Per Deisy, they have accept pt. SW informed Deisy of pt's anticipated discharge date.     SW will continue to follow.     Future Appointments   Date Time Provider Department Center   8/20/2024 10:00 AM [facility] XR-A LIMIT 350 LBS [facility] XRAY OP [place] [facility]   8/20/2024 11:00 AM Syeda Ceja PA-C [facility] NEUROSU [facility]   9/18/2024  9:30 AM [facility] XR-A LIMIT 350 LBS [facility] XRAY OP [facility]   9/18/2024 10:30 AM Syeda Ceja PA-C [facility] NEUROSU [facility]   11/6/2024  9:30 AM [facility] XR-A LIMIT 350 LBS [facility] XRAY OP [facility]   11/6/2024 10:30 AM Kael Flannery MD [facility] NEUROSU [place] [facility]   12/19/2024 10:40 AM Mati Musa FNP [facility]      Patient Active Problem List   Diagnosis    Hypertension    Hyperlipidemia    Vitamin D deficiency    Obesity    Arthritis    Preop testing    Prediabetes    Primary insomnia    Erectile dysfunction    Pain in both wrists    Dorsalgia    Grief    Cauda equina syndrome    Cervical myelopathy    Bradycardia    AV block    Preop examination    Primary osteoarthritis, other specified site    Abnormal coagulation profile    Spinal stenosis of lumbar region with neurogenic claudication    Foraminal stenosis of lumbar region    Pars defect of lumbar spine        08/08/24 0933   Discharge Reassessment   Assessment Type Discharge Planning Reassessment   Did the patient's condition or plan change since previous  assessment? Yes  (Patient will be discharging home with  services.)   Discharge Plan discussed with: Patient   Communicated CHRISTY with patient/caregiver Date not available/Unable to determine   Discharge Plan A Home Health   DME Needed Upon Discharge    (TBD)   Transition of Care Barriers None   Why the patient remains in the hospital Requires continued medical care   Post-Acute Status   Post-Acute Authorization Home Health   Home Health Status Referrals Sent   Coverage PHN   Discharge Delays None known at this time

## 2024-08-08 NOTE — PT/OT/SLP PROGRESS
Physical Therapy Treatment    Patient Name:  Corey Montano   MRN:  [id number]    Recommendations:     Discharge Recommendations: Low Intensity Therapy (with family assistance)  Discharge Equipment Recommendations: none  Barriers to discharge: None    Assessment:     Corey Montano is a 68 y.o. male admitted with a medical diagnosis of Foraminal stenosis of lumbar region.  He presents with the following impairments/functional limitations: weakness, impaired endurance, impaired self care skills, impaired functional mobility, gait instability, impaired balance, decreased upper extremity function, decreased lower extremity function, decreased safety awareness, pain, decreased ROM, impaired skin, edema, orthopedic precautions ;pt with good mobility today, very little c/o's pain, amb'd in hallway, though LE weakness noted, jesenia on LLE, dec DF and leg buckling a little at times, NO LOB noted.     Rehab Prognosis: Good; patient would benefit from acute skilled PT services to address these deficits and reach maximum level of function.    Recent Surgery: Procedure(s) (LRB):  FUSION, SPINE, LUMBAR, ANTERIOR APPROACH (N/A)  FUSION, SPINE, WITH INSTRUMENTATION (N/A)  LAMINECTOMY, SPINE, MINIMALLY INVASIVE, POSTERIOR APPROACH (N/A) 3 Days Post-Op    Plan:     During this hospitalization, patient to be seen 5 x/week to address the identified rehab impairments via gait training, therapeutic activities, therapeutic exercises, neuromuscular re-education and progress toward the following goals:    Plan of Care Expires:  09/06/24    Subjective     Chief Complaint: L LE weakness  Patient/Family Comments/goals: pt agreeable to session, pleasant.   Pain/Comfort:  Pain Rating 1: 2/10  Location - Orientation 1: lower  Location 1: back  Pain Addressed 1: Pre-medicate for activity, Reposition, Distraction  Pain Rating Post-Intervention 1: 2/10      Objective:     Communicated with nurse prior to session.  Patient found sitting edge of bed with  peripheral IV upon PT entry to room.     General Precautions: Standard, fall  Orthopedic Precautions: spinal precautions  Braces: LSO  Respiratory Status: Room air     Functional Mobility:  Transfers:     Sit to Stand:  stand by assistance with rolling walker  Gait: amb'd ~100' w/ RW and CGA, cueing to stay close to RW      AM-PAC 6 CLICK MOBILITY  Turning over in bed (including adjusting bedclothes, sheets and blankets)?: 3  Sitting down on and standing up from a chair with arms (e.g., wheelchair, bedside commode, etc.): 3  Moving from lying on back to sitting on the side of the bed?: 3  Moving to and from a bed to a chair (including a wheelchair)?: 3  Need to walk in hospital room?: 3  Climbing 3-5 steps with a railing?: 3  Basic Mobility Total Score: 18       Treatment & Education:  Pt req'd Raffy to adrienne LSO while sitting EOB, pt advised to use his regular RW at home (also owns a rollator) initially for safety.   Inst'd in seated LAQ's and LLE DF stretches.   Reviewed spinal prec., including no twisting, bending, lifting.       Patient left up in chair with all lines intact and call button in reach..    GOALS:   Multidisciplinary Problems       Physical Therapy Goals          Problem: Physical Therapy    Goal Priority Disciplines Outcome Goal Variances Interventions   Physical Therapy Goal     PT, PT/OT Progressing     Description: Goals to be met by: 24     Patient will increase functional independence with mobility by performin. Supine to sit with Modified [place]  2. Sit to supine with Modified [place]  3. Sit to stand transfer with Modified [place] with use of RW.  4. Bed to chair transfer with Modified [place] using Rolling Walker  5. Gait  x 150 feet with Modified [place] using Rolling Walker.                          Time Tracking:     PT Received On: 24  PT Start Time: 1214     PT Stop Time: 1238  PT Total Time (min): 24 min     Billable Minutes: Gait Training 14  and Therapeutic Activity 10    Treatment Type: Treatment  PT/PTA: PTA     Number of PTA visits since last PT visit: 2     08/08/2024

## 2024-08-08 NOTE — PROGRESS NOTES
NEUROSURGICAL POST-OPERATIVE PROGRESS NOTE    DATE OF SERVICE:  08/07/2024      ATTENDING PHYSICIAN:  Kael Flannery MD    SUBJECTIVE:    INTERIM HISTORY:    This is a very pleasant 68 y.o. y.o. male, who is status post-op day 2 L4-5 OLIF and L5-S1 TLIF. Doing well. Mobilizing well. Pain well controlled.               OBJECTIVE:    PHYSICAL EXAMINATION:   Vitals:    08/07/24 1937   BP: (!) 166/74   Pulse: 84   Resp: 19   Temp: 98 °F (36.7 °C)       Physical Exam:  Vitals reviewed.    Constitutional: He appears well-developed and well-nourished.     Eyes: Pupils are equal, round, and reactive to light. Conjunctivae and EOM are normal.     Cardiovascular: Normal distal pulses and no edema.     Abdominal: Soft.     Skin: Skin displays no rash on trunk and no rash on extremities. Skin displays no lesions on trunk and no lesions on extremities.     Psych/Behavior: He is alert. He is oriented to person, place, and time. He has a normal mood and affect.     Musculoskeletal:        Neck: Range of motion is full.       Ortho Exam    Neurologic Exam     Mental Status   Oriented to person, place, and time.     Cranial Nerves     CN III, IV, VI   Pupils are equal, round, and reactive to light.  Extraocular motions are normal.     Motor Exam     Strength   Strength 5/5 throughout.       Wound has healed.    DIAGNOSTIC DATA:    X-ray of the lumbar spine, AP and lateral views reveals the fusion hardware is intact. No loosening of screws or migration of hardware.    ASSESSMENT:    This is a 68 y.o. male who is s/p day 2 L4-5 OLIF and L5-S1 ALIF. Doing well.     Problem List Items Addressed This Visit          Neuro    Spinal stenosis of lumbar region with neurogenic claudication - Primary    Relevant Orders    Admit to Inpatient (Completed)    Vital signs    Turn cough deep breathe    Oral Care    Neurovascular checks:  RLE, LLE    Intake and output    Place sequential compression device    Chlorohexidine Bath    Pulse Oximetry  Q4H    PT evaluate and treat    OT evaluate and treat    Transfer patient (Completed)    PT assistance with ambulation    Drain - full suction    Document drain output    LSO brace    Diet Adult Regular    CBC auto differential (Completed)    Basic metabolic panel (Completed)    X-Ray Lumbar Spine Ap And Lateral (Completed)    Incentive spirometry    Full code    * (Principal) Foraminal stenosis of lumbar region    Relevant Orders    Vital signs    Turn cough deep breathe    Oral Care    Neurovascular checks:  RLE, LLE    Intake and output    Place sequential compression device    Chlorohexidine Bath    Pulse Oximetry Q4H    PT evaluate and treat    OT evaluate and treat    Transfer patient (Completed)    PT assistance with ambulation    Drain - full suction    Document drain output    LSO brace    Diet Adult Regular    CBC auto differential (Completed)    Basic metabolic panel (Completed)    X-Ray Lumbar Spine Ap And Lateral (Completed)    Incentive spirometry       Orthopedic    Pars defect of lumbar spine    Relevant Orders    Vital signs    Turn cough deep breathe    Oral Care    Neurovascular checks:  RLE, LLE    Intake and output    Place sequential compression device    Chlorohexidine Bath    Pulse Oximetry Q4H    PT evaluate and treat    OT evaluate and treat    Transfer patient (Completed)    PT assistance with ambulation    Drain - full suction    Document drain output    LSO brace    Diet Adult Regular    CBC auto differential (Completed)    Basic metabolic panel (Completed)    X-Ray Lumbar Spine Ap And Lateral (Completed)    Incentive spirometry       PLAN:    DC home in 48 h  Continue to mobilize        Kael Flannery MD  Pager: [phone]

## 2024-08-08 NOTE — PROGRESS NOTES
NEUROSURGICAL POST-OPERATIVE PROGRESS NOTE    DATE OF SERVICE:  08/08/2024      ATTENDING PHYSICIAN:  Kael Flannery MD    SUBJECTIVE:    INTERIM HISTORY:    This is a very pleasant 68 y.o. y.o. male, who is status postop day 3 L4-5 oblique interbody fusion, L5-S1 posterior interbody fusion, L1-2 laminectomy, doing well.  Mobilizing well.              OBJECTIVE:    PHYSICAL EXAMINATION:   Vitals:    08/08/24 1018   BP:    Pulse:    Resp: 20   Temp:          Neurosurgery Physical Exam    Ortho Exam    Neurologic Exam     Motor Exam     Strength   Strength 5/5 throughout.       Incision clean    DIAGNOSTIC DATA:    X-ray of the lumbar spine, AP and lateral views reveals the fusion hardware is intact. No loosening of screws or migration of hardware.    ASSESSMENT:    This is a 68 y.o. male who is s/p day 3 L4-S1 fusion.  Doing well.    Problem List Items Addressed This Visit          Neuro    Spinal stenosis of lumbar region with neurogenic claudication - Primary    Relevant Orders    Admit to Inpatient (Completed)    Vital signs    Turn cough deep breathe    Oral Care    Neurovascular checks:  RLE, LLE    Intake and output    Place sequential compression device    Chlorohexidine Bath    Pulse Oximetry Q4H    PT evaluate and treat    OT evaluate and treat    Transfer patient (Completed)    PT assistance with ambulation    Drain - full suction    Document drain output    LSO brace    Diet Adult Regular    CBC auto differential (Completed)    Basic metabolic panel (Completed)    X-Ray Lumbar Spine Ap And Lateral (Completed)    Incentive spirometry    Full code    * (Principal) Foraminal stenosis of lumbar region    Relevant Orders    Vital signs    Turn cough deep breathe    Oral Care    Neurovascular checks:  RLE, LLE    Intake and output    Place sequential compression device    Chlorohexidine Bath    Pulse Oximetry Q4H    PT evaluate and treat    OT evaluate and treat    Transfer patient (Completed)    PT assistance  with ambulation    Drain - full suction    Document drain output    LSO brace    Diet Adult Regular    CBC auto differential (Completed)    Basic metabolic panel (Completed)    X-Ray Lumbar Spine Ap And Lateral (Completed)    Incentive spirometry       Orthopedic    Pars defect of lumbar spine    Relevant Orders    Vital signs    Turn cough deep breathe    Oral Care    Neurovascular checks:  RLE, LLE    Intake and output    Place sequential compression device    Chlorohexidine Bath    Pulse Oximetry Q4H    PT evaluate and treat    OT evaluate and treat    Transfer patient (Completed)    PT assistance with ambulation    Drain - full suction    Document drain output    LSO brace    Diet Adult Regular    CBC auto differential (Completed)    Basic metabolic panel (Completed)    X-Ray Lumbar Spine Ap And Lateral (Completed)    Incentive spirometry       PLAN:    Discharge home tomorrow with home health PT/OT        Kael Flannery MD  Pager: [phone]

## 2024-08-08 NOTE — NURSING
Pt c/o incisional pain 7/10. PRN pain medication administered. Pt safety maintained. KARLA drain output monitored and recorded. Pt instructed to call w/any needs, verbalized understanding. Bed in lowest position, locked and bed alarms on. Call light w/in pt's reach.

## 2024-08-09 VITALS
TEMPERATURE: 98 F | BODY MASS INDEX: 35.77 KG/M2 | HEART RATE: 86 BPM | DIASTOLIC BLOOD PRESSURE: 79 MMHG | RESPIRATION RATE: 20 BRPM | WEIGHT: 278.69 LBS | SYSTOLIC BLOOD PRESSURE: 167 MMHG | HEIGHT: 74 IN | OXYGEN SATURATION: 99 %

## 2024-08-09 PROCEDURE — 63600175 PHARM REV CODE 636 W HCPCS: Performed by: NEUROLOGICAL SURGERY

## 2024-08-09 PROCEDURE — 94761 N-INVAS EAR/PLS OXIMETRY MLT: CPT

## 2024-08-09 PROCEDURE — 97530 THERAPEUTIC ACTIVITIES: CPT | Mod: CO

## 2024-08-09 PROCEDURE — 25000003 PHARM REV CODE 250: Performed by: NEUROLOGICAL SURGERY

## 2024-08-09 RX ORDER — METHOCARBAMOL 750 MG/1
750 TABLET, FILM COATED ORAL 3 TIMES DAILY PRN
Qty: 45 TABLET | Refills: 2 | Status: SHIPPED | OUTPATIENT
Start: 2024-08-09 | End: 2024-09-23

## 2024-08-09 RX ORDER — OXYCODONE AND ACETAMINOPHEN 5; 325 MG/1; MG/1
1-2 TABLET ORAL EVERY 8 HOURS PRN
Qty: 36 TABLET | Refills: 0 | Status: SHIPPED | OUTPATIENT
Start: 2024-08-09

## 2024-08-09 RX ADMIN — ACETAMINOPHEN 650 MG: 325 TABLET ORAL at 12:08

## 2024-08-09 RX ADMIN — METHOCARBAMOL TABLETS 750 MG: 750 TABLET, COATED ORAL at 03:08

## 2024-08-09 RX ADMIN — OXYCODONE 10 MG: 5 TABLET ORAL at 09:08

## 2024-08-09 RX ADMIN — BISACODYL 10 MG: 10 SUPPOSITORY RECTAL at 09:08

## 2024-08-09 RX ADMIN — METHOCARBAMOL TABLETS 750 MG: 750 TABLET, COATED ORAL at 09:08

## 2024-08-09 RX ADMIN — METOPROLOL SUCCINATE 50 MG: 50 TABLET, EXTENDED RELEASE ORAL at 09:08

## 2024-08-09 RX ADMIN — GABAPENTIN 400 MG: 400 CAPSULE ORAL at 09:08

## 2024-08-09 RX ADMIN — HEPARIN SODIUM 5000 UNITS: 5000 INJECTION INTRAVENOUS; SUBCUTANEOUS at 09:08

## 2024-08-09 RX ADMIN — ACETAMINOPHEN 650 MG: 325 TABLET ORAL at 05:08

## 2024-08-09 RX ADMIN — POLYETHYLENE GLYCOL (3350) 17 G: 17 POWDER, FOR SOLUTION ORAL at 09:08

## 2024-08-09 RX ADMIN — HYDROMORPHONE HYDROCHLORIDE 2 MG: 2 INJECTION INTRAMUSCULAR; INTRAVENOUS; SUBCUTANEOUS at 12:08

## 2024-08-09 NOTE — PLAN OF CARE
Problem: Occupational Therapy  Goal: Occupational Therapy Goal  Description: Goals to be met by: 8/13/2024     Patient will increase functional independence with ADLs by performing:    UE Dressing with Modified [place].  LE Dressing with Modified [place] and Assistive Devices as needed.  Grooming while standing at sink with Modified [place].  Toileting from toilet with Modified [place] for hygiene and clothing management.   Rolling to Bilateral with Modified [place].   Supine to sit with Modified [place].  Step transfer with Modified [place]  Toilet transfer to toilet with Modified [place].  Pt will demonstrate 100% return of spinal precautions.  Outcome: Trey Montano is a 68 y.o. male with a medical diagnosis of Foraminal stenosis of lumbar region.  Performance deficits affecting function are weakness, impaired endurance, impaired self care skills, impaired functional mobility, gait instability, impaired balance, decreased lower extremity function, pain, decreased ROM, impaired skin, edema, orthopedic precautions, impaired coordination.    Pt found in bed, agreeable to therapy. Pt is progressing well towards goals. Continue OT services to address functional goals, progressing as able.

## 2024-08-09 NOTE — PT/OT/SLP PROGRESS
Occupational Therapy   Treatment    Name: Corey Montano  MRN: [id number]  Admitting Diagnosis:  Foraminal stenosis of lumbar region  4 Days Post-Op    Recommendations:     Discharge Recommendations: Low Intensity Therapy (assist from family)  Discharge Equipment Recommendations:  none  Barriers to discharge:  Other (Comment) (increased level of assistance)    Assessment:     Corey Montano is a 68 y.o. male with a medical diagnosis of Foraminal stenosis of lumbar region.  Performance deficits affecting function are weakness, impaired endurance, impaired self care skills, impaired functional mobility, gait instability, impaired balance, decreased lower extremity function, pain, decreased ROM, impaired skin, edema, orthopedic precautions, impaired coordination.    Pt found in bed, agreeable to therapy. Pt is progressing well towards goals. Continue OT services to address functional goals, progressing as able.      Rehab Prognosis:  Good; patient would benefit from acute skilled OT services to address these deficits and reach maximum level of function.       Plan:     Patient to be seen 5 x/week to address the above listed problems via self-care/home management, therapeutic exercises  Plan of Care Expires:    Plan of Care Reviewed with: patient    Subjective     Chief Complaint: soreness   Patient/Family Comments/goals: to go home   Pain/Comfort:  Pain Rating 1: 3/10  Location - Side 1: Bilateral  Location - Orientation 1: lower  Location 1: back  Pain Addressed 1: Distraction, Reposition (ice pack)  Pain Rating Post-Intervention 1: 3/10    Objective:     Communicated with: nurse prior to session.  Patient found HOB elevated with bed alarm, peripheral IV upon OT entry to room.    General Precautions: Standard, fall    Orthopedic Precautions:spinal precautions  Braces: LSO  Respiratory Status: Room air     Occupational Performance:     Bed Mobility:    Patient completed Rolling/Turning to Right with modified independence    Patient completed Scooting/Bridging with modified independence scoot seated and in bed  Patient completed Supine to Sit with supervision, flat bed, no bed rail, verbal cueing for log roll tech   Patient completed Sit to Supine with minimum assistance for BLE assist     Functional Mobility/Transfers:  Patient completed Sit <> Stand Transfer with supervision  with  rolling walker   Functional Mobility: Pt ambulated using 3 pt gait with mostly SBA and CGA for turns w/ RW.  Pt then instructed on reciprocal gait pattern requiring CGA using RW.  Pt requires vcs for upright posture and RW proximity as he tends to steps to close to front of RW.  Pt picking up B feet better.      Activities of Daily Living:  Declined       [facility] 6 Click ADL: 21    Treatment & Education:  Pt sat EOB at Mod I level.    Applied ice pack to L anterior hip at end of session.   Pt declined to sit in bedside chair.     Patient left HOB elevated with all lines intact, call button in reach, bed alarm on, nurse notified, and son present    GOALS:   Multidisciplinary Problems       Occupational Therapy Goals          Problem: Occupational Therapy    Goal Priority Disciplines Outcome Interventions   Occupational Therapy Goal     OT, PT/OT Progressing    Description: Goals to be met by: 8/13/2024     Patient will increase functional independence with ADLs by performing:    UE Dressing with Modified [place].  LE Dressing with Modified [place] and Assistive Devices as needed.  Grooming while standing at sink with Modified [place].  Toileting from toilet with Modified [place] for hygiene and clothing management.   Rolling to Bilateral with Modified [place].   Supine to sit with Modified [place].  Step transfer with Modified [place]  Toilet transfer to toilet with Modified [place].  Pt will demonstrate 100% return of spinal precautions.                       Time Tracking:     OT Date of Treatment: 08/09/24  OT Start  Time: 1040  OT Stop Time: 1106  OT Total Time (min): 26 min    Billable Minutes:Therapeutic Activity 26 8/9/2024

## 2024-08-09 NOTE — DISCHARGE INSTRUCTIONS
Remain active with walking and other light activities daily.  No heavy lifting, no extreme bending or twisting.    Remove dressing on now. Ok to shower on today, but do not immerse wound in water  No driving for two weeks

## 2024-08-09 NOTE — PLAN OF CARE
08/09/24 1624   AVS Confirmation   Discharge instructions and AVS provided to and reviewed with patient and/or significant other. Yes         AVS printed and handed to patient by bedside nurse. VN reviewed discharge instructions with patient and son using teachback method.  Allowed time for questions, all questions answered.  Patient verbalized complete understanding of discharge instructions and voices no concerns. Discharge instructions complete. Bedside delivery complete. Wheelchair requested. Bedside nurse notified.

## 2024-08-09 NOTE — PROGRESS NOTES
NEUROSURGICAL POST-OPERATIVE PROGRESS NOTE    DATE OF SERVICE:  08/09/2024      ATTENDING PHYSICIAN:  Kael Flannery MD    SUBJECTIVE:    INTERIM HISTORY:    This is a very pleasant 68 y.o. y.o. male, who is status postop day 4 L4-5 oblique interbody fusion, L5-S1 posterior interbody fusion with L1-2 laminectomy.  Doing well.. Mobilizing well.  Pain is well-controlled.                OBJECTIVE:    PHYSICAL EXAMINATION:   Vitals:    08/09/24 1240   BP:    Pulse:    Resp: 20   Temp:        Physical Exam:  Vitals reviewed.    Constitutional: He appears well-developed and well-nourished.     Eyes: Pupils are equal, round, and reactive to light. Conjunctivae and EOM are normal.     Cardiovascular: Normal distal pulses and no edema.     Abdominal: Soft.     Skin: Skin displays no rash on trunk and no rash on extremities. Skin displays no lesions on trunk and no lesions on extremities.     Psych/Behavior: He is alert. He is oriented to person, place, and time. He has a normal mood and affect.     Musculoskeletal:        Neck: Range of motion is full.       Ortho Exam    Neurologic Exam     Mental Status   Oriented to person, place, and time.     Cranial Nerves     CN III, IV, VI   Pupils are equal, round, and reactive to light.  Extraocular motions are normal.     Motor Exam     Strength   Strength 5/5 throughout.       Incision clean    DIAGNOSTIC DATA:    X-ray of the lumbar spine, AP and lateral views reveals the fusion hardware is intact. No loosening of screws or migration of hardware.    ASSESSMENT:    This is a 68 y.o. male who is s/p day 4 L4-S1 fusion.  Doing well.    Problem List Items Addressed This Visit          Neuro    Spinal stenosis of lumbar region with neurogenic claudication - Primary    Relevant Orders    Admit to Inpatient (Completed)    Vital signs    Turn cough deep breathe    Oral Care    Neurovascular checks:  RLE, LLE    Intake and output    Place sequential compression device    Chlorohexidine  Bath    Pulse Oximetry Q4H    PT evaluate and treat    OT evaluate and treat    Transfer patient (Completed)    PT assistance with ambulation    Drain - full suction    Document drain output    LSO brace    Diet Adult Regular    CBC auto differential (Completed)    Basic metabolic panel (Completed)    X-Ray Lumbar Spine Ap And Lateral (Completed)    Incentive spirometry    Full code    Ambulatory referral/consult to Home Health    DISCHARGE PATIENT    * (Principal) Foraminal stenosis of lumbar region    Relevant Orders    Vital signs    Turn cough deep breathe    Oral Care    Neurovascular checks:  RLE, LLE    Intake and output    Place sequential compression device    Chlorohexidine Bath    Pulse Oximetry Q4H    PT evaluate and treat    OT evaluate and treat    Transfer patient (Completed)    PT assistance with ambulation    Drain - full suction    Document drain output    LSO brace    Diet Adult Regular    CBC auto differential (Completed)    Basic metabolic panel (Completed)    X-Ray Lumbar Spine Ap And Lateral (Completed)    Incentive spirometry       Orthopedic    Pars defect of lumbar spine    Relevant Orders    Vital signs    Turn cough deep breathe    Oral Care    Neurovascular checks:  RLE, LLE    Intake and output    Place sequential compression device    Chlorohexidine Bath    Pulse Oximetry Q4H    PT evaluate and treat    OT evaluate and treat    Transfer patient (Completed)    PT assistance with ambulation    Drain - full suction    Document drain output    LSO brace    Diet Adult Regular    CBC auto differential (Completed)    Basic metabolic panel (Completed)    X-Ray Lumbar Spine Ap And Lateral (Completed)    Incentive spirometry       PLAN:    Discharge home today with home health PT/OT  See discharge prescriptions/instructions  Follow up in 2 weeks for wound check  No driving for 2 weeks        Kael Flannery MD  Pager: [phone]

## 2024-08-09 NOTE — PLAN OF CARE
Discharge orders noted.     Set up completed for pt to have HH services with [facility].     Pt's son will provide transportation home following discharge.     Pt is cleared to go from CM standpoint.     Future Appointments   Date Time Provider Department Center   8/20/2024 10:00 AM [facility] ODC XR-A LIMIT 350 LBS [facility] XRAY OP [facility]   8/20/2024 11:00 AM Syeda Ceja PA-C [facility] NEUROSU [facility]   9/18/2024  9:30 AM [facility] ODC XR-A LIMIT 350 LBS [facility] XRAY OP [facility]   9/18/2024 10:30 AM Syeda Ceja PA-C [facility] NEUROSU [facility]   11/6/2024  9:30 AM [facility] ODC XR-A LIMIT 350 LBS [facility] XRAY OP [facility]   11/6/2024 10:30 AM Kael Flannery MD [facility] NEUROSU [facility]   12/19/2024 10:40 AM Mati Musa, KATHLEEN [facility]         08/09/24 1456   Final Note   Assessment Type Final Discharge Note   Anticipated Discharge Disposition Home-Health  ([facility])   Post-Acute Status   Post-Acute Authorization Home Health   Home Health Status Set-up Complete/Auth obtained   Discharge Delays None known at this time

## 2024-08-09 NOTE — NURSING
Received report from Sunni,  the patient sitting up in bed awake and alert, bed locked in low with alarm  on and call light in reach at present. Instructed to call for assistance.

## 2024-08-09 NOTE — PLAN OF CARE
Problem: Adult Inpatient Plan of Care  Goal: Optimal Comfort and Wellbeing  Outcome: Progressing    Problem: Spinal Surgery  Goal: Optimal Pain Control and Function  Outcome: Progressing        Problem: Wound  Goal: Optimal Pain Control and Function  Outcome: Progressing   Nightly and prn medications administered as ordered. Pain was managed.  All safety precautions secured, On going monitoring.

## 2024-08-10 PROCEDURE — G0180 MD CERTIFICATION HHA PATIENT: HCPCS | Mod: ,,, | Performed by: NEUROLOGICAL SURGERY

## 2024-08-13 NOTE — DISCHARGE SUMMARY
[facility] S[facility]  Neurosurgery  Discharge Summary      Patient Name: Corey Montano  MRN: [id number]  Admission Date: 8/5/2024  Hospital Length of Stay: 4 days  Discharge Date and Time: 8/9/2024  4:38 PM  Attending Physician: Amara att. providers found   Discharging Provider: Kael Flannery MD  Primary Care Provider: Mati Musa FNP     HPI: 02/12/24  This is a very pleasant 68 y.o. male, who is six months status post C3 through 6 laminectomy and posterior instrumented fusion for cervical spondylosis with myelopathy.  The patient also has lumbar stenosis with radiculopathy and neurogenic claudication.  He walks using a Rollator.  He has a tendency to lean forward.  Complaining of left more than right sciatica.  Has difficulty walking for long period of time.  Overall he is very satisfied with the outcome he had from his neck surgery.  He reports significant improvement in his upper extremity numbness and pain symptoms.  He recently had a transforaminal epidural steroid injection that gave him about 11 days of pain relief.      INTERIM HISTORY:     Still complains of significant feet pain, difficulty walking for long period of time. Relief of leg pain when sitting.     Procedure(s) (LRB):  1. Left L4-5 oblique interbody fusion, placement of interbody spacer, DePuy Conduit LLIF cage filled with allograft BMP and DBM  2. Right L5-S1 tubular circumferential fusion including interbody fusion and placement of interbody spacer, DePuy Conduit TLIF cage filled with allograft DBM, BMP and autograft harvested from the same incision, posterolateral arthrodesis using allograft morselized bone chips and autograft harvested from the same incision  3. L1-2 tubular bilateral laminectomy, medial facetectomy, foraminotomy  4. L4-S1 posterior segmental instrumentation using DePuy Viper Prime system  5. Registration of navigated instruments including intraoperative 3D imaging [facility]    Hospital Course: The  surgery was uncomplicated and postoperative course uneventful.  Patient was able to be discharged after voiding.      Consults: PT-OT    Significant Diagnostic Studies:  Postoperative lumbar x-ray showed good positioning of hardware    Pending Diagnostic Studies:       None          Final Active Diagnoses:    Diagnosis Date Noted POA    PRINCIPAL PROBLEM:  Foraminal stenosis of lumbar region [M48.061] 08/05/2024 Yes    Pars defect of lumbar spine [M43.06] 08/05/2024 Yes    Spinal stenosis of lumbar region with neurogenic claudication [M48.062] 06/19/2024 Yes      Problems Resolved During this Admission:      Discharged Condition: good    Disposition: Home-Health Care [facility]    Follow Up:    Patient Instructions:   No discharge procedures on file.  Medications:  Reconciled Home Medications:      Medication List        START taking these medications      methocarbamoL 750 MG Tab  Commonly known as: ROBAXIN  Take 1 tablet (750 mg total) by mouth 3 (three) times daily as needed (Muscle spasms).     oxyCODONE-acetaminophen 5-325 mg per tablet  Commonly known as: PERCOCET  Take 1-2 tablets by mouth every 8 (eight) hours as needed for Pain.            CHANGE how you take these medications      gabapentin 300 MG capsule  Commonly known as: NEURONTIN  Take 1 capsule (300 mg total) by mouth 3 (three) times daily.  What changed:   how much to take  when to take this            CONTINUE taking these medications      atorvastatin 10 MG tablet  Commonly known as: LIPITOR  TAKE 1 TABLET BY MOUTH EVERY DAY IN THE EVENING     docusate sodium 100 MG capsule  Commonly known as: COLACE  Take 100 mg by mouth 2 (two) times daily.     hydroCHLOROthiazide 25 MG tablet  Commonly known as: HYDRODIURIL  Take 1 tablet (25 mg total) by mouth once daily.     lisinopriL 40 MG tablet  Commonly known as: PRINIVIL,ZESTRIL  Take 1 tablet (40 mg total) by mouth once daily.     metoprolol succinate 50 MG 24 hr tablet  Commonly known as: TOPROL-XL  Take 1  tablet (50 mg total) by mouth once daily.     TYLENOL 8 HOUR ORAL  Take by mouth.              Kael Flannery MD  Neurosurgery  [facility]

## 2024-08-15 ENCOUNTER — TELEPHONE (OUTPATIENT)
Dept: NEUROSURGERY | Facility: CLINIC | Age: 69
End: 2024-08-15
Payer: MEDICARE

## 2024-08-15 NOTE — TELEPHONE ENCOUNTER
Returned pt's call, pt stated that he got a call from Kelly for his bone stim. He just wanted to make sure it was legit. I stated to pt that it is. Pt voiced understanding

## 2024-08-20 ENCOUNTER — HOSPITAL ENCOUNTER (OUTPATIENT)
Dept: RADIOLOGY | Facility: HOSPITAL | Age: 69
Discharge: HOME | End: 2024-08-20
Attending: NEUROLOGICAL SURGERY
Payer: MEDICARE

## 2024-08-20 ENCOUNTER — TELEPHONE (OUTPATIENT)
Dept: INTERNAL MEDICINE | Facility: CLINIC | Age: 69
End: 2024-08-20

## 2024-08-20 ENCOUNTER — OFFICE VISIT (OUTPATIENT)
Dept: NEUROSURGERY | Facility: CLINIC | Age: 69
End: 2024-08-20
Payer: MEDICARE

## 2024-08-20 VITALS
BODY MASS INDEX: 35.77 KG/M2 | HEART RATE: 77 BPM | WEIGHT: 278.69 LBS | HEIGHT: 74 IN | DIASTOLIC BLOOD PRESSURE: 59 MMHG | SYSTOLIC BLOOD PRESSURE: 103 MMHG

## 2024-08-20 DIAGNOSIS — Z98.1 S/P LUMBAR SPINAL FUSION: Primary | ICD-10-CM

## 2024-08-20 DIAGNOSIS — Z98.1 S/P LUMBAR FUSION: ICD-10-CM

## 2024-08-20 PROCEDURE — 1159F MED LIST DOCD IN RCRD: CPT | Mod: CPTII,S$GLB,, | Performed by: PHYSICIAN ASSISTANT

## 2024-08-20 PROCEDURE — 99999 PR PBB SHADOW E&M-EST. PATIENT-LVL IV: CPT | Mod: PBBFAC,,, | Performed by: PHYSICIAN ASSISTANT

## 2024-08-20 PROCEDURE — 4010F ACE/ARB THERAPY RXD/TAKEN: CPT | Mod: CPTII,S$GLB,, | Performed by: PHYSICIAN ASSISTANT

## 2024-08-20 PROCEDURE — 3078F DIAST BP <80 MM HG: CPT | Mod: CPTII,S$GLB,, | Performed by: PHYSICIAN ASSISTANT

## 2024-08-20 PROCEDURE — 99024 POSTOP FOLLOW-UP VISIT: CPT | Mod: S$GLB,,, | Performed by: PHYSICIAN ASSISTANT

## 2024-08-20 PROCEDURE — 3074F SYST BP LT 130 MM HG: CPT | Mod: CPTII,S$GLB,, | Performed by: PHYSICIAN ASSISTANT

## 2024-08-20 PROCEDURE — 1126F AMNT PAIN NOTED NONE PRSNT: CPT | Mod: CPTII,S$GLB,, | Performed by: PHYSICIAN ASSISTANT

## 2024-08-20 PROCEDURE — 3066F NEPHROPATHY DOC TX: CPT | Mod: CPTII,S$GLB,, | Performed by: PHYSICIAN ASSISTANT

## 2024-08-20 PROCEDURE — 3061F NEG MICROALBUMINURIA REV: CPT | Mod: CPTII,S$GLB,, | Performed by: PHYSICIAN ASSISTANT

## 2024-08-20 PROCEDURE — 72100 X-RAY EXAM L-S SPINE 2/3 VWS: CPT | Mod: TC,FY

## 2024-08-20 PROCEDURE — 3288F FALL RISK ASSESSMENT DOCD: CPT | Mod: CPTII,S$GLB,, | Performed by: PHYSICIAN ASSISTANT

## 2024-08-20 PROCEDURE — 1101F PT FALLS ASSESS-DOCD LE1/YR: CPT | Mod: CPTII,S$GLB,, | Performed by: PHYSICIAN ASSISTANT

## 2024-08-20 PROCEDURE — 3044F HG A1C LEVEL LT 7.0%: CPT | Mod: CPTII,S$GLB,, | Performed by: PHYSICIAN ASSISTANT

## 2024-08-20 PROCEDURE — 72100 X-RAY EXAM L-S SPINE 2/3 VWS: CPT | Mod: 26,,, | Performed by: INTERNAL MEDICINE

## 2024-08-20 PROCEDURE — 1160F RVW MEDS BY RX/DR IN RCRD: CPT | Mod: CPTII,S$GLB,, | Performed by: PHYSICIAN ASSISTANT

## 2024-08-20 RX ORDER — OXYCODONE AND ACETAMINOPHEN 5; 325 MG/1; MG/1
1-2 TABLET ORAL EVERY 8 HOURS PRN
Qty: 36 TABLET | Refills: 0 | Status: SHIPPED | OUTPATIENT
Start: 2024-08-20

## 2024-08-20 NOTE — PROGRESS NOTES
"  Postoperative Wound Check:    Date of surgery 08/05/2024     Preop diagnosis   1. L5-S1 isthmic spondylolisthesis with foraminal stenosis and radiculopathy   2. L4-5 bilateral foraminal stenosis with radiculopathy   3. L1-2 central canal stenosis with radiculopathy and neurogenic claudication     Postop diagnosis   Same     Surgery   1. Left L4-5 oblique interbody fusion, placement of interbody spacer, DePuy Conduit LLIF cage filled with allograft BMP and DBM  2. Right L5-S1 tubular circumferential fusion including interbody fusion and placement of interbody spacer, DePuy Conduit TLIF cage filled with allograft DBM, BMP and autograft harvested from the same incision, posterolateral arthrodesis using allograft morselized bone chips and autograft harvested from the same incision  3. L1-2 tubular bilateral laminectomy, medial facetectomy, foraminotomy  4. L4-S1 posterior segmental instrumentation using DePuy Viper Prime system  5. Registration of navigated instruments including intraoperative 3D imaging [facility] and [facility] station     Surgeon   Kael Flannery MD          HPI:    Patient states that overall he is doing well.  He states that the numbness in his feet is gone.  He denies any leg pain or paresthesias.  He has been wearing his brace.    He states that just now during the staple removal he got lightheaded and started to fall and fell forward to the ground onto his hand.  It was not a hard fall.  He denies hitting his head neck or back.  He still feels a little bit dizzy and lightheaded.  States he did not eat this morning.  No LOC.    Patient denies any problems with the incisions.  No redness, swelling, or drainage, and no fever, chills, or sweats.      Physical Exam:    Vitals:    08/20/24 1101 08/20/24 1147   BP: 125/74 (!) 103/59   Pulse: 77    Weight: 126.4 kg (278 lb 10.6 oz)    Height: 6' 2" (1.88 m)    PainSc: 0-No pain          Incision:  Wound edges are approximated.  No redness, swelling, or " drainage.      Diagnosis:     1. S/P lumbar spinal fusion              Plan:       Orders Placed This Encounter    oxyCODONE-acetaminophen (PERCOCET) 5-325 mg per tablet         -Medical Assistant and I removed the staples without any complications.  Chloraprep applied.  -Xray shows good hardware placement  -Percocet refilled  -Continue walking  -Continue back brace          The patient will follow up with me in 4 weeks for the 6 week po fu with xrays beforehand.    KYAW Jaime, PA-C  Neurosurgery  [facility]

## 2024-09-05 ENCOUNTER — EXTERNAL HOME HEALTH (OUTPATIENT)
Dept: HOME HEALTH SERVICES | Facility: HOSPITAL | Age: 69
End: 2024-09-05
Payer: MEDICARE

## 2024-09-18 ENCOUNTER — HOSPITAL ENCOUNTER (OUTPATIENT)
Dept: RADIOLOGY | Facility: HOSPITAL | Age: 69
Discharge: HOME | End: 2024-09-18
Attending: NEUROLOGICAL SURGERY
Payer: MEDICARE

## 2024-09-18 ENCOUNTER — OFFICE VISIT (OUTPATIENT)
Dept: NEUROSURGERY | Facility: CLINIC | Age: 69
End: 2024-09-18
Payer: MEDICARE

## 2024-09-18 VITALS
BODY MASS INDEX: 35.77 KG/M2 | DIASTOLIC BLOOD PRESSURE: 76 MMHG | HEART RATE: 79 BPM | WEIGHT: 278.69 LBS | SYSTOLIC BLOOD PRESSURE: 113 MMHG | HEIGHT: 74 IN

## 2024-09-18 DIAGNOSIS — Z98.1 S/P LUMBAR FUSION: ICD-10-CM

## 2024-09-18 DIAGNOSIS — Z98.1 S/P LUMBAR SPINAL FUSION: Primary | ICD-10-CM

## 2024-09-18 PROCEDURE — 3078F DIAST BP <80 MM HG: CPT | Mod: CPTII,S$GLB,, | Performed by: PHYSICIAN ASSISTANT

## 2024-09-18 PROCEDURE — 3074F SYST BP LT 130 MM HG: CPT | Mod: CPTII,S$GLB,, | Performed by: PHYSICIAN ASSISTANT

## 2024-09-18 PROCEDURE — 3061F NEG MICROALBUMINURIA REV: CPT | Mod: CPTII,S$GLB,, | Performed by: PHYSICIAN ASSISTANT

## 2024-09-18 PROCEDURE — 3044F HG A1C LEVEL LT 7.0%: CPT | Mod: CPTII,S$GLB,, | Performed by: PHYSICIAN ASSISTANT

## 2024-09-18 PROCEDURE — 4010F ACE/ARB THERAPY RXD/TAKEN: CPT | Mod: CPTII,S$GLB,, | Performed by: PHYSICIAN ASSISTANT

## 2024-09-18 PROCEDURE — 72100 X-RAY EXAM L-S SPINE 2/3 VWS: CPT | Mod: TC,FY

## 2024-09-18 PROCEDURE — 99999 PR PBB SHADOW E&M-EST. PATIENT-LVL III: CPT | Mod: PBBFAC,,, | Performed by: PHYSICIAN ASSISTANT

## 2024-09-18 PROCEDURE — 72100 X-RAY EXAM L-S SPINE 2/3 VWS: CPT | Mod: 26,,, | Performed by: RADIOLOGY

## 2024-09-18 PROCEDURE — 3066F NEPHROPATHY DOC TX: CPT | Mod: CPTII,S$GLB,, | Performed by: PHYSICIAN ASSISTANT

## 2024-09-18 PROCEDURE — 99024 POSTOP FOLLOW-UP VISIT: CPT | Mod: S$GLB,,, | Performed by: PHYSICIAN ASSISTANT

## 2024-09-18 PROCEDURE — 1126F AMNT PAIN NOTED NONE PRSNT: CPT | Mod: CPTII,S$GLB,, | Performed by: PHYSICIAN ASSISTANT

## 2024-09-18 PROCEDURE — 1160F RVW MEDS BY RX/DR IN RCRD: CPT | Mod: CPTII,S$GLB,, | Performed by: PHYSICIAN ASSISTANT

## 2024-09-18 PROCEDURE — 1159F MED LIST DOCD IN RCRD: CPT | Mod: CPTII,S$GLB,, | Performed by: PHYSICIAN ASSISTANT

## 2024-09-18 PROCEDURE — 1101F PT FALLS ASSESS-DOCD LE1/YR: CPT | Mod: CPTII,S$GLB,, | Performed by: PHYSICIAN ASSISTANT

## 2024-09-18 PROCEDURE — 3288F FALL RISK ASSESSMENT DOCD: CPT | Mod: CPTII,S$GLB,, | Performed by: PHYSICIAN ASSISTANT

## 2024-09-18 RX ORDER — GABAPENTIN 600 MG/1
600 TABLET ORAL 2 TIMES DAILY
Qty: 180 TABLET | Refills: 0 | Status: SHIPPED | OUTPATIENT
Start: 2024-09-18 | End: 2025-09-18

## 2024-09-18 NOTE — PROGRESS NOTES
Subjective:     Patient ID:  Corey Montano is a 68 y.o. male.    Prudencio    Chief Complaint: 6 week po fu    Date of surgery 08/05/2024     Preop diagnosis   1. L5-S1 isthmic spondylolisthesis with foraminal stenosis and radiculopathy   2. L4-5 bilateral foraminal stenosis with radiculopathy   3. L1-2 central canal stenosis with radiculopathy and neurogenic claudication     Postop diagnosis   Same     Surgery   1. Left L4-5 oblique interbody fusion, placement of interbody spacer, DePuy Conduit LLIF cage filled with allograft BMP and DBM  2. Right L5-S1 tubular circumferential fusion including interbody fusion and placement of interbody spacer, DePuy Conduit TLIF cage filled with allograft DBM, BMP and autograft harvested from the same incision, posterolateral arthrodesis using allograft morselized bone chips and autograft harvested from the same incision  3. L1-2 tubular bilateral laminectomy, medial facetectomy, foraminotomy  4. L4-S1 posterior segmental instrumentation using DePuy Viper Prime system  5. Registration of navigated instruments including intraoperative 3D imaging [facility] and [facility] station     Surgeon   Kael Flannery MD          HPI    Corey Montano is a 68 y.o. male who presents for follow up.  Overall he is doing well.  Minimal low back pain.  No leg pain.  Some numbness in the feet.  He has been wearing a back brace and using the bone growth stimulator.  He is walking well.  Takes gabapentin 400mg twice a day.    Patient denies any recent accidents or trauma, no saddle anesthesias, and no bowel or bladder incontinence.      Review of Systems:  Constitution: Negative for chills, fever, night sweats and weight loss.   Musculoskeletal: Negative for falls.   Gastrointestinal: Negative for bowel incontinence, nausea and vomiting.   Genitourinary: Negative for bladder incontinence.   Neurological: Negative for disturbances in coordination and loss of balance.      Objective:      Vitals:    09/18/24  "1510   BP: 113/76   Pulse: 79   Weight: 126.4 kg (278 lb 10.6 oz)   Height: 6' 2" (1.88 m)   PainSc: 0-No pain         Physical Exam:      General:  Corey Montano is well-developed, well-nourished, appears stated age, in no acute distress, alert and oriented to person, place, and time.    Pulmonary/Chest:  Respiratory effort normal  Abdominal: Exhibits no distension  Psychiatric:  Normal mood and affect.  Behavior is normal.  Judgement and thought content normal      Musculoskeletal:      Lumbar Spine Inspection:  Healed surgical scars with no visible rashes.        Neurological:  Alert and oriented to person, place, and time    Muscle strength against resistance:     Right Left   Hip flexion  5 / 5 5 / 5   Hip extension 5 / 5 5 / 5   Hip abduction 5 / 5 5 / 5   Hip adduction  5 / 5 5 / 5   Knee extension  5 / 5 5 / 5   Knee flexion 5 / 5 5 / 5   Dorsiflexion  5 / 5 0 / 5   EHL  5 / 5 0 / 5   Plantar flexion  5 / 5 5 / 5   Inversion of the feet 5 / 5 5 / 5   Eversion of the feet  5 / 5 5 / 5       On gross examination of the bilateral upper extremities, patient has full painfree ROM with no signs of clubbing, cyanosis, edema, or weakness.             Assessment:          1. S/P lumbar spinal fusion            Plan:          Orders Placed This Encounter    gabapentin (NEURONTIN) 600 MG tablet       -increase to 600 mg twice a day   -continue walking next-continue wearing back brace   -continue using bone growth stimulator  -he is not interested in outpatient PT at this time  -Patient will follow-up with Dr. Flannery in 6 weeks with xrays for the 3 month post op follow-up.      Follow-Up:  Follow up in about 6 weeks (around 10/30/2024). If there are any questions prior to this, the patient was instructed to contact the office.       Syeda Ceja [facility], PA-C  Neurosurgery  [facility]  09/18/2024            "

## 2024-11-07 ENCOUNTER — EXTERNAL HOME HEALTH (OUTPATIENT)
Dept: HOME HEALTH SERVICES | Facility: HOSPITAL | Age: 69
End: 2024-11-07
Payer: MEDICARE

## 2024-11-08 DIAGNOSIS — E78.5 HYPERLIPIDEMIA, UNSPECIFIED HYPERLIPIDEMIA TYPE: ICD-10-CM

## 2024-11-08 RX ORDER — ATORVASTATIN CALCIUM 10 MG/1
10 TABLET, FILM COATED ORAL NIGHTLY
Qty: 90 TABLET | Refills: 1 | Status: SHIPPED | OUTPATIENT
Start: 2024-11-08

## 2024-11-25 ENCOUNTER — HOSPITAL ENCOUNTER (OUTPATIENT)
Dept: RADIOLOGY | Facility: HOSPITAL | Age: 69
Discharge: HOME | End: 2024-11-25
Attending: NEUROLOGICAL SURGERY
Payer: MEDICARE

## 2024-11-25 ENCOUNTER — OFFICE VISIT (OUTPATIENT)
Dept: NEUROSURGERY | Facility: CLINIC | Age: 69
End: 2024-11-25
Payer: MEDICARE

## 2024-11-25 VITALS
SYSTOLIC BLOOD PRESSURE: 133 MMHG | DIASTOLIC BLOOD PRESSURE: 82 MMHG | WEIGHT: 278.69 LBS | HEIGHT: 74 IN | BODY MASS INDEX: 35.77 KG/M2 | HEART RATE: 73 BPM

## 2024-11-25 DIAGNOSIS — Z98.1 STATUS POST LUMBAR SPINAL FUSION: ICD-10-CM

## 2024-11-25 DIAGNOSIS — M21.372 LEFT FOOT DROP: Primary | ICD-10-CM

## 2024-11-25 DIAGNOSIS — Z98.1 S/P LUMBAR FUSION: ICD-10-CM

## 2024-11-25 PROCEDURE — 3079F DIAST BP 80-89 MM HG: CPT | Mod: CPTII,S$GLB,, | Performed by: NEUROLOGICAL SURGERY

## 2024-11-25 PROCEDURE — 3075F SYST BP GE 130 - 139MM HG: CPT | Mod: CPTII,S$GLB,, | Performed by: NEUROLOGICAL SURGERY

## 2024-11-25 PROCEDURE — 1126F AMNT PAIN NOTED NONE PRSNT: CPT | Mod: CPTII,S$GLB,, | Performed by: NEUROLOGICAL SURGERY

## 2024-11-25 PROCEDURE — 72082 X-RAY EXAM ENTIRE SPI 2/3 VW: CPT | Mod: 26,,, | Performed by: RADIOLOGY

## 2024-11-25 PROCEDURE — 4010F ACE/ARB THERAPY RXD/TAKEN: CPT | Mod: CPTII,S$GLB,, | Performed by: NEUROLOGICAL SURGERY

## 2024-11-25 PROCEDURE — 99999 PR PBB SHADOW E&M-EST. PATIENT-LVL III: CPT | Mod: PBBFAC,,, | Performed by: NEUROLOGICAL SURGERY

## 2024-11-25 PROCEDURE — 3061F NEG MICROALBUMINURIA REV: CPT | Mod: CPTII,S$GLB,, | Performed by: NEUROLOGICAL SURGERY

## 2024-11-25 PROCEDURE — 3044F HG A1C LEVEL LT 7.0%: CPT | Mod: CPTII,S$GLB,, | Performed by: NEUROLOGICAL SURGERY

## 2024-11-25 PROCEDURE — 3288F FALL RISK ASSESSMENT DOCD: CPT | Mod: CPTII,S$GLB,, | Performed by: NEUROLOGICAL SURGERY

## 2024-11-25 PROCEDURE — 99214 OFFICE O/P EST MOD 30 MIN: CPT | Mod: S$GLB,,, | Performed by: NEUROLOGICAL SURGERY

## 2024-11-25 PROCEDURE — 1101F PT FALLS ASSESS-DOCD LE1/YR: CPT | Mod: CPTII,S$GLB,, | Performed by: NEUROLOGICAL SURGERY

## 2024-11-25 PROCEDURE — 3066F NEPHROPATHY DOC TX: CPT | Mod: CPTII,S$GLB,, | Performed by: NEUROLOGICAL SURGERY

## 2024-11-25 PROCEDURE — 3008F BODY MASS INDEX DOCD: CPT | Mod: CPTII,S$GLB,, | Performed by: NEUROLOGICAL SURGERY

## 2024-11-25 PROCEDURE — 72082 X-RAY EXAM ENTIRE SPI 2/3 VW: CPT | Mod: TC,PN

## 2024-11-25 PROCEDURE — 1159F MED LIST DOCD IN RCRD: CPT | Mod: CPTII,S$GLB,, | Performed by: NEUROLOGICAL SURGERY

## 2024-11-25 NOTE — PROGRESS NOTES
NEUROSURGICAL PROGRESS NOTE    DATE OF SERVICE:  11/25/2024    ATTENDING PHYSICIAN:  Kael Flannery MD    SUBJECTIVE:    INTERIM HISTORY:    He is 3 months status post left L4-5 oblique interbody fusion, right L5-S1 transforaminal interbody fusion with posterior instrumentation, L1-2 laminectomy.  Patient is doing well.  Reports complete relief of leg pain compared to before surgery.  He is walking with a Rollator.  He has a new onset of left footdrop.  He has been complaining home health physical therapy overall he appears satisfied with his outcome.              PAST MEDICAL HISTORY:  Active Ambulatory Problems     Diagnosis Date Noted    Hypertension 06/09/2022    Hyperlipidemia 06/09/2022    Vitamin D deficiency 06/09/2022    Obesity 06/09/2022    Arthritis 06/09/2022    Preop testing 06/10/2022    Prediabetes 06/10/2022    Primary insomnia 06/10/2022    Erectile dysfunction 06/10/2022    Pain in both wrists 06/12/2023    Dorsalgia 06/12/2023    Grief 06/12/2023    Cauda equina syndrome 07/12/2023    Cervical myelopathy 08/02/2023    Bradycardia 10/03/2023    AV block 10/05/2023    Preop examination 06/19/2024    Primary osteoarthritis, other specified site 06/19/2024    Abnormal coagulation profile 06/19/2024    Spinal stenosis of lumbar region with neurogenic claudication 06/19/2024    Foraminal stenosis of lumbar region 08/05/2024    Pars defect of lumbar spine 08/05/2024    S/P lumbar spinal fusion 09/18/2024     Resolved Ambulatory Problems     Diagnosis Date Noted    No Resolved Ambulatory Problems     No Additional Past Medical History       PAST SURGICAL HISTORY:  Past Surgical History:   Procedure Laterality Date    ARTHROSCOPY OF KNEE  1974    CERVICAL LAMINECTOMY WITH SPINAL FUSION N/A 7/31/2023    Procedure: LAMINECTOMY, SPINE, CERVICAL, WITH FUSION;  Surgeon: Steven Clark MD;  Location: [facility] OR;  Service: Neurosurgery;  Laterality: N/A;  prone  chest  rolls  neuromonitoring  [facility]  [place]  C3-C6    COLONOSCOPY  07/16/2021    FUSION OF LUMBAR SPINE BY ANTERIOR APPROACH N/A 8/5/2024    Procedure: FUSION, SPINE, LUMBAR, ANTERIOR APPROACH;  Surgeon: Kael Flannery MD;  Location: [facility] OR;  Service: Neurosurgery;  Laterality: N/A;  Procedure:L4-5 OLiF, L5-S1 ALiF, Depuy conduit  Length of procedure: 2 hours  Anesthesia:General  Blood:Type and screen  Radiology:C-arm  Brace:LSO  Bed:Flat top  Position:Lateral right brianda  Equipment:Depuy-Mauricio, Othofix-Bone Stim    FUSION OF SPINE WITH INSTRUMENTATION N/A 8/5/2024    Procedure: FUSION, SPINE, WITH INSTRUMENTATION;  Surgeon: Kael Flannery MD;  Location: [facility] OR;  Service: Neurosurgery;  Laterality: N/A;  Procedure:L1-2 laminectomy, L4-S1 posterior instrumentation  Length of procedure: 3 hours  LOS: 3 nights  Anesthesia:General  Blood:Type and screen  Radiology:[facility], [facility]  Microscope:Metrx  Brace:LSO  Bed:[place] [id number] Poster  Position:Prone  Equipment:    INSERTION OF PACEMAKER N/A 10/5/2023    Procedure: INSERTION, PACEMAKER;  Surgeon: Moiz Perez MD;  Location: [facility] CATH LAB;  Service: Cardiology;  Laterality: N/A;    LAMINECTOMY, SPINE, MINIMALLY INVASIVE, POSTERIOR APPROACH N/A 8/5/2024    Procedure: LAMINECTOMY, SPINE, MINIMALLY INVASIVE, POSTERIOR APPROACH;  Surgeon: Kael Flannery MD;  Location: [facility] OR;  Service: Neurosurgery;  Laterality: N/A;    TRANSFORAMINAL EPIDURAL INJECTION OF STEROID Left 11/30/2023    Procedure: left  L4/5 + L5/S1 TF DENNYS;  Surgeon: Julio César Mckinley MD;  Location: [facility] PAIN MGT;  Service: Pain Management;  Laterality: Left;    UMBILICAL HERNIA REPAIR         SOCIAL HISTORY:   Social History     Socioeconomic History    Marital status:      Spouse name: Barbara    Number of children: 2   Tobacco Use    Smoking status: Never     Passive exposure: Never    Smokeless tobacco: Never   Substance and Sexual Activity    Alcohol use: Not Currently     Alcohol/week: 1.0  standard drink of alcohol     Types: 1 Cans of beer per week     Comment: occasional    Drug use: Yes     Types: Marijuana     Comment: Gummies    Sexual activity: Not Currently     Partners: Female     Social Drivers of Health     Financial Resource Strain: Low Risk  (8/6/2024)    Overall Financial Resource Strain (CARDIA)     Difficulty of Paying Living Expenses: Not hard at all   Recent Concern: Financial Resource Strain - Medium Risk (6/18/2024)    Overall Financial Resource Strain (CARDIA)     Difficulty of Paying Living Expenses: Somewhat hard   Food Insecurity: No Food Insecurity (8/6/2024)    Hunger Vital Sign     Worried About Running Out of Food in the Last Year: Never true     Ran Out of Food in the Last Year: Never true   Recent Concern: Food Insecurity - Food Insecurity Present (6/18/2024)    Hunger Vital Sign     Worried About Running Out of Food in the Last Year: Sometimes true     Ran Out of Food in the Last Year: Never true   Transportation Needs: No Transportation Needs (8/6/2024)    TRANSPORTATION NEEDS     Transportation : No   Physical Activity: Sufficiently Active (8/6/2024)    Exercise Vital Sign     Days of Exercise per Week: 5 days     Minutes of Exercise per Session: 60 min   Recent Concern: Physical Activity - Insufficiently Active (6/18/2024)    Exercise Vital Sign     Days of Exercise per Week: 3 days     Minutes of Exercise per Session: 30 min   Stress: No Stress Concern Present (8/6/2024)    [employer] - Occupational Stress Questionnaire     Feeling of Stress : Not at all   Recent Concern: Stress - Stress Concern Present (6/18/2024)    [employer] - Occupational Stress Questionnaire     Feeling of Stress : To some extent   Housing Stability: Low Risk  (8/6/2024)    Housing Stability Vital Sign     Unable to Pay for Housing in the Last Year: No     Homeless in the Last Year: No       FAMILY HISTORY:  Family History   Problem  Relation Name Age of Onset    Dementia Mother      Rheum arthritis Mother      Heart disease Father      Hypertension Father         CURRENTS MEDICATIONS:  Current Outpatient Medications on File Prior to Visit   Medication Sig Dispense Refill    acetaminophen (TYLENOL 8 HOUR ORAL) Take by mouth.      atorvastatin (LIPITOR) 10 MG tablet Take 1 tablet (10 mg total) by mouth every evening. 90 tablet 1    docusate sodium (COLACE) 100 MG capsule Take 100 mg by mouth 2 (two) times daily.      gabapentin (NEURONTIN) 600 MG tablet Take 1 tablet (600 mg total) by mouth 2 (two) times daily. 180 tablet 0    metoprolol succinate (TOPROL-XL) 50 MG 24 hr tablet Take 1 tablet (50 mg total) by mouth once daily. 90 tablet 1    oxyCODONE-acetaminophen (PERCOCET) 5-325 mg per tablet Take 1-2 tablets by mouth every 8 (eight) hours as needed for Pain. 36 tablet 0    hydroCHLOROthiazide (HYDRODIURIL) 25 MG tablet Take 1 tablet (25 mg total) by mouth once daily. 30 tablet 11    lisinopriL (PRINIVIL,ZESTRIL) 40 MG tablet Take 1 tablet (40 mg total) by mouth once daily. 90 tablet 3     No current facility-administered medications on file prior to visit.       ALLERGIES:  Review of patient's allergies indicates:  No Known Allergies    REVIEW OF SYSTEMS:  Review of Systems   Constitutional:  Negative for diaphoresis, fever and weight loss.   Respiratory:  Negative for shortness of breath.    Cardiovascular:  Negative for chest pain.   Gastrointestinal:  Negative for blood in stool.   Genitourinary:  Negative for hematuria.   Endo/Heme/Allergies:  Does not bruise/bleed easily.   All other systems reviewed and are negative.        OBJECTIVE:    PHYSICAL EXAMINATION:   Vitals:    11/25/24 1534   BP: 133/82   Pulse: 73       Physical Exam:  Vitals reviewed.    Constitutional: He appears well-developed and well-nourished.     Eyes: Pupils are equal, round, and reactive to light. Conjunctivae and EOM are normal.     Cardiovascular: Normal distal  pulses and no edema.     Abdominal: Soft.     Skin: Skin displays no rash on trunk and no rash on extremities. Skin displays no lesions on trunk and no lesions on extremities.     Psych/Behavior: He is alert. He is oriented to person, place, and time. He has a normal mood and affect.     Musculoskeletal:        Neck: Range of motion is full.       Ortho Exam      Neurological Exam  Mental Status  Alert. Oriented to person, place, and time.    Cranial Nerves  CN III, IV, VI: Extraocular movements intact bilaterally. Pupils equal round and reactive to light bilaterally.    Motor                                               Right                     Left  Ankle dorsiflexor                   4                          2        DIAGNOSTIC DATA:  I personally interpreted the following imaging:   Scoliosis film today shows good positioning of hardware, no lucency around hardware    ASSESSMENT:  This is a 69 y.o. male with     Problem List Items Addressed This Visit    None  Visit Diagnoses       Left foot drop    -  Primary    Relevant Orders    ANKLE FOOT ORTHOSIS FOR HOME USE    Status post lumbar spinal fusion                  PLAN:  Continue home physical therapy program  Left AFO brace ordered  Follow up in 3 months with repeat lumbar x-rays  All questions answered    More than 50% of the time was spent on discussing conservative management treatments (medication, physical therapy exercises) and possible interventions (spinal injections and surgical procedures). Care coordination was discussed.    30 min        Kael Flannery MD  Cell:[phone]

## 2024-11-29 NOTE — TELEPHONE ENCOUNTER
.   Melolabial Transposition Flap Text: The defect edges were debeveled with a #15 scalpel blade.  Given the location of the defect and the proximity to free margins a melolabial flap was deemed most appropriate.  Using a sterile surgical marker, an appropriate melolabial transposition flap was drawn incorporating the defect.    The area thus outlined was incised deep to adipose tissue with a #15 scalpel blade.  The skin margins were undermined to an appropriate distance in all directions utilizing iris scissors.

## 2024-12-02 DIAGNOSIS — I10 HYPERTENSION, UNSPECIFIED TYPE: ICD-10-CM

## 2024-12-03 RX ORDER — METOPROLOL SUCCINATE 50 MG/1
50 TABLET, EXTENDED RELEASE ORAL DAILY
Qty: 90 TABLET | Refills: 1 | Status: SHIPPED | OUTPATIENT
Start: 2024-12-03 | End: 2025-06-01

## 2024-12-13 ENCOUNTER — LAB VISIT (OUTPATIENT)
Dept: LAB | Facility: HOSPITAL | Age: 69
End: 2024-12-13
Attending: NURSE PRACTITIONER
Payer: MEDICARE

## 2024-12-13 DIAGNOSIS — I10 PRIMARY HYPERTENSION: ICD-10-CM

## 2024-12-13 DIAGNOSIS — R73.03 PREDIABETES: ICD-10-CM

## 2024-12-13 LAB
ALBUMIN SERPL-MCNC: 3.9 G/DL (ref 3.4–4.8)
ALBUMIN/GLOB SERPL: 1.2 RATIO (ref 1.1–2)
ALP SERPL-CCNC: 113 UNIT/L (ref 40–150)
ALT SERPL-CCNC: 32 UNIT/L (ref 0–55)
ANION GAP SERPL CALC-SCNC: 11 MEQ/L
AST SERPL-CCNC: 24 UNIT/L (ref 5–34)
BASOPHILS # BLD AUTO: 0.06 X10(3)/MCL
BASOPHILS NFR BLD AUTO: 0.8 %
BILIRUB SERPL-MCNC: 0.6 MG/DL
BUN SERPL-MCNC: 15.6 MG/DL (ref 8.4–25.7)
CALCIUM SERPL-MCNC: 10 MG/DL (ref 8.8–10)
CHLORIDE SERPL-SCNC: 101 MMOL/L (ref 98–107)
CO2 SERPL-SCNC: 26 MMOL/L (ref 23–31)
CREAT SERPL-MCNC: 0.81 MG/DL (ref 0.72–1.25)
CREAT UR-MCNC: 42 MG/DL (ref 63–166)
CREAT/UREA NIT SERPL: 19
EOSINOPHIL # BLD AUTO: 0.18 X10(3)/MCL (ref 0–0.9)
EOSINOPHIL NFR BLD AUTO: 2.3 %
ERYTHROCYTE [DISTWIDTH] IN BLOOD BY AUTOMATED COUNT: 14.8 % (ref 11.5–17)
EST. AVERAGE GLUCOSE BLD GHB EST-MCNC: 122.6 MG/DL
GFR SERPLBLD CREATININE-BSD FMLA CKD-EPI: >60 ML/MIN/1.73/M2
GLOBULIN SER-MCNC: 3.3 GM/DL (ref 2.4–3.5)
GLUCOSE SERPL-MCNC: 107 MG/DL (ref 82–115)
HBA1C MFR BLD: 5.9 %
HCT VFR BLD AUTO: 45.8 % (ref 42–52)
HGB BLD-MCNC: 15.3 G/DL (ref 14–18)
IMM GRANULOCYTES # BLD AUTO: 0.02 X10(3)/MCL (ref 0–0.04)
IMM GRANULOCYTES NFR BLD AUTO: 0.3 %
LYMPHOCYTES # BLD AUTO: 2.39 X10(3)/MCL (ref 0.6–4.6)
LYMPHOCYTES NFR BLD AUTO: 30.8 %
MCH RBC QN AUTO: 29.8 PG (ref 27–31)
MCHC RBC AUTO-ENTMCNC: 33.4 G/DL (ref 33–36)
MCV RBC AUTO: 89.1 FL (ref 80–94)
MICROALBUMIN UR-MCNC: <5 UG/ML
MICROALBUMIN/CREAT RATIO PNL UR: ABNORMAL
MONOCYTES # BLD AUTO: 0.76 X10(3)/MCL (ref 0.1–1.3)
MONOCYTES NFR BLD AUTO: 9.8 %
NEUTROPHILS # BLD AUTO: 4.34 X10(3)/MCL (ref 2.1–9.2)
NEUTROPHILS NFR BLD AUTO: 56 %
NRBC BLD AUTO-RTO: 0 %
PLATELET # BLD AUTO: 261 X10(3)/MCL (ref 130–400)
PMV BLD AUTO: 11 FL (ref 7.4–10.4)
POTASSIUM SERPL-SCNC: 4.3 MMOL/L (ref 3.5–5.1)
PROT SERPL-MCNC: 7.2 GM/DL (ref 5.8–7.6)
RBC # BLD AUTO: 5.14 X10(6)/MCL (ref 4.7–6.1)
SODIUM SERPL-SCNC: 138 MMOL/L (ref 136–145)
T4 FREE SERPL-MCNC: 0.97 NG/DL (ref 0.7–1.48)
TSH SERPL-ACNC: 2.11 UIU/ML (ref 0.35–4.94)
WBC # BLD AUTO: 7.75 X10(3)/MCL (ref 4.5–11.5)

## 2024-12-13 PROCEDURE — 82570 ASSAY OF URINE CREATININE: CPT

## 2024-12-13 PROCEDURE — 36415 COLL VENOUS BLD VENIPUNCTURE: CPT

## 2024-12-13 PROCEDURE — 83036 HEMOGLOBIN GLYCOSYLATED A1C: CPT

## 2024-12-13 PROCEDURE — 85025 COMPLETE CBC W/AUTO DIFF WBC: CPT

## 2024-12-13 PROCEDURE — 84443 ASSAY THYROID STIM HORMONE: CPT

## 2024-12-13 PROCEDURE — 84439 ASSAY OF FREE THYROXINE: CPT

## 2024-12-13 PROCEDURE — 80053 COMPREHEN METABOLIC PANEL: CPT

## 2024-12-19 ENCOUNTER — OFFICE VISIT (OUTPATIENT)
Dept: INTERNAL MEDICINE | Facility: CLINIC | Age: 69
End: 2024-12-19
Payer: MEDICARE

## 2024-12-19 VITALS
HEIGHT: 74 IN | RESPIRATION RATE: 18 BRPM | DIASTOLIC BLOOD PRESSURE: 78 MMHG | TEMPERATURE: 99 F | BODY MASS INDEX: 35.25 KG/M2 | OXYGEN SATURATION: 99 % | WEIGHT: 274.69 LBS | SYSTOLIC BLOOD PRESSURE: 140 MMHG | HEART RATE: 86 BPM

## 2024-12-19 DIAGNOSIS — I10 PRIMARY HYPERTENSION: ICD-10-CM

## 2024-12-19 DIAGNOSIS — I10 HYPERTENSION, UNSPECIFIED TYPE: ICD-10-CM

## 2024-12-19 DIAGNOSIS — E66.812 CLASS 2 OBESITY WITH BODY MASS INDEX (BMI) OF 35.0 TO 35.9 IN ADULT, UNSPECIFIED OBESITY TYPE, UNSPECIFIED WHETHER SERIOUS COMORBIDITY PRESENT: ICD-10-CM

## 2024-12-19 DIAGNOSIS — R73.03 PREDIABETES: Primary | ICD-10-CM

## 2024-12-19 DIAGNOSIS — M21.372 LEFT FOOT DROP: ICD-10-CM

## 2024-12-19 DIAGNOSIS — Z98.1 S/P LUMBAR SPINAL FUSION: ICD-10-CM

## 2024-12-19 DIAGNOSIS — E78.2 MIXED HYPERLIPIDEMIA: ICD-10-CM

## 2024-12-19 PROBLEM — Z01.818 PREOP TESTING: Status: RESOLVED | Noted: 2022-06-10 | Resolved: 2024-12-19

## 2024-12-19 PROBLEM — Z01.818 PREOP EXAMINATION: Status: RESOLVED | Noted: 2024-06-19 | Resolved: 2024-12-19

## 2024-12-19 PROCEDURE — 3288F FALL RISK ASSESSMENT DOCD: CPT | Mod: CPTII,,, | Performed by: NURSE PRACTITIONER

## 2024-12-19 PROCEDURE — 4010F ACE/ARB THERAPY RXD/TAKEN: CPT | Mod: CPTII,,, | Performed by: NURSE PRACTITIONER

## 2024-12-19 PROCEDURE — 1160F RVW MEDS BY RX/DR IN RCRD: CPT | Mod: CPTII,,, | Performed by: NURSE PRACTITIONER

## 2024-12-19 PROCEDURE — 3008F BODY MASS INDEX DOCD: CPT | Mod: CPTII,,, | Performed by: NURSE PRACTITIONER

## 2024-12-19 PROCEDURE — 1159F MED LIST DOCD IN RCRD: CPT | Mod: CPTII,,, | Performed by: NURSE PRACTITIONER

## 2024-12-19 PROCEDURE — 3061F NEG MICROALBUMINURIA REV: CPT | Mod: CPTII,,, | Performed by: NURSE PRACTITIONER

## 2024-12-19 PROCEDURE — 3044F HG A1C LEVEL LT 7.0%: CPT | Mod: CPTII,,, | Performed by: NURSE PRACTITIONER

## 2024-12-19 PROCEDURE — 99214 OFFICE O/P EST MOD 30 MIN: CPT | Mod: S$PBB,,, | Performed by: NURSE PRACTITIONER

## 2024-12-19 PROCEDURE — 99215 OFFICE O/P EST HI 40 MIN: CPT | Mod: PBBFAC | Performed by: NURSE PRACTITIONER

## 2024-12-19 PROCEDURE — 1101F PT FALLS ASSESS-DOCD LE1/YR: CPT | Mod: CPTII,,, | Performed by: NURSE PRACTITIONER

## 2024-12-19 PROCEDURE — 3078F DIAST BP <80 MM HG: CPT | Mod: CPTII,,, | Performed by: NURSE PRACTITIONER

## 2024-12-19 PROCEDURE — 1126F AMNT PAIN NOTED NONE PRSNT: CPT | Mod: CPTII,,, | Performed by: NURSE PRACTITIONER

## 2024-12-19 PROCEDURE — 3066F NEPHROPATHY DOC TX: CPT | Mod: CPTII,,, | Performed by: NURSE PRACTITIONER

## 2024-12-19 PROCEDURE — 3077F SYST BP >= 140 MM HG: CPT | Mod: CPTII,,, | Performed by: NURSE PRACTITIONER

## 2024-12-19 RX ORDER — HYDROCHLOROTHIAZIDE 25 MG/1
25 TABLET ORAL DAILY
Qty: 90 TABLET | Refills: 1 | Status: SHIPPED | OUTPATIENT
Start: 2024-12-19

## 2024-12-19 RX ORDER — LISINOPRIL 40 MG/1
40 TABLET ORAL DAILY
Qty: 90 TABLET | Refills: 1 | Status: SHIPPED | OUTPATIENT
Start: 2024-12-19 | End: 2025-12-19

## 2024-12-19 RX ORDER — METFORMIN HYDROCHLORIDE 500 MG/1
500 TABLET ORAL
Qty: 90 TABLET | Refills: 1 | Status: SHIPPED | OUTPATIENT
Start: 2024-12-19 | End: 2025-12-19

## 2024-12-19 NOTE — PROGRESS NOTES
Urvashi L Viviana, NP   [facility]  [address] [facility] [address]      PATIENT NAME: Corey Montano  : 1955  DATE: 24  MRN: [id number]        History of Present Illness / Problem Focused Workflow     Corey Montano presents to the clinic with Hypertension and Results     68 yo male to establish pcp care. Last seen by KRISTINA Musa NP 24. PMH back pain. Diagnosis includes HTN, HLD, IGT, vitamin D, obesity. Recently underwent left L4-:5 oblique interbody fusion, right L5-S1 transforaminal interbody fusion with posterior instrumentation, L1-L2 laminectomy. He has new onset left foot drop since surgery (noted per review of nsgy note 24). He is ambulating with walker today. He reports awaiting his brace for his left foot from ns.     /78. Labs completed 24 reviewed. A1c 5.9%. Asking about weight loss options.     Other providers   Kael Flannery- Neurosurgery- in [place], [place] ;  Dr. [facility] [place]   Cardiology, Dr. Perez/ Dr. Garsia at [facility] in [place]     Review of Systems     Review of Systems   Constitutional: Negative.    HENT: Negative.     Eyes: Negative.    Respiratory: Negative.     Cardiovascular: Negative.    Gastrointestinal: Negative.    Endocrine: Negative.    Genitourinary: Negative.    Musculoskeletal: Negative.    Skin: Negative.    Allergic/Immunologic: Negative.    Neurological: Negative.    Hematological: Negative.    Psychiatric/Behavioral: Negative.         Medical / Social / Family History     -------------------------------------    Preop examination    Preop testing        Past Surgical History:   Procedure Laterality Date    ARTHROSCOPY OF KNEE      CERVICAL LAMINECTOMY WITH SPINAL FUSION N/A 2023    Procedure: LAMINECTOMY, SPINE, CERVICAL, WITH FUSION;  Surgeon: Steven Clark MD;  Location: [facility] OR;  Service: Neurosurgery;  Laterality: N/A;  prone  chest  rolls  [facility]  [facility]  C[facility]    COLONOSCOPY  07/16/2021    FUSION OF LUMBAR SPINE BY ANTERIOR APPROACH N/A 8/5/2024    Procedure: FUSION, SPINE, LUMBAR, ANTERIOR APPROACH;  Surgeon: Kael Flannery MD;  Location: [facility] OR;  Service: Neurosurgery;  Laterality: N/A;  Procedure:L4-5 OLiF, L5-S1 ALiF, Depuy conduit  Length of procedure: 2 hours  Anesthesia:General  Blood:Type and screen  Radiology:C-arm  Brace:LSO  Bed:Flat top  Position:Lateral right brianda  Equipment:Depuy-Mauricio, Othofix-Bone Stim    FUSION OF SPINE WITH INSTRUMENTATION N/A 8/5/2024    Procedure: FUSION, SPINE, WITH INSTRUMENTATION;  Surgeon: Kael Flannery MD;  Location: [facility] OR;  Service: Neurosurgery;  Laterality: N/A;  Procedure:L1-2 laminectomy, L4-S1 posterior instrumentation  Length of procedure: 3 hours  LOS: 3 nights  Anesthesia:General  Blood:Type and screen  Radiology:[facility], Ziehm  Microscope:Metrx  Brace:LSO  Bed:[place] [id number] Poster  Position:Prone  Equipment:    INSERTION OF PACEMAKER N/A 10/5/2023    Procedure: INSERTION, PACEMAKER;  Surgeon: Moiz Perez MD;  Location: [facility] CATH LAB;  Service: Cardiology;  Laterality: N/A;    LAMINECTOMY, SPINE, MINIMALLY INVASIVE, POSTERIOR APPROACH N/A 8/5/2024    Procedure: LAMINECTOMY, SPINE, MINIMALLY INVASIVE, POSTERIOR APPROACH;  Surgeon: Kael Flannery MD;  Location: [facility] OR;  Service: Neurosurgery;  Laterality: N/A;    TRANSFORAMINAL EPIDURAL INJECTION OF STEROID Left 11/30/2023    Procedure: left  L4/5 + L5/S1 TF DENNYS;  Surgeon: Julio César Mckinley MD;  Location: [facility] PAIN MGT;  Service: Pain Management;  Laterality: Left;    UMBILICAL HERNIA REPAIR         Social History     Socioeconomic History    Marital status:      Spouse name: Barbara    Number of children: 2   Tobacco Use    Smoking status: Never     Passive exposure: Never    Smokeless tobacco: Never   Substance and Sexual Activity    Alcohol use: Not Currently     Alcohol/week: 1.0 standard drink of  alcohol     Types: 1 Cans of beer per week     Comment: occasional    Drug use: Yes     Types: Marijuana     Comment: Gummies    Sexual activity: Not Currently     Partners: Female     Social Drivers of Health     Financial Resource Strain: Low Risk  (8/6/2024)    Overall Financial Resource Strain (CARDIA)     Difficulty of Paying Living Expenses: Not hard at all   Recent Concern: Financial Resource Strain - Medium Risk (6/18/2024)    Overall Financial Resource Strain (CARDIA)     Difficulty of Paying Living Expenses: Somewhat hard   Food Insecurity: No Food Insecurity (8/6/2024)    Hunger Vital Sign     Worried About Running Out of Food in the Last Year: Never true     Ran Out of Food in the Last Year: Never true   Recent Concern: Food Insecurity - Food Insecurity Present (6/18/2024)    Hunger Vital Sign     Worried About Running Out of Food in the Last Year: Sometimes true     Ran Out of Food in the Last Year: Never true   Transportation Needs: No Transportation Needs (8/6/2024)    TRANSPORTATION NEEDS     Transportation : No   Physical Activity: Sufficiently Active (8/6/2024)    Exercise Vital Sign     Days of Exercise per Week: 5 days     Minutes of Exercise per Session: 60 min   Recent Concern: Physical Activity - Insufficiently Active (6/18/2024)    Exercise Vital Sign     Days of Exercise per Week: 3 days     Minutes of Exercise per Session: 30 min   Stress: No Stress Concern Present (8/6/2024)    Citizen of the [place] of Occupational Health - Occupational Stress Questionnaire     Feeling of Stress : Not at all   Recent Concern: Stress - Stress Concern Present (6/18/2024)    Citizen of [place] of Occupational Health - Occupational Stress Questionnaire     Feeling of Stress : To some extent   Housing Stability: Low Risk  (8/6/2024)    Housing Stability Vital Sign     Unable to Pay for Housing in the Last Year: No     Homeless in the Last Year: No        Family History   Problem Relation Name Age of Onset     "Dementia Mother      Rheum arthritis Mother      Heart disease Father      Hypertension Father          Medications and Allergies     Medications  Current Outpatient Medications   Medication Instructions    acetaminophen (TYLENOL 8 HOUR ORAL) Take by mouth.    atorvastatin (LIPITOR) 10 mg, Oral, Nightly    docusate sodium (COLACE) 100 mg, 2 times daily    gabapentin (NEURONTIN) 600 mg, Oral, 2 times daily    hydroCHLOROthiazide (HYDRODIURIL) 25 mg, Oral, Daily    lisinopriL (PRINIVIL,ZESTRIL) 40 mg, Oral, Daily    metFORMIN (GLUCOPHAGE) 500 mg, Oral, With breakfast    metoprolol succinate (TOPROL-XL) 50 mg, Oral, Daily       Allergies  Review of patient's allergies indicates:  No Known Allergies    Physical Examination     Visit Vitals  BP (!) 140/78 (BP Location: Left arm, Patient Position: Sitting)   Pulse 86   Temp 98.6 °F (37 °C) (Oral)   Resp 18   Ht 6' 2.02" (1.88 m)   Wt 124.6 kg (274 lb 11.2 oz)   SpO2 99%   BMI 35.25 kg/m²       Physical Exam  Constitutional:       General: He is not in acute distress.     Appearance: Normal appearance. He is obese. He is not ill-appearing, toxic-appearing or diaphoretic.   HENT:      Head: Normocephalic.   Cardiovascular:      Rate and Rhythm: Normal rate and regular rhythm.   Pulmonary:      Effort: Pulmonary effort is normal. No respiratory distress.      Breath sounds: Normal breath sounds.   Musculoskeletal:      Comments: Ambulate with rollator    Skin:     General: Skin is warm and dry.   Neurological:      General: No focal deficit present.      Mental Status: He is alert and oriented to person, place, and time. Mental status is at baseline.      Motor: No weakness.      Coordination: Coordination normal.      Gait: Gait normal.   Psychiatric:         Mood and Affect: Mood normal.         Behavior: Behavior normal.         Thought Content: Thought content normal.         Judgment: Judgment normal.           Results     Lab Results   Component Value Date    WBC 7.75 " 12/13/2024    RBC 5.14 12/13/2024    HGB 15.3 12/13/2024    HCT 45.8 12/13/2024    MCV 89.1 12/13/2024    MCH 29.8 12/13/2024    MCHC 33.4 12/13/2024    RDW 14.8 12/13/2024     12/13/2024    MPV 11.0 (H) 12/13/2024    GRAN 7.7 08/06/2024    GRAN 79.0 (H) 08/06/2024    LYMPH 1.2 08/06/2024    LYMPH 11.9 (L) 08/06/2024    MONO 0.9 08/06/2024    MONO 8.8 08/06/2024    EOS 0.0 08/06/2024    BASO 0.01 08/06/2024    EOSINOPHIL 0.0 08/06/2024    BASOPHIL 0.1 08/06/2024     Sodium   Date Value Ref Range Status   12/13/2024 138 136 - 145 mmol/L Final   08/06/2024 135 (L) 136 - 145 mmol/L Final     Potassium   Date Value Ref Range Status   12/13/2024 4.3 3.5 - 5.1 mmol/L Final   08/06/2024 4.2 3.5 - 5.1 mmol/L Final     Chloride   Date Value Ref Range Status   12/13/2024 101 98 - 107 mmol/L Final   08/06/2024 105 95 - 110 mmol/L Final     CO2   Date Value Ref Range Status   12/13/2024 26 23 - 31 mmol/L Final   08/06/2024 22 (L) 23 - 29 mmol/L Final     Glucose   Date Value Ref Range Status   08/06/2024 141 (H) 70 - 110 mg/dL Final     BUN   Date Value Ref Range Status   08/06/2024 20 8 - 23 mg/dL Final     Blood Urea Nitrogen   Date Value Ref Range Status   12/13/2024 15.6 8.4 - 25.7 mg/dL Final     Creatinine   Date Value Ref Range Status   12/13/2024 0.81 0.72 - 1.25 mg/dL Final   08/06/2024 0.8 0.5 - 1.4 mg/dL Final     Calcium   Date Value Ref Range Status   12/13/2024 10.0 8.8 - 10.0 mg/dL Final   08/06/2024 8.7 8.7 - 10.5 mg/dL Final     Albumin   Date Value Ref Range Status   12/13/2024 3.9 3.4 - 4.8 g/dL Final     Bilirubin Total   Date Value Ref Range Status   12/13/2024 0.6 <=1.5 mg/dL Final     ALP   Date Value Ref Range Status   12/13/2024 113 40 - 150 unit/L Final     AST   Date Value Ref Range Status   12/13/2024 24 5 - 34 unit/L Final     ALT   Date Value Ref Range Status   12/13/2024 32 0 - 55 unit/L Final     Anion Gap   Date Value Ref Range Status   08/06/2024 8 8 - 16 mmol/L Final     Estimated  GFR-Non    Date Value Ref Range Status   05/31/2022 >60 mls/min/1.73/m2 Final     Lab Results   Component Value Date    CHOL 195 06/19/2024     Lab Results   Component Value Date    HDL 36 06/19/2024     Lab Results   Component Value Date    TRIG 160 (H) 06/19/2024     Lab Results   Component Value Date    .00 06/19/2024     Lab Results   Component Value Date    TSH 2.106 12/13/2024     Lab Results   Component Value Date    PHUR 6.0 06/19/2024    PROTEINUA Negative 06/19/2024    GLUCUA Normal 06/19/2024    OCCULTUA Negative 06/19/2024    NITRITE Negative 06/19/2024    LEUKOCYTESUR Negative 06/19/2024     Lab Results   Component Value Date    HGBA1C 5.9 12/13/2024    HGBA1C 5.9 06/19/2024    HGBA1C 5.5 07/28/2023     Lab Results   Component Value Date    MICALBCREAT  12/13/2024      Comment:      Unable to calculate        Assessment       ICD-10-CM ICD-9-CM   1. Prediabetes  R73.03 790.29   2. Mixed hyperlipidemia  E78.2 272.2   3. Primary hypertension  I10 401.9   4. S/P lumbar spinal fusion  Z98.1 V45.4   5. Left foot drop  M21.372 736.79   6. Class 2 obesity with body mass index (BMI) of 35.0 to 35.9 in adult, unspecified obesity type, unspecified whether serious comorbidity present  E66.812 278.00    Z68.35 V85.35   7. Hypertension, unspecified type  I10 401.9       Plan       Problem List Items Addressed This Visit          Neuro    Left foot drop    S/P lumbar spinal fusion    Overview     Est with PARKER Valdez         Current Assessment & Plan     Keep apt as scheduled, encouraged use of brace for left foot drop            Cardiac/Vascular    Hyperlipidemia    Overview     Managed with Atorvastatin 10 mg po daily         Current Assessment & Plan     Lab Results   Component Value Date    CHOL 195 06/19/2024     Lab Results   Component Value Date    HDL 36 06/19/2024     Lab Results   Component Value Date    TRIG 160 (H) 06/19/2024     Lab Results   Component Value Date     .00 06/19/2024     Avoid tobacco/ alcohol  Follow low fat/low cholesterol diet such as avoid/ decrease roberts, sausage, fried foods, cookies, cakes, chips, cheese, whole milk, butter, mayonnaise. Add olive oil, avocados, lean meats, fresh fruits/ vegetables, heart healthy nuts to diet.  Educated on health benefits of exercise 5 days/ week of at least 30 minutes moderate intensity exercise (brisk walking) and 2 days/ week of muscle strength activities  Daily ASA 81 mg for CV prevention  Continue current medication regimen           Relevant Medications    lisinopriL (PRINIVIL,ZESTRIL) 40 MG tablet    hydroCHLOROthiazide (HYDRODIURIL) 25 MG tablet    Hypertension    Overview     Managed with HCTZ 25mg po daily, Lisinopril 40 mg           Current Assessment & Plan     BP Readings from Last 3 Encounters:   12/19/24 (!) 140/78   11/25/24 133/82   09/18/24 113/76     Follow low sodium diet, < 2 gm/day (avoid high salty foods such as processed meats/ sausage/roberts/ sandwich meat, chips, pickles, cheese, crackers and soft drinks/ electrolyte replacement drinks).  Avoid tobacco/ alcohol use  Educated on health benefits of at least 5 days/ week of 30 minutes moderate intensity exercise (brisk walking) and 2 or more days/ week of muscle strength activities  Daily ASA 81 mg for CV prevention  Continue current medication regimen              Endocrine    Obesity    Current Assessment & Plan     BMI 35.25  Educated on increased risk of disease s/t obesity.  Educated on health benefits of at least 5 days/ week of 30 minutes moderate intensity exercise (brisk walking) and 2 or more days/ week of muscle strength activities (as tolerated).  Eat well balanced diet of fresh fruits/ vegetables, whole grains, lean meats and limit high carbohydrate foods.            Relevant Medications    metFORMIN (GLUCOPHAGE) 500 MG tablet    Prediabetes - Primary    Current Assessment & Plan     Lab Results   Component Value Date    HGBA1C 5.9  12/13/2024     ADA diet, regular exercise as tolerated (limited at present due to recent back surgery)   Need records from cardiologist to review         Relevant Medications    metFORMIN (GLUCOPHAGE) 500 MG tablet       Future Appointments   Date Time Provider Department Center   2/26/2025  1:30 PM Syeda Ceja, PA-C [facility]   3/31/2025  2:00 PM Urvashi Michelle NP [facility]        Follow up in about 3 months (around 3/19/2025) for HTN .    Signature:  Urvashi Michelle NP  [facility]  [address] [address]    Date of encounter: 12/19/24

## 2024-12-27 NOTE — ASSESSMENT & PLAN NOTE
BP Readings from Last 3 Encounters:   12/19/24 (!) 140/78   11/25/24 133/82   09/18/24 113/76   Did not take medications  Follow low sodium diet, < 2 gm/day (avoid high salty foods such as processed meats/ sausage/roberts/ sandwich meat, chips, pickles, cheese, crackers and soft drinks/ electrolyte replacement drinks).  Avoid tobacco/ alcohol use  Educated on health benefits of at least 5 days/ week of 30 minutes moderate intensity exercise (brisk walking) and 2 or more days/ week of muscle strength activities  Daily ASA 81 mg for CV prevention  Continue current medication regimen

## 2024-12-27 NOTE — ASSESSMENT & PLAN NOTE
Lab Results   Component Value Date    HGBA1C 5.9 12/13/2024     ADA diet, regular exercise as tolerated (limited at present due to recent back surgery)   Need records from cardiologist to review

## 2024-12-27 NOTE — ASSESSMENT & PLAN NOTE
BMI 35.25  Educated on increased risk of disease s/t obesity.  Educated on health benefits of at least 5 days/ week of 30 minutes moderate intensity exercise (brisk walking) and 2 or more days/ week of muscle strength activities (as tolerated).  Eat well balanced diet of fresh fruits/ vegetables, whole grains, lean meats and limit high carbohydrate foods.

## 2024-12-27 NOTE — ASSESSMENT & PLAN NOTE
Lab Results   Component Value Date    CHOL 195 06/19/2024     Lab Results   Component Value Date    HDL 36 06/19/2024     Lab Results   Component Value Date    TRIG 160 (H) 06/19/2024     Lab Results   Component Value Date    .00 06/19/2024     Avoid tobacco/ alcohol  Follow low fat/low cholesterol diet such as avoid/ decrease roberts, sausage, fried foods, cookies, cakes, chips, cheese, whole milk, butter, mayonnaise. Add olive oil, avocados, lean meats, fresh fruits/ vegetables, heart healthy nuts to diet.  Educated on health benefits of exercise 5 days/ week of at least 30 minutes moderate intensity exercise (brisk walking) and 2 days/ week of muscle strength activities  Daily ASA 81 mg for CV prevention  Continue current medication regimen

## 2025-02-03 ENCOUNTER — TELEPHONE (OUTPATIENT)
Dept: NEUROSURGERY | Facility: CLINIC | Age: 70
End: 2025-02-03
Payer: MEDICARE

## 2025-02-03 NOTE — TELEPHONE ENCOUNTER
Returned pt's call, I informed pt that I will be sending his AFO order over to [facility] again and I will follow up with at the end of the week to make sure they get it. Pt voiced understanding

## 2025-02-26 ENCOUNTER — RESULTS FOLLOW-UP (OUTPATIENT)
Dept: NEUROSURGERY | Facility: CLINIC | Age: 70
End: 2025-02-26

## 2025-02-26 ENCOUNTER — OFFICE VISIT (OUTPATIENT)
Dept: NEUROSURGERY | Facility: CLINIC | Age: 70
End: 2025-02-26
Payer: MEDICARE

## 2025-02-26 ENCOUNTER — HOSPITAL ENCOUNTER (OUTPATIENT)
Dept: RADIOLOGY | Facility: HOSPITAL | Age: 70
Discharge: HOME | End: 2025-02-26
Attending: PHYSICIAN ASSISTANT
Payer: MEDICARE

## 2025-02-26 VITALS
WEIGHT: 274.69 LBS | DIASTOLIC BLOOD PRESSURE: 84 MMHG | HEART RATE: 88 BPM | HEIGHT: 74 IN | SYSTOLIC BLOOD PRESSURE: 155 MMHG | BODY MASS INDEX: 35.25 KG/M2

## 2025-02-26 DIAGNOSIS — Z98.1 S/P LUMBAR SPINAL FUSION: ICD-10-CM

## 2025-02-26 DIAGNOSIS — Z98.1 S/P LUMBAR SPINAL FUSION: Primary | ICD-10-CM

## 2025-02-26 PROCEDURE — 1159F MED LIST DOCD IN RCRD: CPT | Mod: CPTII,S$GLB,, | Performed by: PHYSICIAN ASSISTANT

## 2025-02-26 PROCEDURE — 1126F AMNT PAIN NOTED NONE PRSNT: CPT | Mod: CPTII,S$GLB,, | Performed by: PHYSICIAN ASSISTANT

## 2025-02-26 PROCEDURE — 72100 X-RAY EXAM L-S SPINE 2/3 VWS: CPT | Mod: 26,,, | Performed by: RADIOLOGY

## 2025-02-26 PROCEDURE — 1160F RVW MEDS BY RX/DR IN RCRD: CPT | Mod: CPTII,S$GLB,, | Performed by: PHYSICIAN ASSISTANT

## 2025-02-26 PROCEDURE — 99214 OFFICE O/P EST MOD 30 MIN: CPT | Mod: S$GLB,,, | Performed by: PHYSICIAN ASSISTANT

## 2025-02-26 PROCEDURE — 1101F PT FALLS ASSESS-DOCD LE1/YR: CPT | Mod: CPTII,S$GLB,, | Performed by: PHYSICIAN ASSISTANT

## 2025-02-26 PROCEDURE — 72100 X-RAY EXAM L-S SPINE 2/3 VWS: CPT | Mod: TC,FY

## 2025-02-26 PROCEDURE — 3079F DIAST BP 80-89 MM HG: CPT | Mod: CPTII,S$GLB,, | Performed by: PHYSICIAN ASSISTANT

## 2025-02-26 PROCEDURE — 3288F FALL RISK ASSESSMENT DOCD: CPT | Mod: CPTII,S$GLB,, | Performed by: PHYSICIAN ASSISTANT

## 2025-02-26 PROCEDURE — 3077F SYST BP >= 140 MM HG: CPT | Mod: CPTII,S$GLB,, | Performed by: PHYSICIAN ASSISTANT

## 2025-02-26 PROCEDURE — 3008F BODY MASS INDEX DOCD: CPT | Mod: CPTII,S$GLB,, | Performed by: PHYSICIAN ASSISTANT

## 2025-02-26 PROCEDURE — 99999 PR PBB SHADOW E&M-EST. PATIENT-LVL IV: CPT | Mod: PBBFAC,,, | Performed by: PHYSICIAN ASSISTANT

## 2025-02-26 NOTE — PROGRESS NOTES
"  Subjective:     Patient ID:  Corey Montano is a 69 y.o. male.    Prudencio    Chief Complaint: 6 month po lspine fusion    HPI    Corey Montano is a 69 y.o. male who presents for follow up.  Patient denies any neck or back pain.  No arm pain or paresthesias.  He still has numbness in both feet.  He has left foot weakness.  He has not received AFO brace yet.  He walks with a walker.  He does exercises on his own home.    Patient denies any recent accidents or trauma, no saddle anesthesias, and no bowel or bladder incontinence.      Review of Systems:  Constitution: Negative for chills, fever, night sweats and weight loss.   Musculoskeletal: Negative for falls.   Gastrointestinal: Negative for bowel incontinence, nausea and vomiting.   Genitourinary: Negative for bladder incontinence.   Neurological: Negative for disturbances in coordination and loss of balance.      Objective:      Vitals:    02/26/25 1308   BP: (!) 155/84   Pulse: 88   Weight: 124.6 kg (274 lb 11.1 oz)   Height: 6' 2.02" (1.88 m)   PainSc: 0-No pain         Physical Exam:      General:  Corey Montano is well-developed, well-nourished, appears stated age, in no acute distress, alert and oriented to person, place, and time.    Pulmonary/Chest:  Respiratory effort normal  Abdominal: Exhibits no distension  Psychiatric:  Normal mood and affect.  Behavior is normal.  Judgement and thought content normal      Musculoskeletal:      Lumbar ROM:   Mild pain in lumbar flexion, extension, left lateral bending, and right lateral bending.      Neurological:  Alert and oriented to person, place, and time    Muscle strength against resistance:  5/5 in BL UE     Right Left   Hip flexion  5 / 5 5 / 5   Hip extension 5 / 5 5 / 5   Hip abduction 5 / 5 5 / 5   Hip adduction  5 / 5 5 / 5   Knee extension  5 / 5 5 / 5   Knee flexion 5 / 5 5 / 5   Dorsiflexion  5 / 5 0 / 5   EHL  5 / 5 2 / 5   Plantar flexion  5 / 5 5 / 5   Inversion of the feet 5 / 5 5 / 5   Eversion of " the feet  5 / 5 5 / 5       Reflexes:      Clonus:  Negative bilaterally    On gross examination of the bilateral upper extremities, patient has full painfree ROM with no signs of clubbing, cyanosis, edema, or weakness.       No imaging to review      Assessment:          1. S/P lumbar spinal fusion            Plan:          Orders Placed This Encounter    X-Ray Lumbar Spine Ap And Lateral    X-Ray Scoliosis Complete     -continue walking   -continue home exercises   -will inquire about left AFO brace   -L-spine x-rays today   -follow-up in 6 months with Dr. Flannery for 1 year postop follow up with scoliosis x-rays      Follow-Up:  Follow up in about 6 months (around 8/26/2025). If there are any questions prior to this, the patient was instructed to contact the office.       Syeda Ceja [facility], PA-C  Neurosurgery  [facility]  02/26/2025

## 2025-06-03 ENCOUNTER — TELEPHONE (OUTPATIENT)
Dept: INTERNAL MEDICINE | Facility: CLINIC | Age: 70
End: 2025-06-03
Payer: MEDICARE

## 2025-06-05 DIAGNOSIS — E78.2 MIXED HYPERLIPIDEMIA: ICD-10-CM

## 2025-06-05 RX ORDER — HYDROCHLOROTHIAZIDE 25 MG/1
25 TABLET ORAL
Qty: 90 TABLET | Refills: 0 | Status: SHIPPED | OUTPATIENT
Start: 2025-06-05

## 2025-07-29 ENCOUNTER — LAB VISIT (OUTPATIENT)
Dept: LAB | Facility: HOSPITAL | Age: 70
End: 2025-07-29
Attending: NURSE PRACTITIONER
Payer: MEDICARE

## 2025-07-29 ENCOUNTER — RESULTS FOLLOW-UP (OUTPATIENT)
Dept: INTERNAL MEDICINE | Facility: CLINIC | Age: 70
End: 2025-07-29
Payer: MEDICARE

## 2025-07-29 ENCOUNTER — OFFICE VISIT (OUTPATIENT)
Dept: INTERNAL MEDICINE | Facility: CLINIC | Age: 70
End: 2025-07-29
Payer: MEDICARE

## 2025-07-29 VITALS
HEART RATE: 63 BPM | RESPIRATION RATE: 16 BRPM | DIASTOLIC BLOOD PRESSURE: 68 MMHG | WEIGHT: 276.19 LBS | SYSTOLIC BLOOD PRESSURE: 127 MMHG | TEMPERATURE: 98 F | OXYGEN SATURATION: 99 % | HEIGHT: 74 IN | BODY MASS INDEX: 35.45 KG/M2

## 2025-07-29 DIAGNOSIS — E78.2 MIXED HYPERLIPIDEMIA: ICD-10-CM

## 2025-07-29 DIAGNOSIS — R73.03 PREDIABETES: ICD-10-CM

## 2025-07-29 DIAGNOSIS — I10 PRIMARY HYPERTENSION: ICD-10-CM

## 2025-07-29 DIAGNOSIS — E66.9 OBESITY (BMI 35.0-39.9 WITHOUT COMORBIDITY): ICD-10-CM

## 2025-07-29 DIAGNOSIS — Z12.5 PROSTATE CANCER SCREENING: ICD-10-CM

## 2025-07-29 DIAGNOSIS — E55.9 VITAMIN D DEFICIENCY: ICD-10-CM

## 2025-07-29 DIAGNOSIS — Z00.00 WELLNESS EXAMINATION: ICD-10-CM

## 2025-07-29 DIAGNOSIS — R73.03 PREDIABETES: Primary | ICD-10-CM

## 2025-07-29 LAB
25(OH)D3+25(OH)D2 SERPL-MCNC: 30 NG/ML (ref 30–80)
ALBUMIN SERPL-MCNC: 3.7 G/DL (ref 3.4–4.8)
ALBUMIN/CREAT UR: ABNORMAL
ALBUMIN/GLOB SERPL: 1.1 RATIO (ref 1.1–2)
ALP SERPL-CCNC: 94 UNIT/L (ref 40–150)
ALT SERPL-CCNC: 37 UNIT/L (ref 0–55)
ANION GAP SERPL CALC-SCNC: 8 MEQ/L
AST SERPL-CCNC: 26 UNIT/L (ref 11–45)
BACTERIA #/AREA URNS AUTO: ABNORMAL /HPF
BASOPHILS # BLD AUTO: 0.05 X10(3)/MCL
BASOPHILS NFR BLD AUTO: 0.6 %
BILIRUB SERPL-MCNC: 0.5 MG/DL
BILIRUB UR QL STRIP.AUTO: NEGATIVE
BUN SERPL-MCNC: 17.4 MG/DL (ref 8.4–25.7)
CALCIUM SERPL-MCNC: 9.2 MG/DL (ref 8.8–10)
CHLORIDE SERPL-SCNC: 104 MMOL/L (ref 98–107)
CLARITY UR: CLEAR
CO2 SERPL-SCNC: 24 MMOL/L (ref 23–31)
COLOR UR AUTO: COLORLESS
CREAT SERPL-MCNC: 0.75 MG/DL (ref 0.72–1.25)
CREAT UR-MCNC: 55.4 MG/DL (ref 63–166)
CREAT/UREA NIT SERPL: 23
EOSINOPHIL # BLD AUTO: 0.22 X10(3)/MCL (ref 0–0.9)
EOSINOPHIL NFR BLD AUTO: 2.6 %
ERYTHROCYTE [DISTWIDTH] IN BLOOD BY AUTOMATED COUNT: 13.4 % (ref 11.5–17)
EST. AVERAGE GLUCOSE BLD GHB EST-MCNC: 125.5 MG/DL
GFR SERPLBLD CREATININE-BSD FMLA CKD-EPI: >60 ML/MIN/1.73/M2
GLOBULIN SER-MCNC: 3.5 GM/DL (ref 2.4–3.5)
GLUCOSE SERPL-MCNC: 98 MG/DL (ref 82–115)
GLUCOSE UR QL STRIP: NORMAL
HBA1C MFR BLD: 6 %
HCT VFR BLD AUTO: 46 % (ref 42–52)
HGB BLD-MCNC: 15.4 G/DL (ref 14–18)
HGB UR QL STRIP: NEGATIVE
HYALINE CASTS #/AREA URNS LPF: ABNORMAL /LPF
IMM GRANULOCYTES # BLD AUTO: 0.04 X10(3)/MCL (ref 0–0.04)
IMM GRANULOCYTES NFR BLD AUTO: 0.5 %
KETONES UR QL STRIP: NEGATIVE
LEUKOCYTE ESTERASE UR QL STRIP: NEGATIVE
LYMPHOCYTES # BLD AUTO: 2.46 X10(3)/MCL (ref 0.6–4.6)
LYMPHOCYTES NFR BLD AUTO: 28.6 %
MCH RBC QN AUTO: 31.2 PG (ref 27–31)
MCHC RBC AUTO-ENTMCNC: 33.5 G/DL (ref 33–36)
MCV RBC AUTO: 93.1 FL (ref 80–94)
MICROALBUMIN UR-MCNC: <5 UG/ML
MONOCYTES # BLD AUTO: 0.83 X10(3)/MCL (ref 0.1–1.3)
MONOCYTES NFR BLD AUTO: 9.7 %
NEUTROPHILS # BLD AUTO: 4.99 X10(3)/MCL (ref 2.1–9.2)
NEUTROPHILS NFR BLD AUTO: 58 %
NITRITE UR QL STRIP: NEGATIVE
NRBC BLD AUTO-RTO: 0 %
PH UR STRIP: 6 [PH]
PLATELET # BLD AUTO: 217 X10(3)/MCL (ref 130–400)
PMV BLD AUTO: 11.9 FL (ref 7.4–10.4)
POTASSIUM SERPL-SCNC: 4.4 MMOL/L (ref 3.5–5.1)
PROT SERPL-MCNC: 7.2 GM/DL (ref 5.8–7.6)
PROT UR QL STRIP: NEGATIVE
PSA SERPL-MCNC: 2.24 NG/ML
RBC # BLD AUTO: 4.94 X10(6)/MCL (ref 4.7–6.1)
RBC #/AREA URNS AUTO: ABNORMAL /HPF
SODIUM SERPL-SCNC: 136 MMOL/L (ref 136–145)
SP GR UR STRIP.AUTO: 1.01 (ref 1–1.03)
SQUAMOUS #/AREA URNS LPF: ABNORMAL /HPF
UROBILINOGEN UR STRIP-ACNC: NORMAL
WBC # BLD AUTO: 8.59 X10(3)/MCL (ref 4.5–11.5)
WBC #/AREA URNS AUTO: ABNORMAL /HPF

## 2025-07-29 PROCEDURE — 82043 UR ALBUMIN QUANTITATIVE: CPT

## 2025-07-29 PROCEDURE — 36415 COLL VENOUS BLD VENIPUNCTURE: CPT

## 2025-07-29 PROCEDURE — 3074F SYST BP LT 130 MM HG: CPT | Mod: CPTII,,, | Performed by: NURSE PRACTITIONER

## 2025-07-29 PROCEDURE — 1126F AMNT PAIN NOTED NONE PRSNT: CPT | Mod: CPTII,,, | Performed by: NURSE PRACTITIONER

## 2025-07-29 PROCEDURE — 1101F PT FALLS ASSESS-DOCD LE1/YR: CPT | Mod: CPTII,,, | Performed by: NURSE PRACTITIONER

## 2025-07-29 PROCEDURE — 83036 HEMOGLOBIN GLYCOSYLATED A1C: CPT

## 2025-07-29 PROCEDURE — 84153 ASSAY OF PSA TOTAL: CPT

## 2025-07-29 PROCEDURE — 3066F NEPHROPATHY DOC TX: CPT | Mod: CPTII,,, | Performed by: NURSE PRACTITIONER

## 2025-07-29 PROCEDURE — 1160F RVW MEDS BY RX/DR IN RCRD: CPT | Mod: CPTII,,, | Performed by: NURSE PRACTITIONER

## 2025-07-29 PROCEDURE — 1159F MED LIST DOCD IN RCRD: CPT | Mod: CPTII,,, | Performed by: NURSE PRACTITIONER

## 2025-07-29 PROCEDURE — 3044F HG A1C LEVEL LT 7.0%: CPT | Mod: CPTII,,, | Performed by: NURSE PRACTITIONER

## 2025-07-29 PROCEDURE — 81001 URINALYSIS AUTO W/SCOPE: CPT

## 2025-07-29 PROCEDURE — 80053 COMPREHEN METABOLIC PANEL: CPT

## 2025-07-29 PROCEDURE — 82306 VITAMIN D 25 HYDROXY: CPT

## 2025-07-29 PROCEDURE — 3288F FALL RISK ASSESSMENT DOCD: CPT | Mod: CPTII,,, | Performed by: NURSE PRACTITIONER

## 2025-07-29 PROCEDURE — 99214 OFFICE O/P EST MOD 30 MIN: CPT | Mod: PBBFAC | Performed by: NURSE PRACTITIONER

## 2025-07-29 PROCEDURE — 3061F NEG MICROALBUMINURIA REV: CPT | Mod: CPTII,,, | Performed by: NURSE PRACTITIONER

## 2025-07-29 PROCEDURE — 3008F BODY MASS INDEX DOCD: CPT | Mod: CPTII,,, | Performed by: NURSE PRACTITIONER

## 2025-07-29 PROCEDURE — 3078F DIAST BP <80 MM HG: CPT | Mod: CPTII,,, | Performed by: NURSE PRACTITIONER

## 2025-07-29 PROCEDURE — 85025 COMPLETE CBC W/AUTO DIFF WBC: CPT

## 2025-07-29 PROCEDURE — 99214 OFFICE O/P EST MOD 30 MIN: CPT | Mod: S$PBB,,, | Performed by: NURSE PRACTITIONER

## 2025-07-29 RX ORDER — EZETIMIBE 10 MG/1
10 TABLET ORAL
COMMUNITY
Start: 2025-05-27

## 2025-07-29 NOTE — PROGRESS NOTES
Urvashi L Viviana, NP   [facility]  [address] [facility] [address]      PATIENT NAME: Corey Montano  : 1955  DATE: 25  MRN: [id number]        History of Present Illness / Problem Focused Workflow     Corey Montano presents to the clinic with Follow-up and Hypertension     70 yo male for follow up. Last seen 24. PMH back pain, surgery. Diagnosis includes HTN, HLD, IGT, vitamin D, chronic back pain, obesity.  /68. Last blood work 24, A1c 5.9%. Interested in med for weight loss benefits. States had cardiac work up with provider in [place]. Was started on Zetia in addition to statin. Doing well. Denies any other concerns/ complaints.      Other providers   Kael Flannery- Neurosurgery- in [place], [place] ;  Dr. Elías Danielle [facility]mt [facility]   Cardiology, Dr. Perez/ Dr. Garsia at [facility] in [place]     Review of Systems     Review of Systems   Constitutional: Negative.    HENT: Negative.     Eyes: Negative.    Respiratory: Negative.     Cardiovascular: Negative.    Gastrointestinal: Negative.    Endocrine: Negative.    Genitourinary: Negative.    Musculoskeletal: Negative.    Skin: Negative.    Allergic/Immunologic: Negative.    Neurological: Negative.    Hematological: Negative.    Psychiatric/Behavioral: Negative.         Medical / Social / Family History     -------------------------------------    Preop examination    Preop testing        Past Surgical History:   Procedure Laterality Date    ARTHROSCOPY OF KNEE      CERVICAL LAMINECTOMY WITH SPINAL FUSION N/A 2023    Procedure: LAMINECTOMY, SPINE, CERVICAL, WITH FUSION;  Surgeon: Steven Clark MD;  Location: [facility] OR;  Service: Neurosurgery;  Laterality: N/A;  prone  chest rolls  [facility]romon[facility]  [facility]  C3-C6    COLONOSCOPY  2021    FUSION OF LUMBAR SPINE BY ANTERIOR APPROACH N/A 2024    Procedure: FUSION, SPINE, LUMBAR, ANTERIOR  APPROACH;  Surgeon: Kael Flannery MD;  Location: [facility];  Service: Neurosurgery;  Laterality: N/A;  Procedure:L4-5 OLiF, L5-S1 ALiF, Depuy conduit  Length of procedure: 2 hours  Anesthesia:General  Blood:Type and screen  Radiology:C-arm  Brace:LSO  Bed:[place] top  Position:Lateral right brianda  Equipment:Depuy-Mauricio, Othofix-Bone Stim    FUSION OF SPINE WITH INSTRUMENTATION N/A 8/5/2024    Procedure: FUSION, SPINE, WITH INSTRUMENTATION;  Surgeon: Kael Flannery MD;  Location: [facility];  Service: Neurosurgery;  Laterality: N/A;  Procedure:L1-2 laminectomy, L4-S1 posterior instrumentation  Length of procedure: 3 hours  LOS: 3 nights  Anesthesia:General  Blood:Type and screen  Radiology:[facility] James, Ziehm  Microscope:Metrx  Brace:LSO  Bed:[place] [address] Poster  Position:Prone  Equipment:    INSERTION OF PACEMAKER N/A 10/5/2023    Procedure: INSERTION, PACEMAKER;  Surgeon: Moiz Perez MD;  Location: [facility] CATH LAB;  Service: Cardiology;  Laterality: N/A;    LAMINECTOMY, SPINE, MINIMALLY INVASIVE, POSTERIOR APPROACH N/A 8/5/2024    Procedure: LAMINECTOMY, SPINE, MINIMALLY INVASIVE, POSTERIOR APPROACH;  Surgeon: Kael Flannery MD;  Location: [facility];  Service: Neurosurgery;  Laterality: N/A;    TRANSFORAMINAL EPIDURAL INJECTION OF STEROID Left 11/30/2023    Procedure: left  L4/5 + L5/S1 TF DENNYS;  Surgeon: Julio César Mckinley MD;  Location: [facility];  Service: Pain Management;  Laterality: Left;    UMBILICAL HERNIA REPAIR         Social History[1]     Family History   Problem Relation Name Age of Onset    Dementia Mother      Rheum arthritis Mother      Heart disease Father      Hypertension Father          Medications and Allergies     Medications  Current Outpatient Medications   Medication Instructions    acetaminophen (TYLENOL 8 HOUR ORAL) Take by mouth.    atorvastatin (LIPITOR) 10 mg, Oral, Nightly    docusate sodium (COLACE) 100 mg, 2 times daily    ezetimibe (ZETIA) 10 mg    gabapentin (NEURONTIN) 600 mg,  "Oral, 2 times daily    hydroCHLOROthiazide (HYDRODIURIL) 25 mg, Oral    lisinopriL (PRINIVIL,ZESTRIL) 40 mg, Oral, Daily    metoprolol succinate (TOPROL-XL) 50 mg, Oral, Daily    semaglutide (OZEMPIC) 0.25 mg, Subcutaneous, Every 7 days       Allergies  Review of patient's allergies indicates:  No Known Allergies    Physical Examination     Visit Vitals  /68 (BP Location: Right arm, Patient Position: Sitting)   Pulse 63   Temp 98.1 °F (36.7 °C) (Oral)   Resp 16   Ht 6' 2" (1.88 m)   Wt 125.3 kg (276 lb 3.2 oz)   SpO2 99%   BMI 35.46 kg/m²       Physical Exam  Constitutional:       General: He is not in acute distress.     Appearance: Normal appearance. He is obese. He is not ill-appearing, toxic-appearing or diaphoretic.   HENT:      Head: Normocephalic.   Cardiovascular:      Rate and Rhythm: Normal rate and regular rhythm.      Heart sounds: No murmur heard.  Pulmonary:      Effort: Pulmonary effort is normal. No respiratory distress.      Breath sounds: Normal breath sounds. No stridor. No wheezing, rhonchi or rales.   Musculoskeletal:      Comments: AMBULATE WITH WALKER   Skin:     General: Skin is warm and dry.   Neurological:      General: No focal deficit present.      Mental Status: He is alert and oriented to person, place, and time. Mental status is at baseline.      Motor: No weakness.      Coordination: Coordination normal.      Gait: Gait normal.   Psychiatric:         Mood and Affect: Mood normal.         Behavior: Behavior normal.         Thought Content: Thought content normal.         Judgment: Judgment normal.           Results     Lab Results   Component Value Date    WBC 8.59 07/29/2025    RBC 4.94 07/29/2025    HGB 15.4 07/29/2025    HCT 46.0 07/29/2025    MCV 93.1 07/29/2025    MCH 31.2 (H) 07/29/2025    MCHC 33.5 07/29/2025    RDW 13.4 07/29/2025     07/29/2025    MPV 11.9 (H) 07/29/2025    GRAN 7.7 08/06/2024    GRAN 79.0 (H) 08/06/2024    LYMPH 1.2 08/06/2024    LYMPH 11.9 (L) " 08/06/2024    MONO 0.9 08/06/2024    MONO 8.8 08/06/2024    EOS 0.0 08/06/2024    BASO 0.01 08/06/2024    EOSINOPHIL 0.0 08/06/2024    BASOPHIL 0.1 08/06/2024     Sodium   Date Value Ref Range Status   07/29/2025 136 136 - 145 mmol/L Final   08/06/2024 135 (L) 136 - 145 mmol/L Final     Potassium   Date Value Ref Range Status   07/29/2025 4.4 3.5 - 5.1 mmol/L Final   08/06/2024 4.2 3.5 - 5.1 mmol/L Final     Chloride   Date Value Ref Range Status   07/29/2025 104 98 - 107 mmol/L Final   08/06/2024 105 95 - 110 mmol/L Final     CO2   Date Value Ref Range Status   07/29/2025 24 23 - 31 mmol/L Final   08/06/2024 22 (L) 23 - 29 mmol/L Final     Glucose   Date Value Ref Range Status   07/29/2025 98 82 - 115 mg/dL Final   08/06/2024 141 (H) 70 - 110 mg/dL Final     BUN   Date Value Ref Range Status   08/06/2024 20 8 - 23 mg/dL Final     Blood Urea Nitrogen   Date Value Ref Range Status   07/29/2025 17.4 8.4 - 25.7 mg/dL Final     Creatinine   Date Value Ref Range Status   07/29/2025 0.75 0.72 - 1.25 mg/dL Final   08/06/2024 0.8 0.5 - 1.4 mg/dL Final     Calcium   Date Value Ref Range Status   07/29/2025 9.2 8.8 - 10.0 mg/dL Final   08/06/2024 8.7 8.7 - 10.5 mg/dL Final     Protein Total   Date Value Ref Range Status   07/29/2025 7.2 5.8 - 7.6 gm/dL Final     Albumin   Date Value Ref Range Status   07/29/2025 3.7 3.4 - 4.8 g/dL Final     Bilirubin Total   Date Value Ref Range Status   07/29/2025 0.5 <=1.5 mg/dL Final     ALP   Date Value Ref Range Status   07/29/2025 94 40 - 150 unit/L Final     AST   Date Value Ref Range Status   07/29/2025 26 11 - 45 unit/L Final     ALT   Date Value Ref Range Status   07/29/2025 37 0 - 55 unit/L Final     Anion Gap   Date Value Ref Range Status   08/06/2024 8 8 - 16 mmol/L Final     Estimated GFR-Non    Date Value Ref Range Status   05/31/2022 >60 mls/min/1.73/m2 Final     Lab Results   Component Value Date    CHOL 195 06/19/2024     Lab Results   Component Value Date     "HDL 36 06/19/2024     Lab Results   Component Value Date    TRIG 160 (H) 06/19/2024     No components found for: "LDL"  Lab Results   Component Value Date    TSH 2.106 12/13/2024     Lab Results   Component Value Date    PHUR 6.0 07/29/2025    PROTEINUA Negative 07/29/2025    GLUCUA Normal 07/29/2025    OCCULTUA Negative 07/29/2025    NITRITE Negative 07/29/2025    LEUKOCYTESUR Negative 07/29/2025     Lab Results   Component Value Date    HGBA1C 6.0 07/29/2025    HGBA1C 5.9 12/13/2024    HGBA1C 5.9 06/19/2024     Lab Results   Component Value Date    MICALBCREAT  07/29/2025      Comment:      Unable to calculate        Assessment       ICD-10-CM ICD-9-CM   1. Prediabetes  R73.03 790.29   2. Prostate cancer screening  Z12.5 V76.44   3. Primary hypertension  I10 401.9   4. Mixed hyperlipidemia  E78.2 272.2   5. Obesity (BMI 35.0-39.9 without comorbidity)  E66.9 278.00   6. Vitamin D deficiency  E55.9 268.9   7. Wellness examination  Z00.00 V70.0       Plan       Problem List Items Addressed This Visit          Cardiac/Vascular    Hyperlipidemia    Current Assessment & Plan   Lab Results   Component Value Date    CHOL 195 06/19/2024     Lab Results   Component Value Date    HDL 36 06/19/2024     Lab Results   Component Value Date    TRIG 160 (H) 06/19/2024     No components found for: "LDL"  Avoid tobacco/ alcohol  Follow low fat/low cholesterol diet such as avoid/ decrease roberts, sausage, fried foods, cookies, cakes, chips, cheese, whole milk, butter, mayonnaise. Add olive oil, avocados, lean meats, fresh fruits/ vegetables, heart healthy nuts to diet.  Educated on health benefits of exercise 5 days/ week of at least 30 minutes moderate intensity exercise (brisk walking) and 2 days/ week of muscle strength activities  Daily ASA 81 mg for CV prevention  Continue current medication regimen           Relevant Orders    CBC Auto Differential (Completed)    Comprehensive Metabolic Panel (Completed)    Hemoglobin A1C " (Completed)    Vitamin D (Completed)    Microalbumin/Creatinine Ratio, Urine (Completed)    Urinalysis, Reflex to Urine Culture Urine, Clean Catch (Completed)    CBC Auto Differential    Comprehensive Metabolic Panel    Hemoglobin A1C    Lipid Panel    TSH    Microalbumin/Creatinine Ratio, Urine    Urinalysis, Reflex to Urine Culture Urine, Clean Catch    Hypertension    Current Assessment & Plan   BP Readings from Last 3 Encounters:   07/29/25 127/68   02/26/25 (!) 155/84   12/19/24 (!) 140/78     Follow low sodium diet, < 2 gm/day (avoid high salty foods such as processed meats/ sausage/roberts/ sandwich meat, chips, pickles, cheese, crackers and soft drinks/ electrolyte replacement drinks).  Avoid tobacco/ alcohol use  Educated on health benefits of at least 5 days/ week of 30 minutes moderate intensity exercise (brisk walking) and 2 or more days/ week of muscle strength activities  Daily ASA 81 mg for CV prevention  Continue current medication regimen           Relevant Orders    CBC Auto Differential (Completed)    Comprehensive Metabolic Panel (Completed)    Hemoglobin A1C (Completed)    Vitamin D (Completed)    Microalbumin/Creatinine Ratio, Urine (Completed)    Urinalysis, Reflex to Urine Culture Urine, Clean Catch (Completed)    CBC Auto Differential    Comprehensive Metabolic Panel    Hemoglobin A1C    Lipid Panel    TSH    Microalbumin/Creatinine Ratio, Urine    Urinalysis, Reflex to Urine Culture Urine, Clean Catch       Endocrine    Obesity (BMI 35.0-39.9 without comorbidity)    Current Assessment & Plan   BMI 35.46  Admits he enjoys to eat. Aware he needs to portion control.   Will try coverage for semaglutide as pt interested in assistance with wt loss    Educated on increased risk of disease s/t obesity.  Educated on health benefits of at least 5 days/ week of 30 minutes moderate intensity exercise (brisk walking) and 2 or more days/ week of muscle strength activities (as tolerated).  Eat well balanced  diet of fresh fruits/ vegetables, whole grains, lean meats and limit high carbohydrate foods.            Relevant Medications    semaglutide (OZEMPIC) 0.25 mg or 0.5 mg (2 mg/3 mL) pen injector    Other Relevant Orders    CBC Auto Differential (Completed)    Comprehensive Metabolic Panel (Completed)    Hemoglobin A1C (Completed)    Vitamin D (Completed)    Microalbumin/Creatinine Ratio, Urine (Completed)    CBC Auto Differential    Comprehensive Metabolic Panel    Hemoglobin A1C    Lipid Panel    TSH    Microalbumin/Creatinine Ratio, Urine    Urinalysis, Reflex to Urine Culture Urine, Clean Catch    Prediabetes - Primary    Current Assessment & Plan   Lab Results   Component Value Date    HGBA1C 6.0 07/29/2025     ADA diet, regular exercise as tolerated         Relevant Medications    semaglutide (OZEMPIC) 0.25 mg or 0.5 mg (2 mg/3 mL) pen injector    Other Relevant Orders    CBC Auto Differential (Completed)    Comprehensive Metabolic Panel (Completed)    Hemoglobin A1C (Completed)    Vitamin D (Completed)    Microalbumin/Creatinine Ratio, Urine (Completed)    Urinalysis, Reflex to Urine Culture Urine, Clean Catch (Completed)    CBC Auto Differential    Comprehensive Metabolic Panel    Hemoglobin A1C    Lipid Panel    TSH    Microalbumin/Creatinine Ratio, Urine    Urinalysis, Reflex to Urine Culture Urine, Clean Catch    Vitamin D deficiency    Current Assessment & Plan   Lab Results   Component Value Date    [id number] 30 07/29/2025   Level today  Educated on increasing foods high in Vitamin D such as mushrooms, fish oil, cod liver, salmon, tuna, egg yolks, milk fortified with Vitamin D and foods fortified with Vitamin D such as cereals and oatmeal.  Daily supplementation of Vitamin D 2000 IU daily in addition to Calcium supplementation 1000 mg- 1200 mg daily.           Relevant Orders    CBC Auto Differential (Completed)    Comprehensive Metabolic Panel (Completed)    Hemoglobin A1C (Completed)    Vitamin D  (Completed)     Other Visit Diagnoses         Prostate cancer screening      Due for screening     Relevant Orders    PSA, Screening (Completed)      Wellness examination      Due for 6 mo with labs     Relevant Orders    CBC Auto Differential    Comprehensive Metabolic Panel    Hemoglobin A1C    Lipid Panel    TSH    Microalbumin/Creatinine Ratio, Urine    Urinalysis, Reflex to Urine Culture Urine, Clean Catch            Future Appointments   Date Time Provider Department Center   8/27/2025  8:45 AM [facility] ORTHO CLINIC LIMIT [facility] ORTHXR [facility]   8/27/2025  9:30 AM Kael Flannery MD [facility] NEUROSU [facility]   2/2/2026 12:40 PM Urvashi Michelle NP [facility]        Follow up in about 6 months (around 1/29/2026) for HTN, HLD, prediabetes, obesity with fasting labs before visit .    Signature:  Urvashi Michelle NP  [facility]  [address] [address]    Date of encounter: 7/29/25         [1]   Social History  Socioeconomic History    Marital status:      Spouse name: Barbara    Number of children: 2   Tobacco Use    Smoking status: Never     Passive exposure: Never    Smokeless tobacco: Never   Substance and Sexual Activity    Alcohol use: Not Currently     Alcohol/week: 1.0 standard drink of alcohol     Types: 1 Cans of beer per week     Comment: occasional    Drug use: Not Currently     Types: Marijuana     Comment: Gummies    Sexual activity: Not Currently     Partners: Female     Social Drivers of Health     Financial Resource Strain: Low Risk  (8/6/2024)    Overall Financial Resource Strain (CARDIA)     Difficulty of Paying Living Expenses: Not hard at all   Recent Concern: Financial Resource Strain - Medium Risk (6/18/2024)    Overall Financial Resource Strain (CARDIA)     Difficulty of Paying Living Expenses: Somewhat hard   Food Insecurity: No Food Insecurity (8/6/2024)    Hunger Vital Sign     Worried  About Running Out of Food in the Last Year: Never true     Ran Out of Food in the Last Year: Never true   Recent Concern: Food Insecurity - Food Insecurity Present (6/18/2024)    Hunger Vital Sign     Worried About Running Out of Food in the Last Year: Sometimes true     Ran Out of Food in the Last Year: Never true   Transportation Needs: No Transportation Needs (8/6/2024)    TRANSPORTATION NEEDS     Transportation : No   Physical Activity: Sufficiently Active (8/6/2024)    Exercise Vital Sign     Days of Exercise per Week: 5 days     Minutes of Exercise per Session: 60 min   Recent Concern: Physical Activity - Insufficiently Active (6/18/2024)    Exercise Vital Sign     Days of Exercise per Week: 3 days     Minutes of Exercise per Session: 30 min   Stress: No Stress Concern Present (8/6/2024)    [employer] [place] [employer] - Occupational Stress Questionnaire     Feeling of Stress : Not at all   Recent Concern: Stress - Stress Concern Present (6/18/2024)    [employer] [place] [employer] - Occupational Stress Questionnaire     Feeling of Stress : To some extent   Housing Stability: High Risk (6/4/2025)    Received from [facility] SDOH Screening     What is your living situation today?: I have a place to live today, but i am worried about losing it in the future

## 2025-07-29 NOTE — ASSESSMENT & PLAN NOTE
Lab Results   Component Value Date    HGBA1C 6.0 07/29/2025     ADA diet, regular exercise as tolerated

## 2025-07-29 NOTE — ASSESSMENT & PLAN NOTE
BMI 35.46  Admits he enjoys to eat. Aware he needs to portion control.   Will try coverage for semaglutide as pt interested in assistance with wt loss    Educated on increased risk of disease s/t obesity.  Educated on health benefits of at least 5 days/ week of 30 minutes moderate intensity exercise (brisk walking) and 2 or more days/ week of muscle strength activities (as tolerated).  Eat well balanced diet of fresh fruits/ vegetables, whole grains, lean meats and limit high carbohydrate foods.

## 2025-07-29 NOTE — ASSESSMENT & PLAN NOTE
BP Readings from Last 3 Encounters:   07/29/25 127/68   02/26/25 (!) 155/84   12/19/24 (!) 140/78     Follow low sodium diet, < 2 gm/day (avoid high salty foods such as processed meats/ sausage/roberts/ sandwich meat, chips, pickles, cheese, crackers and soft drinks/ electrolyte replacement drinks).  Avoid tobacco/ alcohol use  Educated on health benefits of at least 5 days/ week of 30 minutes moderate intensity exercise (brisk walking) and 2 or more days/ week of muscle strength activities  Daily ASA 81 mg for CV prevention  Continue current medication regimen

## 2025-07-29 NOTE — ASSESSMENT & PLAN NOTE
Lab Results   Component Value Date    [id number] 30 07/29/2025   Level today  Educated on increasing foods high in Vitamin D such as mushrooms, fish oil, cod liver, salmon, tuna, egg yolks, milk fortified with Vitamin D and foods fortified with Vitamin D such as cereals and oatmeal.  Daily supplementation of Vitamin D 2000 IU daily in addition to Calcium supplementation 1000 mg- 1200 mg daily.

## 2025-07-29 NOTE — PROGRESS NOTES
Please inform patient lab results reviewed and overall are within acceptable ranges.   Prediabetes level worsened slightly to 6% (Prediabetes= 5.7%-6.4%; Diabetic = 6.5%). Encourage to limit carbohydrates and engage in regular exercise as tolerated.

## 2025-07-29 NOTE — ASSESSMENT & PLAN NOTE
"Lab Results   Component Value Date    CHOL 195 06/19/2024     Lab Results   Component Value Date    HDL 36 06/19/2024     Lab Results   Component Value Date    TRIG 160 (H) 06/19/2024     No components found for: "LDL"  Avoid tobacco/ alcohol  Follow low fat/low cholesterol diet such as avoid/ decrease roberts, sausage, fried foods, cookies, cakes, chips, cheese, whole milk, butter, mayonnaise. Add olive oil, avocados, lean meats, fresh fruits/ vegetables, heart healthy nuts to diet.  Educated on health benefits of exercise 5 days/ week of at least 30 minutes moderate intensity exercise (brisk walking) and 2 days/ week of muscle strength activities  Daily ASA 81 mg for CV prevention  Continue current medication regimen    "

## 2025-07-31 ENCOUNTER — TELEPHONE (OUTPATIENT)
Dept: INTERNAL MEDICINE | Facility: CLINIC | Age: 70
End: 2025-07-31
Payer: MEDICARE

## 2025-07-31 DIAGNOSIS — I10 HYPERTENSION, UNSPECIFIED TYPE: ICD-10-CM

## 2025-07-31 RX ORDER — METOPROLOL SUCCINATE 50 MG/1
50 TABLET, EXTENDED RELEASE ORAL DAILY
Qty: 90 TABLET | Refills: 1 | OUTPATIENT
Start: 2025-07-31 | End: 2026-01-27

## 2025-07-31 NOTE — TELEPHONE ENCOUNTER
Pt requesting refill for his metoprolol succinate (TOPROL-XL) 50 MG 24 hr tablet . States he has 1 wk supply left at this time.     LOV : 7/29/25    NOV: 2/2/26

## 2025-07-31 NOTE — TELEPHONE ENCOUNTER
----- Message from Urvashi Michelle NP sent at 7/29/2025  2:48 PM CDT -----  Please inform patient lab results reviewed and overall are within acceptable ranges.   Prediabetes level worsened slightly to 6% (Prediabetes= 5.7%-6.4%; Diabetic = 6.5%). Encourage to limit carbohydrates and engage in regular exercise as tolerated.   ----- Message -----  From: Lab, Background User  Sent: 7/29/2025   9:05 AM CDT  To: Urvashi Michelle NP

## 2025-07-31 NOTE — TELEPHONE ENCOUNTER
I informed him during visit yesterday to contact his cardiologist for refills as I do not have cardio notes and pt was unsure of medications/ dosages.

## 2025-07-31 NOTE — TELEPHONE ENCOUNTER
Spoke to pt . Informed him of PCP 's message below . Pt denies having any questions at this time and voiced understanding.

## 2025-08-05 ENCOUNTER — TELEPHONE (OUTPATIENT)
Dept: INTERNAL MEDICINE | Facility: CLINIC | Age: 70
End: 2025-08-05
Payer: MEDICARE

## 2025-08-05 NOTE — TELEPHONE ENCOUNTER
Spoke to pt . Informed him of PCP's message below. Pt stated he has appt with cardiologist on today . States he will f/u with Cardiology when he goes to appt on today in regards to refill for his Metoprolol. Pt denies having any elevated BP symptoms at this time and states he feels fine.                 Urvashi Michelle, NP  [facility]    7/31/25  2:36 PM  Note  I informed him during visit yesterday to contact his cardiologist for refills as I do not have cardio notes and pt was unsure of medications/ dosages.

## 2025-08-05 NOTE — TELEPHONE ENCOUNTER
Copied from CRM #[id number]. Topic: Medications - Medication Authorization  >> Aug 5, 2025 10:06 AM Sol Jaime wrote:  Who Called: Corey Montano    Refill or New Rx:Refill  RX Name and Strength:metoprolol succinate (TOPROL-XL) 50 MG 24 hr tablet  How is the patient currently taking it? (ex. 1XDay):  Is this a 30 day or 90 day RX:  Local or Mail Order:  List of preferred pharmacies on file (remove unneeded): [facility]/pharmacy #[id number] - [place], [place] - [address]  If different Pharmacy is requested, enter Pharmacy information here including location and phone number:    Ordering Provider:      Preferred Method of Contact: Phone Call  Patient's Preferred Phone Number on File: [phone]   Best Call Back Number, if different:  Additional Information: refill. Pt is out of medication and is in urgent need. Please advise

## 2025-08-13 ENCOUNTER — TELEPHONE (OUTPATIENT)
Dept: INTERNAL MEDICINE | Facility: CLINIC | Age: 70
End: 2025-08-13
Payer: MEDICARE

## 2025-08-27 ENCOUNTER — HOSPITAL ENCOUNTER (OUTPATIENT)
Facility: HOSPITAL | Age: 70
Discharge: HOME | End: 2025-08-27
Attending: PHYSICIAN ASSISTANT
Payer: MEDICARE

## 2025-08-27 ENCOUNTER — OFFICE VISIT (OUTPATIENT)
Dept: NEUROSURGERY | Facility: CLINIC | Age: 70
End: 2025-08-27
Payer: MEDICARE

## 2025-08-27 VITALS
SYSTOLIC BLOOD PRESSURE: 149 MMHG | HEIGHT: 74 IN | DIASTOLIC BLOOD PRESSURE: 71 MMHG | BODY MASS INDEX: 35.45 KG/M2 | HEART RATE: 73 BPM | WEIGHT: 276.25 LBS

## 2025-08-27 DIAGNOSIS — G95.9 MYELOPATHY: Primary | ICD-10-CM

## 2025-08-27 DIAGNOSIS — Z98.1 STATUS POST LUMBAR SPINAL FUSION: ICD-10-CM

## 2025-08-27 DIAGNOSIS — Z98.1 S/P LUMBAR SPINAL FUSION: ICD-10-CM

## 2025-08-27 DIAGNOSIS — Z98.1 STATUS POST CERVICAL SPINAL FUSION: ICD-10-CM

## 2025-08-27 PROCEDURE — 3008F BODY MASS INDEX DOCD: CPT | Mod: CPTII,S$GLB,, | Performed by: NEUROLOGICAL SURGERY

## 2025-08-27 PROCEDURE — 3078F DIAST BP <80 MM HG: CPT | Mod: CPTII,S$GLB,, | Performed by: NEUROLOGICAL SURGERY

## 2025-08-27 PROCEDURE — 1159F MED LIST DOCD IN RCRD: CPT | Mod: CPTII,S$GLB,, | Performed by: NEUROLOGICAL SURGERY

## 2025-08-27 PROCEDURE — 99999 PR PBB SHADOW E&M-EST. PATIENT-LVL III: CPT | Mod: PBBFAC,,, | Performed by: NEUROLOGICAL SURGERY

## 2025-08-27 PROCEDURE — 3066F NEPHROPATHY DOC TX: CPT | Mod: CPTII,S$GLB,, | Performed by: NEUROLOGICAL SURGERY

## 2025-08-27 PROCEDURE — 3288F FALL RISK ASSESSMENT DOCD: CPT | Mod: CPTII,S$GLB,, | Performed by: NEUROLOGICAL SURGERY

## 2025-08-27 PROCEDURE — 3044F HG A1C LEVEL LT 7.0%: CPT | Mod: CPTII,S$GLB,, | Performed by: NEUROLOGICAL SURGERY

## 2025-08-27 PROCEDURE — 1101F PT FALLS ASSESS-DOCD LE1/YR: CPT | Mod: CPTII,S$GLB,, | Performed by: NEUROLOGICAL SURGERY

## 2025-08-27 PROCEDURE — 99213 OFFICE O/P EST LOW 20 MIN: CPT | Mod: S$GLB,,, | Performed by: NEUROLOGICAL SURGERY

## 2025-08-27 PROCEDURE — 72082 X-RAY EXAM ENTIRE SPI 2/3 VW: CPT | Mod: TC,PN

## 2025-08-27 PROCEDURE — 1126F AMNT PAIN NOTED NONE PRSNT: CPT | Mod: CPTII,S$GLB,, | Performed by: NEUROLOGICAL SURGERY

## 2025-08-27 PROCEDURE — 3061F NEG MICROALBUMINURIA REV: CPT | Mod: CPTII,S$GLB,, | Performed by: NEUROLOGICAL SURGERY

## 2025-08-27 PROCEDURE — 72082 X-RAY EXAM ENTIRE SPI 2/3 VW: CPT | Mod: 26,,, | Performed by: RADIOLOGY

## 2025-08-27 PROCEDURE — 3077F SYST BP >= 140 MM HG: CPT | Mod: CPTII,S$GLB,, | Performed by: NEUROLOGICAL SURGERY

## 2025-08-29 ENCOUNTER — TELEPHONE (OUTPATIENT)
Dept: INTERNAL MEDICINE | Facility: CLINIC | Age: 70
End: 2025-08-29
Payer: MEDICARE

## (undated) DEVICE — TAPE CURAD SILK ADH 3INX10YD

## (undated) DEVICE — SPHERE MARKER REFLECTIVE DISP

## (undated) DEVICE — SUT VICRYL 2 0 CT 2

## (undated) DEVICE — SEE MEDLINE ITEM 156905

## (undated) DEVICE — PENCIL ROCKER SWITCH 10FT CORD

## (undated) DEVICE — GLOVE BIOGEL PI MICRO INDIC 8

## (undated) DEVICE — SEALER BIPOLAR TISSUE 6.0

## (undated) DEVICE — TAPE SILK 3IN

## (undated) DEVICE — DRAPE LAP T SHT W/ INSTR PAD

## (undated) DEVICE — DRAPE STERILE Z BACK TABLE

## (undated) DEVICE — COVER SNAP KAP 30

## (undated) DEVICE — DRAPE C-ARM/MOBILE XRAY 44X80

## (undated) DEVICE — KIT SURGIFLO HEMOSTATIC MATRIX

## (undated) DEVICE — DRAPE THREE-QTR REINF 53X77IN

## (undated) DEVICE — GLOVE BIOGEL SKINSENSE PI 7.5

## (undated) DEVICE — CORD BIPOLAR 12 FOOT

## (undated) DEVICE — ADHESIVE DERMABOND ADVANCED

## (undated) DEVICE — APPLICATOR CHLORAPREP ORN 26ML

## (undated) DEVICE — COVER LIGHT HANDLE CHROMOPHARE

## (undated) DEVICE — DRESSING AQUACEL FOAM 4X4IN

## (undated) DEVICE — DRESSING TELFA N ADH 3X8

## (undated) DEVICE — GLOVE BIOGEL ECLIPSE SZ 7

## (undated) DEVICE — TOWEL OR NONABSORB ADH 17X26

## (undated) DEVICE — SUT MCRYL PLUS 4-0 PS2 27IN

## (undated) DEVICE — HARNESS CERVICAL VIS 18.5X44IN

## (undated) DEVICE — ELECTRODE REM PLYHSV RETURN 9

## (undated) DEVICE — PINS SKULL ADULT MAYFIELD
Type: IMPLANTABLE DEVICE | Site: NECK | Status: NON-FUNCTIONAL
Removed: 2023-07-31

## (undated) DEVICE — BLADE ELECTRO EDGE INSULATED

## (undated) DEVICE — SUT VICRYL PLUS 0 CT1 18IN

## (undated) DEVICE — PAD DEFIB CADENCE ADULT R2

## (undated) DEVICE — KIT SPINAL PATIENT CARE JACK

## (undated) DEVICE — TAP SHORT SURGICAL NS 3.5MM

## (undated) DEVICE — SUT CTD VICRYL 3-0 CR/SH

## (undated) DEVICE — STAPLER SKIN ROTATING HEAD

## (undated) DEVICE — SKINMARKER W/RULER DEVON

## (undated) DEVICE — SUT SILK 0 SH 30IN BLK BR

## (undated) DEVICE — DRAPE INCISE IOBAN 2 23X23IN

## (undated) DEVICE — TRAY FOLEY 16FR INFECTION CONT

## (undated) DEVICE — SUT STRATAFIX PDS PLS 45CM

## (undated) DEVICE — SUPPORT ULNA NERVE PROTECTOR

## (undated) DEVICE — COVER OVERHEAD SURG LT BLUE

## (undated) DEVICE — PACK ECLIPSE UNIVERSAL STERILE

## (undated) DEVICE — TRAP MUCUS SPECIMEN 40CC

## (undated) DEVICE — GOWN POLY REINF BRTH SLV LG

## (undated) DEVICE — DRESSING TRANS 4X4 TEGADERM

## (undated) DEVICE — GUIDEWIRE SPINAL BLUNT 1.45MM
Type: IMPLANTABLE DEVICE | Site: SPINE LUMBAR | Status: NON-FUNCTIONAL
Removed: 2024-08-05

## (undated) DEVICE — SET MALIS GNRTR IRR BPLR CORD

## (undated) DEVICE — ADHESIVE MASTISOL VIAL 48/BX

## (undated) DEVICE — CANNULA NASAL ADULT

## (undated) DEVICE — NDL SPINAL SPINOCAN 22GX3.5

## (undated) DEVICE — TIP YANKAUERS BULB NO VENT

## (undated) DEVICE — COUNT NDL FOAM MAGNET 40COUNT

## (undated) DEVICE — SPONGE GAUZE 16PLY 4X4

## (undated) DEVICE — Device

## (undated) DEVICE — HEMOSTAT SURGICEL FIBRLR 2X4IN

## (undated) DEVICE — GAUZE SPONGE 4X4 12PLY

## (undated) DEVICE — DRESSING AQUACEL FOAM 5 X 5

## (undated) DEVICE — NDL 22GA X1 1/2 REG BEVEL

## (undated) DEVICE — NDL HYPO REG 25G X 1 1/2

## (undated) DEVICE — SLEEVE SCD EXPRESS CALF MEDIUM

## (undated) DEVICE — DRAPE STERI INSTRUMENT 1018

## (undated) DEVICE — BURR MIS CURVED 3.0MM

## (undated) DEVICE — CLIPPER BLADE MOD 4406 (CAREF)

## (undated) DEVICE — SPONGE LAP 18X18 PREWASHED

## (undated) DEVICE — BUR SURGICAL 13CM

## (undated) DEVICE — SUT CTD VICRYL PLUS 4/0

## (undated) DEVICE — GOWN POLY REINF BRTH SLV XL

## (undated) DEVICE — COVER PROXIMA MAYO STAND

## (undated) DEVICE — TUBING SUC UNIV W/CONN 12FT

## (undated) DEVICE — TUBE FRAZIER 5MM 2FT SOFT TIP

## (undated) DEVICE — DRAPE TOP 53X102IN

## (undated) DEVICE — SPONGE COTTON TRAY 4X4IN

## (undated) DEVICE — SUT 2/0 30IN SILK BLK BRAI

## (undated) DEVICE — DRESSING ABSRBNT ISLAND 3.6X8

## (undated) DEVICE — EVACUATOR WOUND BULB 100CC

## (undated) DEVICE — ELECTRODE BLADE INSULATED 1 IN

## (undated) DEVICE — DRAIN BLAKE HUBLS 10F RD

## (undated) DEVICE — KIT POWDER ABSORBABLE GELATIN

## (undated) DEVICE — DRESSING AQUACEL SACRAL 8 X 7

## (undated) DEVICE — DRAPE STERI-DRAPE 1000 17X11IN

## (undated) DEVICE — SUT 2-0 ETHILON 18 FS

## (undated) DEVICE — COVER BACK TBL HD 2-TIER 72IN

## (undated) DEVICE — DRESSING TEGADERM 2 3/8 X 2.75

## (undated) DEVICE — GLOVE BIOGEL PI MICRO SZ 7.5

## (undated) DEVICE — GELATIN SURGIPOWDER ABSORBABLE

## (undated) DEVICE — DRESSING SURGICAL 1/2X1/2

## (undated) DEVICE — ALCOHOL 70% ISOP W/GREEN 16OZ